# Patient Record
Sex: FEMALE | Race: OTHER | HISPANIC OR LATINO | URBAN - METROPOLITAN AREA
[De-identification: names, ages, dates, MRNs, and addresses within clinical notes are randomized per-mention and may not be internally consistent; named-entity substitution may affect disease eponyms.]

---

## 2017-04-28 ENCOUNTER — EMERGENCY (EMERGENCY)
Facility: HOSPITAL | Age: 52
LOS: 1 days | Discharge: HOME | End: 2017-04-28
Attending: EMERGENCY MEDICINE | Admitting: EMERGENCY MEDICINE
Payer: MEDICARE

## 2017-04-28 VITALS
TEMPERATURE: 98 F | SYSTOLIC BLOOD PRESSURE: 134 MMHG | RESPIRATION RATE: 22 BRPM | HEART RATE: 72 BPM | DIASTOLIC BLOOD PRESSURE: 85 MMHG

## 2017-04-28 LAB
ALBUMIN SERPL ELPH-MCNC: 5.3 G/DL — HIGH (ref 3.3–5)
ALP SERPL-CCNC: 73 U/L — SIGNIFICANT CHANGE UP (ref 40–120)
ALT FLD-CCNC: 17 U/L — SIGNIFICANT CHANGE UP (ref 4–33)
APTT BLD: 29.1 SEC — SIGNIFICANT CHANGE UP (ref 27.5–37.4)
AST SERPL-CCNC: 20 U/L — SIGNIFICANT CHANGE UP (ref 4–32)
BASE EXCESS BLDV CALC-SCNC: 0.1 MMOL/L — SIGNIFICANT CHANGE UP
BASOPHILS # BLD AUTO: 0.04 K/UL — SIGNIFICANT CHANGE UP (ref 0–0.2)
BASOPHILS NFR BLD AUTO: 0.2 % — SIGNIFICANT CHANGE UP (ref 0–2)
BILIRUB SERPL-MCNC: 1.1 MG/DL — SIGNIFICANT CHANGE UP (ref 0.2–1.2)
BLOOD GAS VENOUS - CREATININE: 0.88 MG/DL — SIGNIFICANT CHANGE UP (ref 0.5–1.3)
BUN SERPL-MCNC: 27 MG/DL — HIGH (ref 7–23)
CALCIUM SERPL-MCNC: 10.6 MG/DL — HIGH (ref 8.4–10.5)
CHLORIDE BLDV-SCNC: 104 MMOL/L — SIGNIFICANT CHANGE UP (ref 96–108)
CHLORIDE SERPL-SCNC: 98 MMOL/L — SIGNIFICANT CHANGE UP (ref 98–107)
CO2 SERPL-SCNC: 21 MMOL/L — LOW (ref 22–31)
CREAT SERPL-MCNC: 0.99 MG/DL — SIGNIFICANT CHANGE UP (ref 0.5–1.3)
EOSINOPHIL # BLD AUTO: 0.1 K/UL — SIGNIFICANT CHANGE UP (ref 0–0.5)
EOSINOPHIL NFR BLD AUTO: 0.5 % — SIGNIFICANT CHANGE UP (ref 0–6)
GAS PNL BLDV: 137 MMOL/L — SIGNIFICANT CHANGE UP (ref 136–146)
GLUCOSE BLDV-MCNC: 138 — HIGH (ref 70–99)
GLUCOSE SERPL-MCNC: 135 MG/DL — HIGH (ref 70–99)
HCO3 BLDV-SCNC: 25 MMOL/L — SIGNIFICANT CHANGE UP (ref 20–27)
HCT VFR BLD CALC: 47.7 % — HIGH (ref 34.5–45)
HCT VFR BLDV CALC: 52.2 % — HIGH (ref 34.5–45)
HGB BLD-MCNC: 17 G/DL — HIGH (ref 11.5–15.5)
HGB BLDV-MCNC: 17.1 G/DL — HIGH (ref 11.5–15.5)
IMM GRANULOCYTES NFR BLD AUTO: 0.3 % — SIGNIFICANT CHANGE UP (ref 0–1.5)
INR BLD: 1.15 — SIGNIFICANT CHANGE UP (ref 0.88–1.17)
LACTATE BLDV-MCNC: 2.7 MMOL/L — HIGH (ref 0.5–2)
LYMPHOCYTES # BLD AUTO: 39.1 % — SIGNIFICANT CHANGE UP (ref 13–44)
LYMPHOCYTES # BLD AUTO: 7.71 K/UL — HIGH (ref 1–3.3)
MCHC RBC-ENTMCNC: 32.2 PG — SIGNIFICANT CHANGE UP (ref 27–34)
MCHC RBC-ENTMCNC: 35.6 % — SIGNIFICANT CHANGE UP (ref 32–36)
MCV RBC AUTO: 90.3 FL — SIGNIFICANT CHANGE UP (ref 80–100)
MONOCYTES # BLD AUTO: 1.15 K/UL — HIGH (ref 0–0.9)
MONOCYTES NFR BLD AUTO: 5.8 % — SIGNIFICANT CHANGE UP (ref 2–14)
NEUTROPHILS # BLD AUTO: 10.67 K/UL — HIGH (ref 1.8–7.4)
NEUTROPHILS NFR BLD AUTO: 54.1 % — SIGNIFICANT CHANGE UP (ref 43–77)
PCO2 BLDV: 30 MMHG — LOW (ref 41–51)
PH BLDV: 7.5 PH — HIGH (ref 7.32–7.43)
PLATELET # BLD AUTO: 286 K/UL — SIGNIFICANT CHANGE UP (ref 150–400)
PMV BLD: 11.6 FL — SIGNIFICANT CHANGE UP (ref 7–13)
PO2 BLDV: 51 MMHG — HIGH (ref 35–40)
POTASSIUM BLDV-SCNC: 3.3 MMOL/L — LOW (ref 3.4–4.5)
POTASSIUM SERPL-MCNC: 3.5 MMOL/L — SIGNIFICANT CHANGE UP (ref 3.5–5.3)
POTASSIUM SERPL-SCNC: 3.5 MMOL/L — SIGNIFICANT CHANGE UP (ref 3.5–5.3)
PROT SERPL-MCNC: 9.1 G/DL — HIGH (ref 6–8.3)
PROTHROM AB SERPL-ACNC: 12.9 SEC — SIGNIFICANT CHANGE UP (ref 9.8–13.1)
RBC # BLD: 5.28 M/UL — HIGH (ref 3.8–5.2)
RBC # FLD: 12.4 % — SIGNIFICANT CHANGE UP (ref 10.3–14.5)
SAO2 % BLDV: 88.8 % — HIGH (ref 60–85)
SODIUM SERPL-SCNC: 140 MMOL/L — SIGNIFICANT CHANGE UP (ref 135–145)
WBC # BLD: 19.73 K/UL — HIGH (ref 3.8–10.5)
WBC # FLD AUTO: 19.73 K/UL — HIGH (ref 3.8–10.5)

## 2017-04-28 PROCEDURE — 93010 ELECTROCARDIOGRAM REPORT: CPT

## 2017-04-28 PROCEDURE — 71020: CPT | Mod: 26

## 2017-04-28 PROCEDURE — 99285 EMERGENCY DEPT VISIT HI MDM: CPT | Mod: 25,GC

## 2017-04-28 RX ORDER — IPRATROPIUM/ALBUTEROL SULFATE 18-103MCG
3 AEROSOL WITH ADAPTER (GRAM) INHALATION ONCE
Qty: 0 | Refills: 0 | Status: COMPLETED | OUTPATIENT
Start: 2017-04-28 | End: 2017-04-28

## 2017-04-28 RX ADMIN — Medication 3 MILLILITER(S): at 22:46

## 2017-04-28 RX ADMIN — Medication 50 MILLIGRAM(S): at 22:46

## 2017-04-28 NOTE — ED PROVIDER NOTE - OBJECTIVE STATEMENT
50yo f pmh COPD, HTN, pw cough and SOB. Pt with congestion + productive cough x 4 days. Tactile fevers. Yellow to green sputum. Pt takes no meds or inhalers for COPD. Pt has a pulmonologist but does not recall the name. No chest pain, abdominal pain. Pt with chronic back pain. No recent travel/surgery/immobilization/estrogen use/hx of clots.

## 2017-04-28 NOTE — ED ADULT NURSE NOTE - OBJECTIVE STATEMENT
Jaja RN received pt to spot 18 A&Ox4 reports multiple medical complaints including productive cough with yellow sputum x 3 days, difficulty breathing, N/V, swollen lymph nodes, and "palpitations". Pt answering questions in full sentences, NAD noted, reports hx of heart murmur. IV placed, labs sent, VS as noted, MD at bedside, will reassess.

## 2017-04-28 NOTE — ED PROVIDER NOTE - PROGRESS NOTE DETAILS
pt complained to tech of chest pain while getting EKG, on reeval pt says chest pain for 4 days, pt also endorses stopping opioid pain meds for past few days and experiencing withdrawal symptoms, + diarrhea/ + vomiting. + CP x 4 days. Tani RAMÍREZ: I received sign out from this patient.  EKG done and reviewed with no acute ischemic changes.  Pt initially endorsing to providers only SOB, but now c/o chest pressure to ED tech.  Upon re-evaluation pt now reports that she has been having chest pressure along with the SOB, beginning with exertion, but unrelieved at rest.  The pt also reports abd discomfort, diarrhea, poor appetite, chills, congestion, post-nasal drip, nausea, cough x 4 days.  She reports that she has been on oxycodone for "years" for her chronic back pain, but 4-5 days ago had stopped taking them as she is trying to wean herself off.  No other reported drug or etoh use.  Sxs likely reflective of withdrawal.  VSS, no hypoxia or increased work of breathing.  Mildly poor air entry throughout but no wheezing and pt reports she is feeling better after nebulization.  Will po chall, give zofran and IVFs for symptomatic rx.  Will add cardiac enzymes, given constant chest pain but low likelihood for acs.

## 2017-04-28 NOTE — ED PROVIDER NOTE - ATTENDING CONTRIBUTION TO CARE
[place]  pt with h/o RAD  4 days of cough productive of green/brown phlegm  nasal congestion  SOB  no fever or rigors  SOB 1-2 blocks (baseline walks3-4)  exam  decreased air movement b/l no clear wheeze heard  card S1S2  no gallop abd benign  no LE edema  Imp  story suggestive of bronchitis  will treat with nebulizer  steroid  check CXR  reassess

## 2017-04-28 NOTE — ED PROVIDER NOTE - MEDICAL DECISION MAKING DETAILS
pt with likely copd exac vs bronchitis, will give nebs, steroids, abx, cxr, labs, well appearing, likely d/c pending cxr/peak flows.

## 2017-04-28 NOTE — ED ADULT TRIAGE NOTE - CHIEF COMPLAINT QUOTE
DIFF BREATHING    pt diff breathing 3 days, expectorating green/black phlegm, palish/grey pallor, diff breathing, heart racing, diff swallowing, diff finishing sentences....hx of copd, sleep apnea,

## 2017-04-29 VITALS
OXYGEN SATURATION: 98 % | RESPIRATION RATE: 17 BRPM | TEMPERATURE: 99 F | SYSTOLIC BLOOD PRESSURE: 127 MMHG | HEART RATE: 66 BPM | DIASTOLIC BLOOD PRESSURE: 60 MMHG

## 2017-04-29 LAB
BASE EXCESS BLDV CALC-SCNC: -0.8 MMOL/L — SIGNIFICANT CHANGE UP
BASOPHILS NFR SPEC: 0 % — SIGNIFICANT CHANGE UP (ref 0–2)
BLOOD GAS VENOUS - CREATININE: 0.79 MG/DL — SIGNIFICANT CHANGE UP (ref 0.5–1.3)
CHLORIDE BLDV-SCNC: 107 MMOL/L — SIGNIFICANT CHANGE UP (ref 96–108)
CK MB BLD-MCNC: 1 NG/ML — SIGNIFICANT CHANGE UP (ref 1–4.7)
CK MB BLD-MCNC: SIGNIFICANT CHANGE UP (ref 0–2.5)
CK SERPL-CCNC: 57 U/L — SIGNIFICANT CHANGE UP (ref 25–170)
EOSINOPHIL NFR FLD: 0 % — SIGNIFICANT CHANGE UP (ref 0–6)
GAS PNL BLDV: 138 MMOL/L — SIGNIFICANT CHANGE UP (ref 136–146)
GLUCOSE BLDV-MCNC: 137 — HIGH (ref 70–99)
HCO3 BLDV-SCNC: 24 MMOL/L — SIGNIFICANT CHANGE UP (ref 20–27)
HCT VFR BLDV CALC: 43.9 % — SIGNIFICANT CHANGE UP (ref 34.5–45)
HGB BLDV-MCNC: 14.3 G/DL — SIGNIFICANT CHANGE UP (ref 11.5–15.5)
HYPOCHROMIA BLD QL: SLIGHT — SIGNIFICANT CHANGE UP
LACTATE BLDV-MCNC: 1.3 MMOL/L — SIGNIFICANT CHANGE UP (ref 0.5–2)
LYMPHOCYTES NFR SPEC AUTO: 30.7 % — SIGNIFICANT CHANGE UP (ref 13–44)
MACROCYTES BLD QL: SLIGHT — SIGNIFICANT CHANGE UP
MICROCYTES BLD QL: SLIGHT — SIGNIFICANT CHANGE UP
MONOCYTES NFR BLD: 4.4 % — SIGNIFICANT CHANGE UP (ref 2–9)
NEUTROPHIL AB SER-ACNC: 59.6 % — SIGNIFICANT CHANGE UP (ref 43–77)
NEUTS BAND # BLD: 1.8 % — SIGNIFICANT CHANGE UP (ref 0–6)
PCO2 BLDV: 36 MMHG — LOW (ref 41–51)
PH BLDV: 7.42 PH — SIGNIFICANT CHANGE UP (ref 7.32–7.43)
PLATELET COUNT - ESTIMATE: NORMAL — SIGNIFICANT CHANGE UP
PO2 BLDV: 44 MMHG — HIGH (ref 35–40)
POTASSIUM BLDV-SCNC: 3.3 MMOL/L — LOW (ref 3.4–4.5)
SAO2 % BLDV: 78.8 % — SIGNIFICANT CHANGE UP (ref 60–85)
SPHEROCYTES BLD QL SMEAR: SLIGHT — SIGNIFICANT CHANGE UP
TROPONIN T SERPL-MCNC: < 0.06 NG/ML — SIGNIFICANT CHANGE UP (ref 0–0.06)
VARIANT LYMPHS # BLD: 3.5 % — SIGNIFICANT CHANGE UP

## 2017-04-29 RX ORDER — SODIUM CHLORIDE 9 MG/ML
500 INJECTION INTRAMUSCULAR; INTRAVENOUS; SUBCUTANEOUS ONCE
Qty: 0 | Refills: 0 | Status: COMPLETED | OUTPATIENT
Start: 2017-04-29 | End: 2017-04-29

## 2017-04-29 RX ORDER — SODIUM CHLORIDE 9 MG/ML
1000 INJECTION INTRAMUSCULAR; INTRAVENOUS; SUBCUTANEOUS ONCE
Qty: 0 | Refills: 0 | Status: COMPLETED | OUTPATIENT
Start: 2017-04-29 | End: 2017-04-29

## 2017-04-29 RX ORDER — ALBUTEROL 90 UG/1
1 AEROSOL, METERED ORAL ONCE
Qty: 0 | Refills: 0 | Status: COMPLETED | OUTPATIENT
Start: 2017-04-29 | End: 2017-04-29

## 2017-04-29 RX ORDER — IBUPROFEN 200 MG
600 TABLET ORAL ONCE
Qty: 0 | Refills: 0 | Status: COMPLETED | OUTPATIENT
Start: 2017-04-29 | End: 2017-04-29

## 2017-04-29 RX ORDER — ONDANSETRON 8 MG/1
1 TABLET, FILM COATED ORAL
Qty: 12 | Refills: 0 | OUTPATIENT
Start: 2017-04-29 | End: 2017-05-03

## 2017-04-29 RX ORDER — CIPROFLOXACIN LACTATE 400MG/40ML
1 VIAL (ML) INTRAVENOUS
Qty: 7 | Refills: 0 | OUTPATIENT
Start: 2017-04-29 | End: 2017-05-06

## 2017-04-29 RX ORDER — ONDANSETRON 8 MG/1
4 TABLET, FILM COATED ORAL ONCE
Qty: 0 | Refills: 0 | Status: COMPLETED | OUTPATIENT
Start: 2017-04-29 | End: 2017-04-29

## 2017-04-29 RX ADMIN — SODIUM CHLORIDE 500 MILLILITER(S): 9 INJECTION INTRAMUSCULAR; INTRAVENOUS; SUBCUTANEOUS at 00:57

## 2017-04-29 RX ADMIN — Medication 600 MILLIGRAM(S): at 02:37

## 2017-04-29 RX ADMIN — ALBUTEROL 1 PUFF(S): 90 AEROSOL, METERED ORAL at 04:02

## 2017-04-29 RX ADMIN — ONDANSETRON 4 MILLIGRAM(S): 8 TABLET, FILM COATED ORAL at 00:57

## 2017-04-29 RX ADMIN — SODIUM CHLORIDE 1000 MILLILITER(S): 9 INJECTION INTRAMUSCULAR; INTRAVENOUS; SUBCUTANEOUS at 02:23

## 2017-04-29 RX ADMIN — Medication 600 MILLIGRAM(S): at 03:07

## 2018-08-05 ENCOUNTER — INPATIENT (INPATIENT)
Facility: HOSPITAL | Age: 53
LOS: 7 days | Discharge: HOME | DRG: 167 | End: 2018-08-13
Attending: GENERAL PRACTICE | Admitting: GENERAL PRACTICE
Payer: COMMERCIAL

## 2018-08-05 VITALS
TEMPERATURE: 98 F | RESPIRATION RATE: 18 BRPM | OXYGEN SATURATION: 98 % | SYSTOLIC BLOOD PRESSURE: 147 MMHG | DIASTOLIC BLOOD PRESSURE: 98 MMHG | HEART RATE: 104 BPM

## 2018-08-05 DIAGNOSIS — J18.9 PNEUMONIA, UNSPECIFIED ORGANISM: ICD-10-CM

## 2018-08-05 LAB
ALBUMIN SERPL ELPH-MCNC: 3.3 G/DL — LOW (ref 3.5–5)
ALP SERPL-CCNC: 94 U/L — SIGNIFICANT CHANGE UP (ref 40–120)
ALT FLD-CCNC: 13 U/L DA — SIGNIFICANT CHANGE UP (ref 10–60)
ANION GAP SERPL CALC-SCNC: 9 MMOL/L — SIGNIFICANT CHANGE UP (ref 5–17)
APTT BLD: 31.9 SEC — SIGNIFICANT CHANGE UP (ref 27.5–37.4)
AST SERPL-CCNC: 12 U/L — SIGNIFICANT CHANGE UP (ref 10–40)
BASOPHILS # BLD AUTO: 0.1 K/UL — SIGNIFICANT CHANGE UP (ref 0–0.2)
BASOPHILS NFR BLD AUTO: 0.5 % — SIGNIFICANT CHANGE UP (ref 0–2)
BILIRUB SERPL-MCNC: 0.4 MG/DL — SIGNIFICANT CHANGE UP (ref 0.2–1.2)
BUN SERPL-MCNC: 17 MG/DL — SIGNIFICANT CHANGE UP (ref 7–18)
CALCIUM SERPL-MCNC: 9.6 MG/DL — SIGNIFICANT CHANGE UP (ref 8.4–10.5)
CHLORIDE SERPL-SCNC: 107 MMOL/L — SIGNIFICANT CHANGE UP (ref 96–108)
CK MB BLD-MCNC: <1.6 % — SIGNIFICANT CHANGE UP (ref 0–3.5)
CK MB CFR SERPL CALC: <1 NG/ML — SIGNIFICANT CHANGE UP (ref 0–3.6)
CK SERPL-CCNC: 64 U/L — SIGNIFICANT CHANGE UP (ref 21–215)
CO2 SERPL-SCNC: 25 MMOL/L — SIGNIFICANT CHANGE UP (ref 22–31)
CREAT SERPL-MCNC: 0.97 MG/DL — SIGNIFICANT CHANGE UP (ref 0.5–1.3)
D DIMER BLD IA.RAPID-MCNC: 574 NG/ML DDU — HIGH
EOSINOPHIL # BLD AUTO: 0.1 K/UL — SIGNIFICANT CHANGE UP (ref 0–0.5)
EOSINOPHIL NFR BLD AUTO: 0.4 % — SIGNIFICANT CHANGE UP (ref 0–6)
GLUCOSE SERPL-MCNC: 116 MG/DL — HIGH (ref 70–99)
HCT VFR BLD CALC: 42.3 % — SIGNIFICANT CHANGE UP (ref 34.5–45)
HGB BLD-MCNC: 14 G/DL — SIGNIFICANT CHANGE UP (ref 11.5–15.5)
INR BLD: 1.39 RATIO — HIGH (ref 0.88–1.16)
LACTATE SERPL-SCNC: 1.1 MMOL/L — SIGNIFICANT CHANGE UP (ref 0.7–2)
LYMPHOCYTES # BLD AUTO: 19.7 % — SIGNIFICANT CHANGE UP (ref 13–44)
LYMPHOCYTES # BLD AUTO: 3.3 K/UL — SIGNIFICANT CHANGE UP (ref 1–3.3)
MCHC RBC-ENTMCNC: 30.4 PG — SIGNIFICANT CHANGE UP (ref 27–34)
MCHC RBC-ENTMCNC: 33 GM/DL — SIGNIFICANT CHANGE UP (ref 32–36)
MCV RBC AUTO: 92.2 FL — SIGNIFICANT CHANGE UP (ref 80–100)
MONOCYTES # BLD AUTO: 0.7 K/UL — SIGNIFICANT CHANGE UP (ref 0–0.9)
MONOCYTES NFR BLD AUTO: 4 % — SIGNIFICANT CHANGE UP (ref 2–14)
NEUTROPHILS # BLD AUTO: 12.8 K/UL — HIGH (ref 1.8–7.4)
NEUTROPHILS NFR BLD AUTO: 75.4 % — SIGNIFICANT CHANGE UP (ref 43–77)
NT-PROBNP SERPL-SCNC: 191 PG/ML — HIGH (ref 0–125)
PLATELET # BLD AUTO: 350 K/UL — SIGNIFICANT CHANGE UP (ref 150–400)
POTASSIUM SERPL-MCNC: 3.9 MMOL/L — SIGNIFICANT CHANGE UP (ref 3.5–5.3)
POTASSIUM SERPL-SCNC: 3.9 MMOL/L — SIGNIFICANT CHANGE UP (ref 3.5–5.3)
PROT SERPL-MCNC: 9.3 G/DL — HIGH (ref 6–8.3)
PROTHROM AB SERPL-ACNC: 15.3 SEC — HIGH (ref 9.8–12.7)
RAPID RVP RESULT: SIGNIFICANT CHANGE UP
RBC # BLD: 4.59 M/UL — SIGNIFICANT CHANGE UP (ref 3.8–5.2)
RBC # FLD: 11.4 % — SIGNIFICANT CHANGE UP (ref 10.3–14.5)
SODIUM SERPL-SCNC: 141 MMOL/L — SIGNIFICANT CHANGE UP (ref 135–145)
TROPONIN I SERPL-MCNC: <0.015 NG/ML — SIGNIFICANT CHANGE UP (ref 0–0.04)
WBC # BLD: 17 K/UL — HIGH (ref 3.8–10.5)
WBC # FLD AUTO: 17 K/UL — HIGH (ref 3.8–10.5)

## 2018-08-05 PROCEDURE — 99285 EMERGENCY DEPT VISIT HI MDM: CPT

## 2018-08-05 PROCEDURE — 71045 X-RAY EXAM CHEST 1 VIEW: CPT | Mod: 26

## 2018-08-05 RX ORDER — KETOROLAC TROMETHAMINE 30 MG/ML
30 SYRINGE (ML) INJECTION ONCE
Qty: 0 | Refills: 0 | Status: DISCONTINUED | OUTPATIENT
Start: 2018-08-05 | End: 2018-08-05

## 2018-08-05 RX ORDER — IPRATROPIUM/ALBUTEROL SULFATE 18-103MCG
3 AEROSOL WITH ADAPTER (GRAM) INHALATION
Qty: 0 | Refills: 0 | Status: COMPLETED | OUTPATIENT
Start: 2018-08-05 | End: 2018-08-05

## 2018-08-05 RX ORDER — PIPERACILLIN AND TAZOBACTAM 4; .5 G/20ML; G/20ML
3.38 INJECTION, POWDER, LYOPHILIZED, FOR SOLUTION INTRAVENOUS ONCE
Qty: 0 | Refills: 0 | Status: COMPLETED | OUTPATIENT
Start: 2018-08-05 | End: 2018-08-05

## 2018-08-05 RX ORDER — ACETAMINOPHEN 500 MG
975 TABLET ORAL ONCE
Qty: 0 | Refills: 0 | Status: COMPLETED | OUTPATIENT
Start: 2018-08-05 | End: 2018-08-05

## 2018-08-05 RX ADMIN — Medication 30 MILLIGRAM(S): at 18:33

## 2018-08-05 RX ADMIN — Medication 30 MILLIGRAM(S): at 19:35

## 2018-08-05 RX ADMIN — Medication 975 MILLIGRAM(S): at 20:19

## 2018-08-05 RX ADMIN — Medication 3 MILLILITER(S): at 18:34

## 2018-08-05 RX ADMIN — PIPERACILLIN AND TAZOBACTAM 200 GRAM(S): 4; .5 INJECTION, POWDER, LYOPHILIZED, FOR SOLUTION INTRAVENOUS at 18:33

## 2018-08-05 RX ADMIN — Medication 125 MILLIGRAM(S): at 18:33
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
## 2018-08-05 NOTE — ED ADULT NURSE NOTE - NSIMPLEMENTINTERV_GEN_ALL_ED
Implemented All Universal Safety Interventions:  [place] to call system. Call bell, personal items and telephone within reach. Instruct patient to call for assistance. Room bathroom lighting operational. Non-slip footwear when patient is off stretcher. Physically safe environment: no spills, clutter or unnecessary equipment. Stretcher in lowest position, wheels locked, appropriate side rails in place.

## 2018-08-05 NOTE — ED PROVIDER NOTE - MUSCULOSKELETAL, MLM
Spine appears normal, range of motion is not limited, Rt subaxilla-tenderness to palp., no deformity

## 2018-08-05 NOTE — ED PROVIDER NOTE - CARE PLAN
Principal Discharge DX:	PNA (pneumonia)  Secondary Diagnosis:	Asthma exacerbation  Secondary Diagnosis:	Cavitating mass of lower lobe of left lung

## 2018-08-06 DIAGNOSIS — J18.9 PNEUMONIA, UNSPECIFIED ORGANISM: ICD-10-CM

## 2018-08-06 DIAGNOSIS — D64.9 ANEMIA, UNSPECIFIED: ICD-10-CM

## 2018-08-06 DIAGNOSIS — Z29.9 ENCOUNTER FOR PROPHYLACTIC MEASURES, UNSPECIFIED: ICD-10-CM

## 2018-08-06 DIAGNOSIS — J98.4 OTHER DISORDERS OF LUNG: ICD-10-CM

## 2018-08-06 DIAGNOSIS — I10 ESSENTIAL (PRIMARY) HYPERTENSION: ICD-10-CM

## 2018-08-06 DIAGNOSIS — J44.9 CHRONIC OBSTRUCTIVE PULMONARY DISEASE, UNSPECIFIED: ICD-10-CM

## 2018-08-06 LAB
24R-OH-CALCIDIOL SERPL-MCNC: 27.6 NG/ML — LOW (ref 30–80)
ANION GAP SERPL CALC-SCNC: 11 MMOL/L — SIGNIFICANT CHANGE UP (ref 5–17)
BASOPHILS # BLD AUTO: 0 K/UL — SIGNIFICANT CHANGE UP (ref 0–0.2)
BASOPHILS NFR BLD AUTO: 0.3 % — SIGNIFICANT CHANGE UP (ref 0–2)
BUN SERPL-MCNC: 15 MG/DL — SIGNIFICANT CHANGE UP (ref 7–18)
CALCIUM SERPL-MCNC: 9.1 MG/DL — SIGNIFICANT CHANGE UP (ref 8.4–10.5)
CHLORIDE SERPL-SCNC: 110 MMOL/L — HIGH (ref 96–108)
CHOLEST SERPL-MCNC: 110 MG/DL — SIGNIFICANT CHANGE UP (ref 10–199)
CO2 SERPL-SCNC: 20 MMOL/L — LOW (ref 22–31)
CREAT SERPL-MCNC: 0.9 MG/DL — SIGNIFICANT CHANGE UP (ref 0.5–1.3)
EOSINOPHIL # BLD AUTO: 0 K/UL — SIGNIFICANT CHANGE UP (ref 0–0.5)
EOSINOPHIL NFR BLD AUTO: 0 % — SIGNIFICANT CHANGE UP (ref 0–6)
ERYTHROCYTE [SEDIMENTATION RATE] IN BLOOD: 88 MM/HR — HIGH (ref 0–20)
GLUCOSE SERPL-MCNC: 188 MG/DL — HIGH (ref 70–99)
HBA1C BLD-MCNC: 6.1 % — HIGH (ref 4–5.6)
HCT VFR BLD CALC: 37.7 % — SIGNIFICANT CHANGE UP (ref 34.5–45)
HDLC SERPL-MCNC: 29 MG/DL — LOW (ref 40–125)
HGB BLD-MCNC: 12.6 G/DL — SIGNIFICANT CHANGE UP (ref 11.5–15.5)
HIV 1+2 AB+HIV1 P24 AG SERPL QL IA: SIGNIFICANT CHANGE UP
LIDOCAIN IGE QN: 91 U/L — SIGNIFICANT CHANGE UP (ref 73–393)
LIPID PNL WITH DIRECT LDL SERPL: 58 MG/DL — SIGNIFICANT CHANGE UP
LYMPHOCYTES # BLD AUTO: 1.9 K/UL — SIGNIFICANT CHANGE UP (ref 1–3.3)
LYMPHOCYTES # BLD AUTO: 12.6 % — LOW (ref 13–44)
MCHC RBC-ENTMCNC: 30.6 PG — SIGNIFICANT CHANGE UP (ref 27–34)
MCHC RBC-ENTMCNC: 33.3 GM/DL — SIGNIFICANT CHANGE UP (ref 32–36)
MCV RBC AUTO: 91.9 FL — SIGNIFICANT CHANGE UP (ref 80–100)
MONOCYTES # BLD AUTO: 0.2 K/UL — SIGNIFICANT CHANGE UP (ref 0–0.9)
MONOCYTES NFR BLD AUTO: 1.6 % — LOW (ref 2–14)
NEUTROPHILS # BLD AUTO: 12.6 K/UL — HIGH (ref 1.8–7.4)
NEUTROPHILS NFR BLD AUTO: 85.5 % — HIGH (ref 43–77)
PHOSPHATE SERPL-MCNC: 4.1 MG/DL — SIGNIFICANT CHANGE UP (ref 2.5–4.5)
PLATELET # BLD AUTO: 308 K/UL — SIGNIFICANT CHANGE UP (ref 150–400)
POTASSIUM SERPL-MCNC: 3.4 MMOL/L — LOW (ref 3.5–5.3)
POTASSIUM SERPL-SCNC: 3.4 MMOL/L — LOW (ref 3.5–5.3)
RBC # BLD: 4.11 M/UL — SIGNIFICANT CHANGE UP (ref 3.8–5.2)
RBC # FLD: 11.3 % — SIGNIFICANT CHANGE UP (ref 10.3–14.5)
SODIUM SERPL-SCNC: 141 MMOL/L — SIGNIFICANT CHANGE UP (ref 135–145)
TOTAL CHOLESTEROL/HDL RATIO MEASUREMENT: 3.8 RATIO — SIGNIFICANT CHANGE UP (ref 3.3–7.1)
TRIGL SERPL-MCNC: 116 MG/DL — SIGNIFICANT CHANGE UP (ref 10–149)
TSH SERPL-MCNC: 0.13 UU/ML — LOW (ref 0.34–4.82)
VIT B12 SERPL-MCNC: 1168 PG/ML — SIGNIFICANT CHANGE UP (ref 232–1245)
WBC # BLD: 14.7 K/UL — HIGH (ref 3.8–10.5)
WBC # FLD AUTO: 14.7 K/UL — HIGH (ref 3.8–10.5)

## 2018-08-06 PROCEDURE — 71275 CT ANGIOGRAPHY CHEST: CPT | Mod: 26

## 2018-08-06 RX ORDER — CEFTRIAXONE 500 MG/1
1 INJECTION, POWDER, FOR SOLUTION INTRAMUSCULAR; INTRAVENOUS EVERY 24 HOURS
Qty: 0 | Refills: 0 | Status: DISCONTINUED | OUTPATIENT
Start: 2018-08-07 | End: 2018-08-08

## 2018-08-06 RX ORDER — POTASSIUM CHLORIDE 20 MEQ
20 PACKET (EA) ORAL
Qty: 0 | Refills: 0 | Status: COMPLETED | OUTPATIENT
Start: 2018-08-06 | End: 2018-08-06

## 2018-08-06 RX ORDER — CEFTRIAXONE 500 MG/1
INJECTION, POWDER, FOR SOLUTION INTRAMUSCULAR; INTRAVENOUS
Qty: 0 | Refills: 0 | Status: DISCONTINUED | OUTPATIENT
Start: 2018-08-06 | End: 2018-08-08

## 2018-08-06 RX ORDER — AZITHROMYCIN 500 MG/1
TABLET, FILM COATED ORAL
Qty: 0 | Refills: 0 | Status: DISCONTINUED | OUTPATIENT
Start: 2018-08-06 | End: 2018-08-13

## 2018-08-06 RX ORDER — CEFTRIAXONE 500 MG/1
1 INJECTION, POWDER, FOR SOLUTION INTRAMUSCULAR; INTRAVENOUS ONCE
Qty: 0 | Refills: 0 | Status: COMPLETED | OUTPATIENT
Start: 2018-08-06 | End: 2018-08-06

## 2018-08-06 RX ORDER — OXYCODONE AND ACETAMINOPHEN 5; 325 MG/1; MG/1
1 TABLET ORAL ONCE
Qty: 0 | Refills: 0 | Status: DISCONTINUED | OUTPATIENT
Start: 2018-08-06 | End: 2018-08-06

## 2018-08-06 RX ORDER — NICOTINE POLACRILEX 2 MG
1 GUM BUCCAL DAILY
Qty: 0 | Refills: 0 | Status: DISCONTINUED | OUTPATIENT
Start: 2018-08-06 | End: 2018-08-13

## 2018-08-06 RX ORDER — ALBUTEROL 90 UG/1
1 AEROSOL, METERED ORAL EVERY 4 HOURS
Qty: 0 | Refills: 0 | Status: DISCONTINUED | OUTPATIENT
Start: 2018-08-06 | End: 2018-08-13

## 2018-08-06 RX ORDER — ACETAMINOPHEN 500 MG
650 TABLET ORAL EVERY 6 HOURS
Qty: 0 | Refills: 0 | Status: DISCONTINUED | OUTPATIENT
Start: 2018-08-06 | End: 2018-08-13

## 2018-08-06 RX ORDER — TIOTROPIUM BROMIDE 18 UG/1
1 CAPSULE ORAL; RESPIRATORY (INHALATION) DAILY
Qty: 0 | Refills: 0 | Status: DISCONTINUED | OUTPATIENT
Start: 2018-08-06 | End: 2018-08-13

## 2018-08-06 RX ORDER — IPRATROPIUM/ALBUTEROL SULFATE 18-103MCG
3 AEROSOL WITH ADAPTER (GRAM) INHALATION EVERY 6 HOURS
Qty: 0 | Refills: 0 | Status: DISCONTINUED | OUTPATIENT
Start: 2018-08-06 | End: 2018-08-13

## 2018-08-06 RX ORDER — OXYCODONE AND ACETAMINOPHEN 5; 325 MG/1; MG/1
1 TABLET ORAL EVERY 6 HOURS
Qty: 0 | Refills: 0 | Status: DISCONTINUED | OUTPATIENT
Start: 2018-08-06 | End: 2018-08-07

## 2018-08-06 RX ORDER — AZITHROMYCIN 500 MG/1
500 TABLET, FILM COATED ORAL EVERY 24 HOURS
Qty: 0 | Refills: 0 | Status: DISCONTINUED | OUTPATIENT
Start: 2018-08-07 | End: 2018-08-13

## 2018-08-06 RX ORDER — AZITHROMYCIN 500 MG/1
500 TABLET, FILM COATED ORAL ONCE
Qty: 0 | Refills: 0 | Status: COMPLETED | OUTPATIENT
Start: 2018-08-06 | End: 2018-08-06

## 2018-08-06 RX ADMIN — Medication 100 MILLIGRAM(S): at 08:22

## 2018-08-06 RX ADMIN — Medication 3 MILLILITER(S): at 16:57

## 2018-08-06 RX ADMIN — Medication 20 MILLIEQUIVALENT(S): at 13:29

## 2018-08-06 RX ADMIN — Medication 650 MILLIGRAM(S): at 21:14

## 2018-08-06 RX ADMIN — OXYCODONE AND ACETAMINOPHEN 1 TABLET(S): 5; 325 TABLET ORAL at 15:04

## 2018-08-06 RX ADMIN — OXYCODONE AND ACETAMINOPHEN 1 TABLET(S): 5; 325 TABLET ORAL at 16:41

## 2018-08-06 RX ADMIN — OXYCODONE AND ACETAMINOPHEN 1 TABLET(S): 5; 325 TABLET ORAL at 23:56

## 2018-08-06 RX ADMIN — Medication 3 MILLILITER(S): at 08:22

## 2018-08-06 RX ADMIN — OXYCODONE AND ACETAMINOPHEN 1 TABLET(S): 5; 325 TABLET ORAL at 18:41

## 2018-08-06 RX ADMIN — Medication 20 MILLIEQUIVALENT(S): at 15:01

## 2018-08-06 RX ADMIN — Medication 100 MILLIGRAM(S): at 21:14

## 2018-08-06 RX ADMIN — Medication 20 MILLIEQUIVALENT(S): at 13:30

## 2018-08-06 RX ADMIN — CEFTRIAXONE 100 GRAM(S): 500 INJECTION, POWDER, FOR SOLUTION INTRAMUSCULAR; INTRAVENOUS at 08:22

## 2018-08-06 RX ADMIN — Medication 650 MILLIGRAM(S): at 01:13

## 2018-08-06 RX ADMIN — OXYCODONE AND ACETAMINOPHEN 1 TABLET(S): 5; 325 TABLET ORAL at 17:50

## 2018-08-06 RX ADMIN — AZITHROMYCIN 250 MILLIGRAM(S): 500 TABLET, FILM COATED ORAL at 06:09

## 2018-08-06 RX ADMIN — Medication 100 MILLIGRAM(S): at 01:13

## 2018-08-06 RX ADMIN — Medication 3 MILLILITER(S): at 06:08

## 2018-08-06 RX ADMIN — Medication 100 MILLIGRAM(S): at 13:28

## 2018-08-06 RX ADMIN — Medication 3 MILLILITER(S): at 21:15

## 2018-08-06 RX ADMIN — Medication 1 PATCH: at 13:28

## 2018-08-06 NOTE — H&P ADULT - PROBLEM SELECTOR PLAN 1
Patient p/w cough, fever and weakness for 1 week  CXR: left lung infiltrate w/ cavitation; RVP negative  Follow up CT chest for further evaluation  Follow up BCx Patient p/w cough, fever and weakness for 1 week  CXR: left lung infiltrate w/ cavitation; RVP negative  Follow up CT chest for further evaluation  Follow up BCx, Sputum Culture  c/w Rocephin + Zithromax  c/w Tessalon pearls + DUONEB's q6hrs Patient p/w cough, fever and weakness for 1 week  CXR: left lung infiltrate w/ cavitation; RVP negative  Follow up CT chest for further evaluation  Follow up BCx, Sputum Culture  c/w Rocephin + Zithromax  c/w Tessalon pearls + DUONEB's q6hrs  ID consult Patient p/w cough, fever and weakness for 1 week  On admission: Leukocytosis, afebrile, lactate wnL, not septic  CXR: left lung infiltrate w/ cavitation; RVP negative  Follow up CT chest for further evaluation  Follow up BCx, Sputum Culture, Aspergillus serology  c/w Rocephin + Zithromax for now  c/w Tessalon pearls + DUONEB's q6hrs  ID consult: Dr Lloyd

## 2018-08-06 NOTE — H&P ADULT - PROBLEM SELECTOR PLAN 4
IMPROVE VTE Individual Risk Assessment          RISK                                                          Points  [  ] Previous VTE                                                3  [  ] Thrombophilia                                             2  [  ] Lower limb paralysis                                   2        (unable to hold up >15 seconds)    [  ] Current Cancer                                             2         (within 6 months)  [ x ] Immobilization > 24 hrs                              1  [  ] ICU/CCU stay > 24 hours                             1  [  ] Age > 60                                                         1    IMPROVE VTE Score: 1  No need for DVT chemoppx

## 2018-08-06 NOTE — CONSULT NOTE ADULT - PROBLEM SELECTOR RECOMMENDATION 9
Most likely secondary to pneumonia howeve malignancy is a possibility in view of hx of smoking  May need Bronchoscopy
1- Continue Rocephin.  2- Continue Azithromycin.  3- O2 as needed.  4- Pulmonary management.  5- F/u CXR.

## 2018-08-06 NOTE — H&P ADULT - ATTENDING COMMENTS
Patient seen, examined, and interviewed by me, case discussed with me, chart reviewed, agree with above H/P as reviewed and edited by me.  See  full note above.  no PE  Pulm and ID appreciated  percocet added for chronic back pain ( previously took)  will follow sputum AFB..

## 2018-08-06 NOTE — CONSULT NOTE ADULT - PROBLEM SELECTOR RECOMMENDATION 4
Bronchodilators  symbicort 160/4.5 mcgs 2 puff po BID  spiriva  Pfts as OP
1- Monitor Blood pressure closely.  2- Blood pressure control.  3- BP. meds as per cardiology and primary care team.

## 2018-08-06 NOTE — H&P ADULT - ASSESSMENT
51yo female, from home, c/o productive whitish cough, weakness and vomiting since past Saturday. 51yo female, from home, c/o productive whitish cough, weakness and vomiting since past Saturday.    Patient being admitted for pNA with cavitary lesion, concerns for TB vs Fungal infx

## 2018-08-06 NOTE — CONSULT NOTE ADULT - SUBJECTIVE AND OBJECTIVE BOX
PULMONARY CONSULT NOTE      JOSE MAYER  MRN-[id number]    Patient is a 52y old  Female who presents with a chief complaint of SOB (06 Aug 2018 00:16)      History of Present Illness:  Reason for Admission: SOB	  History of Present Illness: 	  53yo female, from home, c/o productive whitish cough, weakness and vomiting since past Saturday. Patient refers being unable to tolerate any oral intake. Patient also endorsing wheezing, subjective fever.. Denies any recent travel, most recent travel was 2 yrs ago to the [place]. Denies night sweats, weight loss, hemoptysis, hx TB or fungal infection, sick contacts.       HISTORY OF PRESENT ILLNESS: As above. Awake, alert, comfortable in bed in NAD. Complaining of pain in neck and upper back. Has hx of smoking and has been told to have Copd in the past.    MEDICATIONS  (STANDING):  ALBUTerol    90 MICROgram(s) HFA Inhaler 1 Puff(s) Inhalation every 4 hours  ALBUTerol/ipratropium for Nebulization 3 milliLiter(s) Nebulizer every 6 hours  azithromycin  IVPB      benzonatate 100 milliGRAM(s) Oral three times a day  cefTRIAXone   IVPB      nicotine - 21 mG/24Hr(s) Patch 1 patch Transdermal daily  potassium chloride    Tablet ER 20 milliEquivalent(s) Oral every 2 hours  tiotropium 18 MICROgram(s) Capsule 1 Capsule(s) Inhalation daily      MEDICATIONS  (PRN):  acetaminophen   Tablet 650 milliGRAM(s) Oral every 6 hours PRN For Temp greater than 38 C (100.4 F)      Allergies    No Known Allergies    Intolerances        PAST MEDICAL & SURGICAL HISTORY:  Anemia  HTN (hypertension)  Asthma  Depression  Chronic low back pain  No significant past surgical history      FAMILY HISTORY:  No pertinent family history in first degree relatives      SOCIAL HISTORY  Smoking History:     REVIEW OF SYSTEMS:    CONSTITUTIONAL:  No fevers, chills, sweats    HEENT:  Eyes:  No diplopia or blurred vision. ENT:  No earache, sore throat or runny nose.    CARDIOVASCULAR:  No pressure, squeezing, tightness, or heaviness about the chest; no palpitations.    RESPIRATORY:  Per HPI    GASTROINTESTINAL:  No abdominal pain, nausea, vomiting or diarrhea.    GENITOURINARY:  No dysuria, frequency or urgency.    NEUROLOGIC:  No paresthesias, fasciculations, seizures or weakness.    PSYCHIATRIC:  No disorder of thought or mood.    Vital Signs Last 24 Hrs  T(C): 36.7 (06 Aug 2018 06:05), Max: 36.9 (05 Aug 2018 23:39)  T(F): 98 (06 Aug 2018 06:05), Max: 98.4 (05 Aug 2018 23:39)  HR: 67 (06 Aug 2018 06:05) (67 - 104)  BP: 121/80 (06 Aug 2018 06:05) (121/80 - 147/98)  BP(mean): --  RR: 18 (06 Aug 2018 06:05) (16 - 18)  SpO2: 99% (06 Aug 2018 06:05) (98% - 99%)  I&O's Detail      PHYSICAL EXAMINATION:    GENERAL: The patient is a well-developed, well-nourished _____in no apparent distress.     HEENT: Head is normocephalic and atraumatic. Extraocular muscles are intact. Mucous membranes are moist.     NECK: Supple.     LUNGS: Rales on left upper back    HEART: Regular rate and rhythm without murmur.    ABDOMEN: Soft, nontender, and nondistended.  No hepatosplenomegaly is noted.    EXTREMITIES: Without any cyanosis, clubbing, rash, lesions or edema.    NEUROLOGIC: Grossly intact.      LABS:                        12.6   14.7  )-----------( 308      ( 06 Aug 2018 07:18 )             37.7     08-06    141  |  110<H>  |  15  ----------------------------<  188<H>  3.4<L>   |  20<L>  |  0.90    Ca    9.1      06 Aug 2018 07:18  Phos  4.1     08-06    TPro  9.3<H>  /  Alb  3.3<L>  /  TBili  0.4  /  DBili  x   /  AST  12  /  ALT  13  /  AlkPhos  94  08-05    PT/INR - ( 05 Aug 2018 16:33 )   PT: 15.3 sec;   INR: 1.39 ratio         PTT - ( 05 Aug 2018 16:33 )  PTT:31.9 sec      CARDIAC MARKERS ( 05 Aug 2018 16:33 )  <0.015 ng/mL / x     / 64 U/L / x     / <1.0 ng/mL      D-Dimer Assay, Quantitative: 574 ng/mL DDU (08-05-18 @ 16:33)    Serum Pro-Brain Natriuretic Peptide: 191 pg/mL (08-05-18 @ 16:33)    Lactate, Blood: 1.1 mmol/L (08-05-18 @ 18:32)        MICROBIOLOGY:    RADIOLOGY & ADDITIONAL STUDIES:    CXR:    Ct scan chest:  < from: CT Angio Chest w/ IV Cont (08.06.18 @ 00:24) >    IMPRESSION:  No pulmonary embolism.    Left upper lobe cavitary mass versus cavitary pneumonia. Small   mediastinal and left hilar lymph nodes which could be reactive from   infection or metastatic if malignancy.      < end of copied text >  < from: CT Angio Chest w/ IV Cont (08.06.18 @ 00:24) >    IMPRESSION:  No pulmonary embolism.    Left upper lobe cavitary mass versus cavitary pneumonia. Small   mediastinal and left hilar lymph nodes which could be reactive from   infection or metastatic if malignancy.      < end of copied text >    ekg;    echo:

## 2018-08-06 NOTE — CONSULT NOTE ADULT - SUBJECTIVE AND OBJECTIVE BOX
HPI:  53yo female, from home, c/o productive whitish cough, weakness and vomiting since past Saturday. Patient refers being unable to tolerate any oral intake. Patient also endorsing wheezing, subjective fever.. Denies any recent travel, most recent travel was 2 yrs ago to the [place]. Denies night sweats, weight loss, hemoptysis, hx TB or fungal infection, sick contacts. (06 Aug 2018 00:16)      PAST MEDICAL & SURGICAL HISTORY:  Anemia  HTN (hypertension)  Asthma  Depression  Chronic low back pain  No significant past surgical history      No Known Allergies      acetaminophen   Tablet 650 milliGRAM(s) Oral every 6 hours PRN  ALBUTerol    90 MICROgram(s) HFA Inhaler 1 Puff(s) Inhalation every 4 hours  ALBUTerol/ipratropium for Nebulization 3 milliLiter(s) Nebulizer every 6 hours  azithromycin  IVPB      benzonatate 100 milliGRAM(s) Oral three times a day  cefTRIAXone   IVPB      nicotine - 21 mG/24Hr(s) Patch 1 patch Transdermal daily  tiotropium 18 MICROgram(s) Capsule 1 Capsule(s) Inhalation daily      Social Hx:    FAMILY HISTORY:  No pertinent family history in first degree relatives        ROS  [  ] UNABLE TO ELICIT    General:  [  ] None  [ x ] Fever  [ x ] Chills  [ x ] Malaise    Skin:  [ x ] None [  ] Rash  [  ] Wound  [  ] Ulcer    HEENT:  [ x ] None  [  ] Sore Throat  [  ] Nasal congestion/ runny nose  [  ] Photophobia [  ] Neck pain      Chest:  [  ] None   [  ] SOB  [ x ] Cough  [  ] None    Cardiovascular:   [ x ] None  [  ] CP  [  ] Palpitation    Gastrointestinal:  [  ] None  [  ] Abd pain   [ x ] Nausea    [ x ] Vomiting   [  ] Diarrhea	     Genitourinary:  [ x ] None [  ] Polyuria   [  ] Urgency  [  ] Frequency  [  ] Dysuria    [  ]  Hematuria       Musculoskeletal:  [  ] None [  ] Back Pain	[  ] Body aches  [  ] Joint pain  [ x ] Weakness    Neurological: [  ] None [  ]Dizziness  [  ]Visual Disturbance  [  ]Headaches   [ x ] Weakness      PHYSICAL EXAM:    Vital Signs Last 24 Hrs  T(C): 36.7 (06 Aug 2018 06:05), Max: 36.9 (05 Aug 2018 23:39)  T(F): 98 (06 Aug 2018 06:05), Max: 98.4 (05 Aug 2018 23:39)  HR: 67 (06 Aug 2018 06:05) (67 - 104)  BP: 121/80 (06 Aug 2018 06:05) (121/80 - 147/98)  BP(mean): --  RR: 18 (06 Aug 2018 06:05) (16 - 18)  SpO2: 99% (06 Aug 2018 06:05) (98% - 99%)    Constitutional:    HEENT: [ x ] Wnl  [  ] Pharyngeal congestion    Neck:  [ x ] Supple  [  ]Lymphadenopathy  [ x ] No JVD   [  ] JVD  [  ] Masses   [  ] WNL    CHEST/Respiratory:  [  ]Clear to auscultation  [  ] Rales   [  ] Rhonchi   [ x ] Wheezing     [  ] Chest Tenderness      Cardiovascular:  [ x ] Reg S1 S2   [  ] Irreg S1 S2   [ x ]No Murmur  [  ] +ve Murmurs  [  ]Systolic [  ]Diastolic      Abdomen:  [ x ] Soft  [ x ] No tendrerness  [  ] Tenderness  [  ] Organomegaly  [  ] ABD Distention  [  ] Rigidity                       [ x ] No Regidity                       [ x ] No Rebound Tenderness  [  ] No Guarding Rigidity  [  ] Rebound Tenderness[  ] Guarding Rigidity                          [ x ]  +ve Bowel Sounds  [  ] Decreased Bowel Sounds    [  ] Absent Bowel Sounds                            Extremities: [ x ] No edema [  ] Edema  [  ] Clubbing   [  ] Cyanosis                         [ x ] No Tender Calf muscles  [  ] Tender Calf muscles                        [ x ] Palpable peripheral pulses    Neurological: [ x ] Awake  [ x ] Alert  [ x ] Oriented  x  3                           [  ] Confused  [  ] Drowsy  [  ] respond to painful stimuli  [  ] Unresponsive    Skin:  [ x ] Intact [  ] Redness [  ] Thrombophlebitis  [  ] Rashes  [  ] Dry  [  ] Ulcers    Ortho:  [  ] Joint Swelling  [  ] Joint erythema [  ] Joint tenderness                [  ] Increased temp. to touch  [  ] DJD [ x ] WNL      LABS/DIAGNOSTIC TESTS                          12.6   14.7  )-----------( 308      ( 06 Aug 2018 07:18 )             37.7     WBC Count: 14.7 K/uL (08-06 @ 07:18)  WBC Count: 17.0 K/uL (08-05 @ 16:33)      08-06    141  |  110<H>  |  15  ----------------------------<  188<H>  3.4<L>   |  20<L>  |  0.90    Ca    9.1      06 Aug 2018 07:18  Phos  4.1     08-06    TPro  9.3<H>  /  Alb  3.3<L>  /  TBili  0.4  /  DBili  x   /  AST  12  /  ALT  13  /  AlkPhos  94  08-05          LIVER FUNCTIONS - ( 05 Aug 2018 16:33 )  Alb: 3.3 g/dL / Pro: 9.3 g/dL / ALK PHOS: 94 U/L / ALT: 13 U/L DA / AST: 12 U/L / GGT: x             PT/INR - ( 05 Aug 2018 16:33 )   PT: 15.3 sec;   INR: 1.39 ratio         PTT - ( 05 Aug 2018 16:33 )  PTT:31.9 sec    LACTATE:Lactate, Blood: 1.1 mmol/L (08-05 @ 18:32)        CULTURES:   None yet.    RADIOLOGY      EXAM:  CT ANGIO CHEST (W)AW IC                            PROCEDURE DATE:  08/06/2018          INTERPRETATION:  HISTORY: Shortness of breath and elevated d-dimer.?   Cavitary mass on x-ray.    TECHNIQUE:  CT pulmonary angiography was performed of the chest according   to standard institutional protocol. Coronal, sagittal and transaxial 3-D   MIP reformatted images are provided from the transaxial source data.     68mL of Omnipaque 350 was administered without complication and 32 mL was   discarded.      COMPARISON: No similar prior study available for comparison.    FINDINGS:   There is good opacification of pulmonary arterial tree. No filling defect   is present to suggest acute pulmonary embolus.    The thyroid gland is unremarkable.     Evaluation of the lung parenchyma demonstrates a 8.3 x 5.3 x 5.4 cm   region of consolidation in the left upper lobe with associated   cavitation. There is an air-fluid level in the largest cavitary region.   There are adjacent nodular opacities to the inferior aspect of the   consolidation within the lingula of the left upper lobe. There is no   crossing of the procedure. The right lung is clear. There is no pleural   effusion or pneumothorax. The trachea and main bronchi are clear.    There are small nonspecific mediastinal and left hilar lymph nodes. The   largest mediastinal lymph node is a left precarinal node measuring 1.7   cm. Hilar lymph nodes measure up to 1.4 cm. There is no significant   supraclavicular, axillary or right hilar adenopathy.    The heart size is normal. There is no pericardial effusion. The thoracic   aorta is normal in caliber without dissection.     Limited images through the upper abdomen demonstrate no acute abnormality.    The visualized osseous structures are within normal limits.     IMPRESSION:  No pulmonary embolism.    Left upper lobe cavitary mass versus cavitary pneumonia. Small   mediastinal and left hilar lymph nodes which could be reactive from   infection or metastatic if malignancy.      EXAM:  XR CHEST PORTABLE IMMED 1V                            PROCEDURE DATE:  08/05/2018          INTERPRETATION:  Chest one view    HISTORY: Shortness of breath    COMPARISON STUDY: 4/28/2017    Frontal expiratory view of the chest shows the heartto be normal in   size. The lungs show left lower lobe superior segment infiltrate with   questionable cavitation within it and there is no evidence of   pneumothorax nor pleural effusion.    IMPRESSION:  Left lung infiltrate possibly with cavitation. HPI:  53yo female, from home, c/o productive whitish cough, weakness and vomiting since past Saturday. Patient refers being unable to tolerate any oral intake. Patient also endorsing wheezing, subjective fever.. Denies any recent travel, most recent travel was 2 yrs ago to [place]. Denies night sweats, weight loss, hemoptysis, hx TB or fungal infection, sick contacts. (06 Aug 2018 00:16)      PAST MEDICAL & SURGICAL HISTORY:  Anemia  HTN (hypertension)  Asthma  Depression  Chronic low back pain  No significant past surgical history      No Known Allergies      acetaminophen   Tablet 650 milliGRAM(s) Oral every 6 hours PRN  ALBUTerol    90 MICROgram(s) HFA Inhaler 1 Puff(s) Inhalation every 4 hours  ALBUTerol/ipratropium for Nebulization 3 milliLiter(s) Nebulizer every 6 hours  azithromycin  IVPB      benzonatate 100 milliGRAM(s) Oral three times a day  cefTRIAXone   IVPB      nicotine - 21 mG/24Hr(s) Patch 1 patch Transdermal daily  tiotropium 18 MICROgram(s) Capsule 1 Capsule(s) Inhalation daily      Social Hx:    FAMILY HISTORY:  No pertinent family history in first degree relatives        ROS  [  ] UNABLE TO ELICIT    General:  [  ] None  [ x ] Fever  [ x ] Chills  [ x ] Malaise    Skin:  [ x ] None [  ] Rash  [  ] Wound  [  ] Ulcer    HEENT:  [ x ] None  [  ] Sore Throat  [  ] Nasal congestion/ runny nose  [  ] Photophobia [  ] Neck pain      Chest:  [  ] None   [  ] SOB  [ x ] Cough  [  ] None    Cardiovascular:   [ x ] None  [  ] CP  [  ] Palpitation    Gastrointestinal:  [  ] None  [  ] Abd pain   [ x ] Nausea    [ x ] Vomiting   [  ] Diarrhea	     Genitourinary:  [ x ] None [  ] Polyuria   [  ] Urgency  [  ] Frequency  [  ] Dysuria    [  ]  Hematuria       Musculoskeletal:  [  ] None [  ] Back Pain	[  ] Body aches  [  ] Joint pain  [ x ] Weakness    Neurological: [  ] None [  ]Dizziness  [  ]Visual Disturbance  [  ]Headaches   [ x ] Weakness      PHYSICAL EXAM:    Vital Signs Last 24 Hrs  T(C): 36.7 (06 Aug 2018 06:05), Max: 36.9 (05 Aug 2018 23:39)  T(F): 98 (06 Aug 2018 06:05), Max: 98.4 (05 Aug 2018 23:39)  HR: 67 (06 Aug 2018 06:05) (67 - 104)  BP: 121/80 (06 Aug 2018 06:05) (121/80 - 147/98)  BP(mean): --  RR: 18 (06 Aug 2018 06:05) (16 - 18)  SpO2: 99% (06 Aug 2018 06:05) (98% - 99%)    Constitutional:    HEENT: [ x ] Wnl  [  ] Pharyngeal congestion    Neck:  [ x ] Supple  [  ]Lymphadenopathy  [ x ] No JVD   [  ] JVD  [  ] Masses   [  ] WNL    CHEST/Respiratory:  [  ]Clear to auscultation  [  ] Rales   [  ] Rhonchi   [ x ] Wheezing (minimal)    [  ] Chest Tenderness      Cardiovascular:  [ x ] Reg S1 S2   [  ] Irreg S1 S2   [ x ]No Murmur  [  ] +ve Murmurs  [  ]Systolic [  ]Diastolic      Abdomen:  [ x ] Soft  [ x ] No tendrerness  [  ] Tenderness  [  ] Organomegaly  [  ] ABD Distention  [  ] Rigidity                       [ x ] No Regidity                       [ x ] No Rebound Tenderness  [  ] No Guarding Rigidity  [  ] Rebound Tenderness[  ] Guarding Rigidity                          [ x ]  +ve Bowel Sounds  [  ] Decreased Bowel Sounds    [  ] Absent Bowel Sounds                            Extremities: [ x ] No edema [  ] Edema  [  ] Clubbing   [  ] Cyanosis                         [ x ] No Tender Calf muscles  [  ] Tender Calf muscles                        [ x ] Palpable peripheral pulses    Neurological: [ x ] Awake  [ x ] Alert  [ x ] Oriented  x  3                           [  ] Confused  [  ] Drowsy  [  ] respond to painful stimuli  [  ] Unresponsive    Skin:  [ x ] Intact [  ] Redness [  ] Thrombophlebitis  [  ] Rashes  [  ] Dry  [  ] Ulcers    Ortho:  [  ] Joint Swelling  [  ] Joint erythema [  ] Joint tenderness                [  ] Increased temp. to touch  [  ] DJD [ x ] WNL      LABS/DIAGNOSTIC TESTS                          12.6   14.7  )-----------( 308      ( 06 Aug 2018 07:18 )             37.7     WBC Count: 14.7 K/uL (08-06 @ 07:18)  WBC Count: 17.0 K/uL (08-05 @ 16:33)      08-06    141  |  110<H>  |  15  ----------------------------<  188<H>  3.4<L>   |  20<L>  |  0.90    Ca    9.1      06 Aug 2018 07:18  Phos  4.1     08-06    TPro  9.3<H>  /  Alb  3.3<L>  /  TBili  0.4  /  DBili  x   /  AST  12  /  ALT  13  /  AlkPhos  94  08-05          LIVER FUNCTIONS - ( 05 Aug 2018 16:33 )  Alb: 3.3 g/dL / Pro: 9.3 g/dL / ALK PHOS: 94 U/L / ALT: 13 U/L DA / AST: 12 U/L / GGT: x             PT/INR - ( 05 Aug 2018 16:33 )   PT: 15.3 sec;   INR: 1.39 ratio         PTT - ( 05 Aug 2018 16:33 )  PTT:31.9 sec    LACTATE:Lactate, Blood: 1.1 mmol/L (08-05 @ 18:32)        CULTURES:   None yet.    RADIOLOGY      EXAM:  CT ANGIO CHEST (W)AW IC                            PROCEDURE DATE:  08/06/2018          INTERPRETATION:  HISTORY: Shortness of breath and elevated d-dimer.?   Cavitary mass on x-ray.    TECHNIQUE:  CT pulmonary angiography was performed of the chest according   to standard institutional protocol. Coronal, sagittal and transaxial 3-D   MIP reformatted images are provided from the transaxial source data.     68mL of Omnipaque 350 was administered without complication and 32 mL was   discarded.      COMPARISON: No similar prior study available for comparison.    FINDINGS:   There is good opacification of pulmonary arterial tree. No filling defect   is present to suggest acute pulmonary embolus.    The thyroid gland is unremarkable.     Evaluation of the lung parenchyma demonstrates a 8.3 x 5.3 x 5.4 cm   region of consolidation in the left upper lobe with associated   cavitation. There is an air-fluid level in the largest cavitary region.   There are adjacent nodular opacities to the inferior aspect of the   consolidation within the lingula of the left upper lobe. There is no   crossing of the procedure. The right lung is clear. There is no pleural   effusion or pneumothorax. The trachea and main bronchi are clear.    There are small nonspecific mediastinal and left hilar lymph nodes. The   largest mediastinal lymph node is a left precarinal node measuring 1.7   cm. Hilar lymph nodes measure up to 1.4 cm. There is no significant   supraclavicular, axillary or right hilar adenopathy.    The heart size is normal. There is no pericardial effusion. The thoracic   aorta is normal in caliber without dissection.     Limited images through the upper abdomen demonstrate no acute abnormality.    The visualized osseous structures are within normal limits.     IMPRESSION:  No pulmonary embolism.    Left upper lobe cavitary mass versus cavitary pneumonia. Small   mediastinal and left hilar lymph nodes which could be reactive from   infection or metastatic if malignancy.      EXAM:  XR CHEST PORTABLE IMMED 1V                            PROCEDURE DATE:  08/05/2018          INTERPRETATION:  Chest one view    HISTORY: Shortness of breath    COMPARISON STUDY: 4/28/2017    Frontal expiratory view of the chest shows the heartto be normal in   size. The lungs show left lower lobe superior segment infiltrate with   questionable cavitation within it and there is no evidence of   pneumothorax nor pleural effusion.    IMPRESSION:  Left lung infiltrate possibly with cavitation.

## 2018-08-06 NOTE — H&P ADULT - PROBLEM SELECTOR PLAN 2
Patient p/w left lower lobe cavitary lesion, will r/o TB even though suspicion is low, could be Staph aureus but patient not p/w high fevers  Follow up AFB's x 3, Quantiferon gold  Follow up CT chest  c/w Isolation for now Patient p/w left lower lobe cavitary lesion, will r/o TB, could be Staph aureus but patient not p/w high fevers.  Follow up AFB's x 3, Quantiferon gold  Follow up CT chest  c/w Isolation for now Patient p/w left lower lobe cavitary lesion, will r/o TB, could be Staph aureus but patient not p/w high fevers. ?Fungal cavitary lesion  Follow up AFB's x 3, Quantiferon gold  Follow up CT chest  c/w Isolation for now Patient p/w left lower lobe cavitary lesion, will r/o TB, could be Staph aureus but patient not p/w high fevers. ?Fungal cavitary lesion  Follow up AFB's x 3, Quantiferon gold, Aspergillus serology, HIV   Follow up CT chest  c/w Isolation for now

## 2018-08-06 NOTE — CONSULT NOTE ADULT - ASSESSMENT
53yo female, from home, c/o productive whitish cough, weakness and vomiting since past Saturday.  Patient was admitted for pneumonia and cavitary lung mass, and was started on UV Rocephin, and Azithromycin.

## 2018-08-06 NOTE — H&P ADULT - NSHPREVIEWOFSYSTEMS_GEN_ALL_CORE
GEN: no fever, no chills, + chronic lower back pain  RESP: no significant SOB, + occasional cough, + some grayish sputum  CVS: no chest pain, no palpitations, no edema  GI: no abdominal pain, no nausea, no vomiting, no constipation, no diarrhea  : no dysuria, no frequency, no hematuria  NEURO: no headache, no dizziness  PSYCH: no depression, not anxious  Derm : no itching, no rash

## 2018-08-06 NOTE — H&P ADULT - HISTORY OF PRESENT ILLNESS
51yo female, from home, c/o productive whitish cough, weakness and vomiting since past Saturday. Patient refers being unable to tolerate any oral intake. Patient also endorsing wheezing, subjective fever and night sweats. Denies any recent travel, most recent travel was 2 yrs ago to [place]. Denies weight loss, hemoptysis, hx TB or fungal infection, sick contacts. 53yo female, from home, c/o productive whitish cough, weakness and vomiting since past Saturday. Patient refers being unable to tolerate any oral intake. Patient also endorsing wheezing, subjective fever.. Denies any recent travel, most recent travel was 2 yrs ago to the [place]. Denies night sweats, weight loss, hemoptysis, hx TB or fungal infection, sick contacts. 53yo female, from home, c/o productive whitish cough, weakness and vomiting since past Saturday.   Patient refers being unable to tolerate any oral intake. Patient also endorsing wheezing, subjective fever.. Denies any recent travel, most recent travel was 2 yrs ago to the [place].   Denies night sweats, weight loss, hemoptysis, hx TB or fungal infection, sick contacts.

## 2018-08-06 NOTE — CONSULT NOTE ADULT - PROBLEM SELECTOR RECOMMENDATION 2
1- Airborne isolation.  2- Sputum for AFB X 3.  3- QuantiFeron gold test.  4- Sputum for cytology.  5- Sputum for bacterial and fungal culture. 1- Airborne isolation.  2- Sputum for AFB X 3.  3- QuantiFeron gold test.  4- Sputum for cytology.  5- Sputum for bacterial and fungal culture.  6- ESR, and CRP.

## 2018-08-06 NOTE — CONSULT NOTE ADULT - PROBLEM SELECTOR RECOMMENDATION 3
Panculture  antibiotics  monitor temp and WBC  ID consult  Sputum for AFB x 3  Repiratory isolation  Quantiferon TB Gold test
1- Anemia profile.  2- Iron supplements.  3- Closely monitor H & H.  4- Observe for bleeding.

## 2018-08-07 DIAGNOSIS — S13.4XXA SPRAIN OF LIGAMENTS OF CERVICAL SPINE, INITIAL ENCOUNTER: ICD-10-CM

## 2018-08-07 DIAGNOSIS — M54.42 LUMBAGO WITH SCIATICA, LEFT SIDE: ICD-10-CM

## 2018-08-07 LAB
ANION GAP SERPL CALC-SCNC: 10 MMOL/L — SIGNIFICANT CHANGE UP (ref 5–17)
BUN SERPL-MCNC: 16 MG/DL — SIGNIFICANT CHANGE UP (ref 7–18)
CALCIUM SERPL-MCNC: 9 MG/DL — SIGNIFICANT CHANGE UP (ref 8.4–10.5)
CHLORIDE SERPL-SCNC: 107 MMOL/L — SIGNIFICANT CHANGE UP (ref 96–108)
CO2 SERPL-SCNC: 23 MMOL/L — SIGNIFICANT CHANGE UP (ref 22–31)
CREAT SERPL-MCNC: 0.72 MG/DL — SIGNIFICANT CHANGE UP (ref 0.5–1.3)
CRP SERPL-MCNC: 18.6 MG/DL — HIGH (ref 0–0.4)
GAMMA INTERFERON BACKGROUND BLD IA-ACNC: 0.03 IU/ML — SIGNIFICANT CHANGE UP
GLUCOSE SERPL-MCNC: 97 MG/DL — SIGNIFICANT CHANGE UP (ref 70–99)
HCT VFR BLD CALC: 36.2 % — SIGNIFICANT CHANGE UP (ref 34.5–45)
HGB BLD-MCNC: 12.2 G/DL — SIGNIFICANT CHANGE UP (ref 11.5–15.5)
M TB IFN-G BLD-IMP: ABNORMAL
M TB IFN-G CD4+ BCKGRND COR BLD-ACNC: -0.01 IU/ML — SIGNIFICANT CHANGE UP
M TB IFN-G CD4+CD8+ BCKGRND COR BLD-ACNC: -0.02 IU/ML — SIGNIFICANT CHANGE UP
MAGNESIUM SERPL-MCNC: 2.2 MG/DL — SIGNIFICANT CHANGE UP (ref 1.6–2.6)
MCHC RBC-ENTMCNC: 31 PG — SIGNIFICANT CHANGE UP (ref 27–34)
MCHC RBC-ENTMCNC: 33.8 GM/DL — SIGNIFICANT CHANGE UP (ref 32–36)
MCV RBC AUTO: 91.7 FL — SIGNIFICANT CHANGE UP (ref 80–100)
NIGHT BLUE STAIN TISS: SIGNIFICANT CHANGE UP
NIGHT BLUE STAIN TISS: SIGNIFICANT CHANGE UP
PLATELET # BLD AUTO: 303 K/UL — SIGNIFICANT CHANGE UP (ref 150–400)
POTASSIUM SERPL-MCNC: 3.4 MMOL/L — LOW (ref 3.5–5.3)
POTASSIUM SERPL-SCNC: 3.4 MMOL/L — LOW (ref 3.5–5.3)
QUANT TB PLUS MITOGEN MINUS NIL: 0.43 IU/ML — SIGNIFICANT CHANGE UP
RBC # BLD: 3.95 M/UL — SIGNIFICANT CHANGE UP (ref 3.8–5.2)
RBC # FLD: 11.4 % — SIGNIFICANT CHANGE UP (ref 10.3–14.5)
SODIUM SERPL-SCNC: 140 MMOL/L — SIGNIFICANT CHANGE UP (ref 135–145)
SPECIMEN SOURCE: SIGNIFICANT CHANGE UP
SPECIMEN SOURCE: SIGNIFICANT CHANGE UP
T3FREE SERPL-MCNC: 2.07 PG/ML — SIGNIFICANT CHANGE UP (ref 1.8–4.6)
T4 FREE SERPL-MCNC: 1.1 NG/DL — SIGNIFICANT CHANGE UP (ref 0.9–1.8)
WBC # BLD: 14 K/UL — HIGH (ref 3.8–10.5)
WBC # FLD AUTO: 14 K/UL — HIGH (ref 3.8–10.5)

## 2018-08-07 PROCEDURE — 99233 SBSQ HOSP IP/OBS HIGH 50: CPT

## 2018-08-07 RX ORDER — POTASSIUM CHLORIDE 20 MEQ
40 PACKET (EA) ORAL ONCE
Qty: 0 | Refills: 0 | Status: COMPLETED | OUTPATIENT
Start: 2018-08-07 | End: 2018-08-07

## 2018-08-07 RX ORDER — KETOROLAC TROMETHAMINE 30 MG/ML
15 SYRINGE (ML) INJECTION EVERY 6 HOURS
Qty: 0 | Refills: 0 | Status: DISCONTINUED | OUTPATIENT
Start: 2018-08-07 | End: 2018-08-12

## 2018-08-07 RX ORDER — LANOLIN ALCOHOL/MO/W.PET/CERES
3 CREAM (GRAM) TOPICAL AT BEDTIME
Qty: 0 | Refills: 0 | Status: DISCONTINUED | OUTPATIENT
Start: 2018-08-07 | End: 2018-08-13

## 2018-08-07 RX ORDER — LACTOBACILLUS ACIDOPHILUS 100MM CELL
1 CAPSULE ORAL DAILY
Qty: 0 | Refills: 0 | Status: DISCONTINUED | OUTPATIENT
Start: 2018-08-07 | End: 2018-08-08

## 2018-08-07 RX ORDER — TRAMADOL HYDROCHLORIDE 50 MG/1
25 TABLET ORAL ONCE
Qty: 0 | Refills: 0 | Status: DISCONTINUED | OUTPATIENT
Start: 2018-08-07 | End: 2018-08-07

## 2018-08-07 RX ORDER — LIDOCAINE 4 G/100G
1 CREAM TOPICAL DAILY
Qty: 0 | Refills: 0 | Status: DISCONTINUED | OUTPATIENT
Start: 2018-08-07 | End: 2018-08-08

## 2018-08-07 RX ORDER — GABAPENTIN 400 MG/1
200 CAPSULE ORAL EVERY 8 HOURS
Qty: 0 | Refills: 0 | Status: DISCONTINUED | OUTPATIENT
Start: 2018-08-07 | End: 2018-08-13

## 2018-08-07 RX ORDER — LANOLIN ALCOHOL/MO/W.PET/CERES
3 CREAM (GRAM) TOPICAL ONCE
Qty: 0 | Refills: 0 | Status: COMPLETED | OUTPATIENT
Start: 2018-08-07 | End: 2018-08-07

## 2018-08-07 RX ORDER — GABAPENTIN 400 MG/1
100 CAPSULE ORAL THREE TIMES A DAY
Qty: 0 | Refills: 0 | Status: DISCONTINUED | OUTPATIENT
Start: 2018-08-07 | End: 2018-08-07

## 2018-08-07 RX ORDER — CYCLOBENZAPRINE HYDROCHLORIDE 10 MG/1
5 TABLET, FILM COATED ORAL THREE TIMES A DAY
Qty: 0 | Refills: 0 | Status: DISCONTINUED | OUTPATIENT
Start: 2018-08-07 | End: 2018-08-13

## 2018-08-07 RX ORDER — OXYCODONE HYDROCHLORIDE 5 MG/1
10 TABLET ORAL EVERY 6 HOURS
Qty: 0 | Refills: 0 | Status: DISCONTINUED | OUTPATIENT
Start: 2018-08-07 | End: 2018-08-12

## 2018-08-07 RX ADMIN — Medication 100 MILLIGRAM(S): at 06:01

## 2018-08-07 RX ADMIN — Medication 1 TABLET(S): at 21:27

## 2018-08-07 RX ADMIN — CEFTRIAXONE 100 GRAM(S): 500 INJECTION, POWDER, FOR SOLUTION INTRAMUSCULAR; INTRAVENOUS at 06:00

## 2018-08-07 RX ADMIN — Medication 3 MILLILITER(S): at 09:19

## 2018-08-07 RX ADMIN — LIDOCAINE 1 PATCH: 4 CREAM TOPICAL at 21:28

## 2018-08-07 RX ADMIN — GABAPENTIN 200 MILLIGRAM(S): 400 CAPSULE ORAL at 21:28

## 2018-08-07 RX ADMIN — Medication 100 MILLIGRAM(S): at 13:38

## 2018-08-07 RX ADMIN — OXYCODONE AND ACETAMINOPHEN 1 TABLET(S): 5; 325 TABLET ORAL at 14:30

## 2018-08-07 RX ADMIN — Medication 40 MILLIEQUIVALENT(S): at 21:28

## 2018-08-07 RX ADMIN — OXYCODONE AND ACETAMINOPHEN 1 TABLET(S): 5; 325 TABLET ORAL at 06:30

## 2018-08-07 RX ADMIN — GABAPENTIN 100 MILLIGRAM(S): 400 CAPSULE ORAL at 11:32

## 2018-08-07 RX ADMIN — Medication 3 MILLILITER(S): at 20:42

## 2018-08-07 RX ADMIN — Medication 3 MILLIGRAM(S): at 01:36

## 2018-08-07 RX ADMIN — TRAMADOL HYDROCHLORIDE 25 MILLIGRAM(S): 50 TABLET ORAL at 01:36

## 2018-08-07 RX ADMIN — Medication 3 MILLILITER(S): at 14:42

## 2018-08-07 RX ADMIN — OXYCODONE AND ACETAMINOPHEN 1 TABLET(S): 5; 325 TABLET ORAL at 13:38

## 2018-08-07 RX ADMIN — OXYCODONE AND ACETAMINOPHEN 1 TABLET(S): 5; 325 TABLET ORAL at 06:00

## 2018-08-07 RX ADMIN — GABAPENTIN 100 MILLIGRAM(S): 400 CAPSULE ORAL at 13:38

## 2018-08-07 RX ADMIN — TRAMADOL HYDROCHLORIDE 25 MILLIGRAM(S): 50 TABLET ORAL at 02:00

## 2018-08-07 RX ADMIN — AZITHROMYCIN 250 MILLIGRAM(S): 500 TABLET, FILM COATED ORAL at 06:00

## 2018-08-07 RX ADMIN — Medication 1 PATCH: at 13:45

## 2018-08-07 RX ADMIN — CYCLOBENZAPRINE HYDROCHLORIDE 5 MILLIGRAM(S): 10 TABLET, FILM COATED ORAL at 21:28

## 2018-08-07 RX ADMIN — Medication 1 PATCH: at 11:33

## 2018-08-07 RX ADMIN — OXYCODONE HYDROCHLORIDE 10 MILLIGRAM(S): 5 TABLET ORAL at 21:28

## 2018-08-07 RX ADMIN — OXYCODONE AND ACETAMINOPHEN 1 TABLET(S): 5; 325 TABLET ORAL at 00:01

## 2018-08-07 RX ADMIN — OXYCODONE HYDROCHLORIDE 10 MILLIGRAM(S): 5 TABLET ORAL at 21:58

## 2018-08-07 RX ADMIN — CYCLOBENZAPRINE HYDROCHLORIDE 5 MILLIGRAM(S): 10 TABLET, FILM COATED ORAL at 11:32

## 2018-08-07 NOTE — PROGRESS NOTE ADULT - PROBLEM SELECTOR PLAN 1
Patient p/w cough, fever and weakness for 1 week  On admission: Leukocytosis, afebrile, lactate wnL, not septic  CXR: left lung infiltrate w/ cavitation; RVP negative  Follow up CT chest for further evaluation  Follow up BCx, Sputum Culture, Aspergillus serology  c/w Rocephin + Zithromax for now  c/w Tessalon pearls + DUONEB's q6hrs  ID consult: Dr Lloyd -Patient p/w cough, fever and weakness for 1 week  -On admission: Leukocytosis, afebrile, lactate wnL, not septic  -CXR: left lung infiltrate w/ cavitation; RVP negative  -CT Chest: Cavitation to the Lt upper lobe  -Follow up -Sputum Culture, Aspergillus serology  -c/w Rocephin + Zithromax for now  -Will need bronch prior to dc.  Awaiting 3 neg sputum samples   -ID consult: Dr Lloyd  -Pulm: Dr. Wagner

## 2018-08-07 NOTE — PROGRESS NOTE ADULT - ASSESSMENT
53yo female, from home, c/o productive whitish cough, weakness and vomiting since past Saturday.    Patient being admitted for pNA with cavitary lesion, concerns for TB vs Fungal infx 51yo female, from home, c/o productive whitish cough, weakness and vomiting since past Saturday.    Patient being admitted for pNA with cavitary lesion, concerns for TB vs Fungal infx.

## 2018-08-07 NOTE — PROGRESS NOTE ADULT - SUBJECTIVE AND OBJECTIVE BOX
Patient is a 52y old  Female who presents with a chief complaint of SOB (06 Aug 2018 00:16)    Awake, alert, comfortable in bed in NAD. Complaining of pain in neck and upper back. Has hx of smoking and has been told to have Copd in the past.    INTERVAL HPI/OVERNIGHT EVENTS:      VITAL SIGNS:  T(F): 97.8 (08-07-18 @ 05:40)  HR: 86 (08-07-18 @ 05:40)  BP: 124/72 (08-07-18 @ 05:40)  RR: 17 (08-07-18 @ 05:40)  SpO2: 98% (08-07-18 @ 05:40)  Wt(kg): --  I&O's Detail          REVIEW OF SYSTEMS:    CONSTITUTIONAL:  No fevers, chills, sweats    HEENT:  Eyes:  No diplopia or blurred vision. ENT:  No earache, sore throat or runny nose.    CARDIOVASCULAR:  No pressure, squeezing, tightness, or heaviness about the chest; no palpitations.    RESPIRATORY:  Per HPI    GASTROINTESTINAL:  No abdominal pain, nausea, vomiting or diarrhea.    GENITOURINARY:  No dysuria, frequency or urgency.    NEUROLOGIC:  No paresthesias, fasciculations, seizures or weakness.    PSYCHIATRIC:  No disorder of thought or mood.      PHYSICAL EXAM:    Constitutional: Well developed and nourished  Eyes:Perrla  ENMT: normal  Neck:supple  Respiratory: + Rales on left upper back  Cardiovascular: S1 S2 regular  Gastrointestinal: Soft, Non tender  Extremities: No edema  Vascular:normal  Neurological:Awake, alert,Ox3  Musculoskeletal:Normal      MEDICATIONS  (STANDING):  ALBUTerol    90 MICROgram(s) HFA Inhaler 1 Puff(s) Inhalation every 4 hours  ALBUTerol/ipratropium for Nebulization 3 milliLiter(s) Nebulizer every 6 hours  azithromycin  IVPB      azithromycin  IVPB 500 milliGRAM(s) IV Intermittent every 24 hours  benzonatate 100 milliGRAM(s) Oral three times a day  cefTRIAXone   IVPB 1 Gram(s) IV Intermittent every 24 hours  cefTRIAXone   IVPB      gabapentin 100 milliGRAM(s) Oral three times a day  nicotine - 21 mG/24Hr(s) Patch 1 patch Transdermal daily  tiotropium 18 MICROgram(s) Capsule 1 Capsule(s) Inhalation daily    MEDICATIONS  (PRN):  acetaminophen   Tablet 650 milliGRAM(s) Oral every 6 hours PRN For Temp greater than 38 C (100.4 F)  cyclobenzaprine 5 milliGRAM(s) Oral three times a day PRN Muscle Spasm  oxyCODONE    5 mG/acetaminophen 325 mG 1 Tablet(s) Oral every 6 hours PRN Moderate Pain (4 - 6)      Allergies    No Known Allergies    Intolerances        LABS:                        12.2   14.0  )-----------( 303      ( 07 Aug 2018 06:46 )             36.2     08-07    140  |  107  |  16  ----------------------------<  97  3.4<L>   |  23  |  0.72    Ca    9.0      07 Aug 2018 06:46  Phos  4.1     08-06  Mg     2.2     08-07    TPro  9.3<H>  /  Alb  3.3<L>  /  TBili  0.4  /  DBili  x   /  AST  12  /  ALT  13  /  AlkPhos  94  08-05    PT/INR - ( 05 Aug 2018 16:33 )   PT: 15.3 sec;   INR: 1.39 ratio         PTT - ( 05 Aug 2018 16:33 )  PTT:31.9 sec      CARDIAC MARKERS ( 05 Aug 2018 16:33 )  <0.015 ng/mL / x     / 64 U/L / x     / <1.0 ng/mL      CAPILLARY BLOOD GLUCOSE        pro-bnp 191 08-05 @ 16:33     d-dimer 574  08-05 @ 16:33      RADIOLOGY & ADDITIONAL TESTS:    CXR:  < from: Xray Chest 1 View-PORTABLE IMMEDIATE (08.05.18 @ 17:03) >  IMPRESSION:  Left lung infiltrate possibly with cavitation.     < end of copied text >    Ct scan chest:  < from: CT Angio Chest w/ IV Cont (08.06.18 @ 00:24) >  IMPRESSION:  No pulmonary embolism.    Left upper lobe cavitary mass versus cavitary pneumonia. Small   mediastinal and left hilar lymph nodes which could be reactive from   infection or metastatic if malignancy.    < end of copied text >    ekg;    echo: Patient is a 52y old  Female who presents with a chief complaint of SOB (06 Aug 2018 00:16)  Awake, alert, comfortable in bed in NAD. Complaining of pain in neck and upper back. Has hx of smoking and has been told to have Copd in the past. Feeling better    INTERVAL HPI/OVERNIGHT EVENTS:      VITAL SIGNS:  T(F): 97.8 (08-07-18 @ 05:40)  HR: 86 (08-07-18 @ 05:40)  BP: 124/72 (08-07-18 @ 05:40)  RR: 17 (08-07-18 @ 05:40)  SpO2: 98% (08-07-18 @ 05:40)  Wt(kg): --  I&O's Detail          REVIEW OF SYSTEMS:    CONSTITUTIONAL:  No fevers, chills, sweats    HEENT:  Eyes:  No diplopia or blurred vision. ENT:  No earache, sore throat or runny nose.    CARDIOVASCULAR:  No pressure, squeezing, tightness, or heaviness about the chest; no palpitations.    RESPIRATORY:  Per HPI    GASTROINTESTINAL:  No abdominal pain, nausea, vomiting or diarrhea.    GENITOURINARY:  No dysuria, frequency or urgency.    NEUROLOGIC:  No paresthesias, fasciculations, seizures or weakness.    PSYCHIATRIC:  No disorder of thought or mood.      PHYSICAL EXAM:    Constitutional: Well developed and nourished  Eyes:Perrla  ENMT: normal  Neck:supple  Respiratory: + Rales on left upper back  Cardiovascular: S1 S2 regular  Gastrointestinal: Soft, Non tender  Extremities: No edema  Vascular:normal  Neurological:Awake, alert,Ox3  Musculoskeletal:Normal      MEDICATIONS  (STANDING):  ALBUTerol    90 MICROgram(s) HFA Inhaler 1 Puff(s) Inhalation every 4 hours  ALBUTerol/ipratropium for Nebulization 3 milliLiter(s) Nebulizer every 6 hours  azithromycin  IVPB      azithromycin  IVPB 500 milliGRAM(s) IV Intermittent every 24 hours  benzonatate 100 milliGRAM(s) Oral three times a day  cefTRIAXone   IVPB 1 Gram(s) IV Intermittent every 24 hours  cefTRIAXone   IVPB      gabapentin 100 milliGRAM(s) Oral three times a day  nicotine - 21 mG/24Hr(s) Patch 1 patch Transdermal daily  tiotropium 18 MICROgram(s) Capsule 1 Capsule(s) Inhalation daily    MEDICATIONS  (PRN):  acetaminophen   Tablet 650 milliGRAM(s) Oral every 6 hours PRN For Temp greater than 38 C (100.4 F)  cyclobenzaprine 5 milliGRAM(s) Oral three times a day PRN Muscle Spasm  oxyCODONE    5 mG/acetaminophen 325 mG 1 Tablet(s) Oral every 6 hours PRN Moderate Pain (4 - 6)      Allergies    No Known Allergies    Intolerances        LABS:                        12.2   14.0  )-----------( 303      ( 07 Aug 2018 06:46 )             36.2     08-07    140  |  107  |  16  ----------------------------<  97  3.4<L>   |  23  |  0.72    Ca    9.0      07 Aug 2018 06:46  Phos  4.1     08-06  Mg     2.2     08-07    TPro  9.3<H>  /  Alb  3.3<L>  /  TBili  0.4  /  DBili  x   /  AST  12  /  ALT  13  /  AlkPhos  94  08-05    PT/INR - ( 05 Aug 2018 16:33 )   PT: 15.3 sec;   INR: 1.39 ratio         PTT - ( 05 Aug 2018 16:33 )  PTT:31.9 sec      CARDIAC MARKERS ( 05 Aug 2018 16:33 )  <0.015 ng/mL / x     / 64 U/L / x     / <1.0 ng/mL      CAPILLARY BLOOD GLUCOSE        pro-bnp 191 08-05 @ 16:33     d-dimer 574  08-05 @ 16:33      RADIOLOGY & ADDITIONAL TESTS:    CXR:  < from: Xray Chest 1 View-PORTABLE IMMEDIATE (08.05.18 @ 17:03) >  IMPRESSION:  Left lung infiltrate possibly with cavitation.     < end of copied text >    Ct scan chest:  < from: CT Angio Chest w/ IV Cont (08.06.18 @ 00:24) >  IMPRESSION:  No pulmonary embolism.    Left upper lobe cavitary mass versus cavitary pneumonia. Small   mediastinal and left hilar lymph nodes which could be reactive from   infection or metastatic if malignancy.    < end of copied text >    ekg;    echo:

## 2018-08-07 NOTE — PROGRESS NOTE ADULT - PROBLEM SELECTOR PLAN 1
Most likely secondary to pneumonia howeve malignancy is a possibility in view of hx of smoking  May need Bronchoscopy. Most likely secondary to pneumonia however malignancy is a possibility in view of hx of smoking  May need Bronchoscopy after TB is ruled out

## 2018-08-07 NOTE — PROGRESS NOTE ADULT - SUBJECTIVE AND OBJECTIVE BOX
Meds:  azithromycin  IVPB      azithromycin  IVPB 500 milliGRAM(s) IV Intermittent every 24 hours  cefTRIAXone   IVPB 1 Gram(s) IV Intermittent every 24 hours  cefTRIAXone   IVPB        Allergies:  Allergies    No Known Allergies    Intolerances      ROS  [  ] UNABLE TO ELICIT    General:  [  ] None  [  ] Fever  [  ] Chills  [ x ] Malaise    Skin:  [ x ] None [  ] Rash  [  ] Wound  [  ] Ulcer    HEENT:  [ x ] None  [  ] Sore Throat  [  ] Nasal congestion/ runny nose  [  ] Photophobia [  ] Neck pain      Chest:  [  ] None   [  ] SOB  [ x ] Cough  [  ] None    Cardiovascular:   [ x ] None  [  ] CP  [  ] Palpitation    Gastrointestinal:  [  ] None  [  ] Abd pain   [  ] Nausea    [  ] Vomiting   [  ] Diarrhea [ x ] Anorexia	     Genitourinary:  [ x ] None [  ] Polyuria   [  ] Urgency  [  ] Frequency  [  ] Dysuria    [  ]  Hematuria       Musculoskeletal:  [  ] None [  ] Back Pain	[  ] Body aches  [  ] Joint pain  [ x ] Weakness    Neurological: [  ] None [  ]Dizziness  [  ]Visual Disturbance  [  ]Headaches   [ x ] Weakness          PHYSICAL EXAM:    Vital Signs Last 24 Hrs  T(C): 36.6 (07 Aug 2018 05:40), Max: 36.8 (07 Aug 2018 00:30)  T(F): 97.8 (07 Aug 2018 05:40), Max: 98.2 (07 Aug 2018 00:30)  HR: 86 (07 Aug 2018 05:40) (67 - 86)  BP: 124/72 (07 Aug 2018 05:40) (114/50 - 137/82)  BP(mean): --  RR: 17 (07 Aug 2018 05:40) (17 - 18)  SpO2: 98% (07 Aug 2018 05:40) (98% - 99%)    Constitutional:    HEENT: [ x ] Wnl  [  ] Pharyngeal congestion    Neck:  [ x ] Supple  [  ]Lymphadenopathy  [ x ] No JVD   [  ] JVD  [  ] Masses   [  ] WNL    CHEST/Respiratory:  [  ]Clear to auscultation  [  ] Rales   [  ] Rhonchi   [ x ] Wheezing (minimal)    [  ] Chest Tenderness      Cardiovascular:  [ x ] Reg S1 S2   [  ] Irreg S1 S2   [ x ]No Murmur  [  ] +ve Murmurs  [  ]Systolic [  ]Diastolic      Abdomen:  [ x ] Soft  [ x ] No tendrerness  [  ] Tenderness  [  ] Organomegaly  [  ] ABD Distention  [  ] Rigidity                       [ x ] No Regidity                       [ x ] No Rebound Tenderness  [  ] No Guarding Rigidity  [  ] Rebound Tenderness[  ] Guarding Rigidity                          [ x ]  +ve Bowel Sounds  [  ] Decreased Bowel Sounds    [  ] Absent Bowel Sounds                            Extremities: [ x ] No edema [  ] Edema  [  ] Clubbing   [  ] Cyanosis                         [ x ] No Tender Calf muscles  [  ] Tender Calf muscles                        [ x ] Palpable peripheral pulses    Neurological: [ x ] Awake  [ x ] Alert  [ x ] Oriented  x  3                           [  ] Confused  [  ] Drowsy  [  ] respond to painful stimuli  [  ] Unresponsive    Skin:  [ x ] Intact [  ] Redness [  ] Thrombophlebitis  [  ] Rashes  [  ] Dry  [  ] Ulcers    Ortho:  [  ] Joint Swelling  [  ] Joint erythema [  ] Joint tenderness                [  ] Increased temp. to touch  [  ] DJD [ x ] WNL          LABS/DIAGNOSTIC TESTS                          12.2   14.0  )-----------( 303      ( 07 Aug 2018 06:46 )             36.2         08-07    140  |  107  |  16  ----------------------------<  97  3.4<L>   |  23  |  0.72    Ca    9.0      07 Aug 2018 06:46  Phos  4.1     08-06  Mg     2.2     08-07    TPro  9.3<H>  /  Alb  3.3<L>  /  TBili  0.4  /  DBili  x   /  AST  12  /  ALT  13  /  AlkPhos  94  08-05      LIVER FUNCTIONS - ( 05 Aug 2018 16:33 )  Alb: 3.3 g/dL / Pro: 9.3 g/dL / ALK PHOS: 94 U/L / ALT: 13 U/L DA / AST: 12 U/L / GGT: x               Rapid RVP Result: NotDetec  HIV-1/2 Antigen/Antibody Screen by [facility] (08.06.18 @ 11:18)    HIV-1/2 Combo Result: Nonreact      CULTURES:   Culture - Blood (08.05.18 @ 22:00)    Specimen Source: .Blood Blood    Culture Results:   No growth to date.    Culture - Blood (08.05.18 @ 22:00)    Specimen Source: .Blood Blood    Culture Results:   No growth to date.      RADIOLOGY      EXAM:  CT ANGIO CHEST (W)AW IC                            PROCEDURE DATE:  08/06/2018          INTERPRETATION:  HISTORY: Shortness of breath and elevated d-dimer.?   Cavitary mass on x-ray.    TECHNIQUE:  CT pulmonary angiography was performed of the chest according   to standard institutional protocol. Coronal, sagittal and transaxial 3-D   MIP reformatted images are provided from the transaxial source data.     68mL of Omnipaque 350 was administered without complication and 32 mL was   discarded.      COMPARISON: No similar prior study available for comparison.    FINDINGS:   There is good opacification of pulmonary arterial tree. No filling defect   is present to suggest acute pulmonary embolus.    The thyroid gland is unremarkable.     Evaluation of the lung parenchyma demonstrates a 8.3 x 5.3 x 5.4 cm   region of consolidation in the left upper lobe with associated   cavitation. There is an air-fluid level in the largest cavitary region.   There are adjacent nodular opacities to the inferior aspect of the   consolidation within the lingula of the left upper lobe. There is no   crossing of the procedure. The right lung is clear. There is no pleural   effusion or pneumothorax. The trachea and main bronchi are clear.    There are small nonspecific mediastinal and left hilar lymph nodes. The   largest mediastinal lymph node is a left precarinal node measuring 1.7   cm. Hilar lymph nodes measure up to 1.4 cm. There is no significant   supraclavicular, axillary or right hilar adenopathy.    The heart size is normal. There is no pericardial effusion. The thoracic   aorta is normal in caliber without dissection.     Limited images through the upper abdomen demonstrate no acute abnormality.    The visualized osseous structures are within normal limits.     IMPRESSION:  No pulmonary embolism.    Left upper lobe cavitary mass versus cavitary pneumonia. Small   mediastinal and left hilar lymph nodes which could be reactive from   infection or metastatic if malignancy.      EXAM:  XR CHEST PORTABLE IMMED 1V                            PROCEDURE DATE:  08/05/2018          INTERPRETATION:  Chest one view    HISTORY: Shortness of breath    COMPARISON STUDY: 4/28/2017    Frontal expiratory view of the chest shows the heartto be normal in   size. The lungs show left lower lobe superior segment infiltrate with   questionable cavitation within it and there is no evidence of   pneumothorax nor pleural effusion.    IMPRESSION:  Left lung infiltrate possibly with cavitation.         Assessment and Recommendation:   53yo female, from home, c/o productive whitish cough, weakness and vomiting since past Saturday.  Patient was admitted for pneumonia and cavitary lung mass, and was started on UV Rocephin, and Azithromycin.  8/7/18 Patient is afebrile and WBC is improving.    Problem/Recommendation - 1:  Problem: PNA (pneumonia).   Recommendation:   1- Continue Rocephin.  2- Continue Azithromycin.  3- O2 as needed.  4- Pulmonary management.  5- F/u CXR.  6- Follow blood cultures to final reports.    Problem/Recommendation - 2:  ·  Problem: Cavitating mass of lower lobe of left lung.    Recommendation:   1- Airborne isolation.  2- Sputum for AFB X 3.  3- QuantiFeron gold test.  4- Sputum for cytology.  5- Sputum for bacterial and fungal culture.  6- ESR, and CRP.    Problem/Recommendation - 3:  ·  Problem: Anemia.    Recommendation:   1- Anemia profile.  2- Iron supplements.  3- Closely monitor H & H.  4- Observe for bleeding.     Problem/Recommendation - 4:  ·  Problem: HTN (hypertension).    Recommendation:   1- Monitor Blood pressure closely.  2- Blood pressure control.  3- BP. meds as per cardiology and primary care team.     Discussed with medical resident.

## 2018-08-07 NOTE — PROGRESS NOTE ADULT - PROBLEM SELECTOR PLAN 2
- f/u c-spine xray   - c/w toradol 15 mg q 6 hrs for moderate pain   - felexeril   - oob likely muscular pain   - f/u c-spine xray   - c/w toradol 15 mg q 6 hrs for moderate pain   - felexeril   - oob

## 2018-08-07 NOTE — PROGRESS NOTE ADULT - NECK DETAILS
paraspinal ttp over trapezius muscle po paraspinal ttp over trapezius muscle po, no spinal point ttp

## 2018-08-07 NOTE — PROGRESS NOTE ADULT - PROBLEM SELECTOR PLAN 1
- oxycodone 10 mg q 6 hrs for severe pain prn x 3 days only than switch to tramadol   - toradol 15 mg q 6 hrs prn for moderate pain   - tylenol 650 mg po q 6hrs prn for mild pain  - c/w flexeril 10 mg TID prn for muscle spasms  - increase gabapentin to 200mg q 8 hrs   - lidocaine patch daily   - OOB, ambulate as tolerated

## 2018-08-07 NOTE — PROGRESS NOTE ADULT - PROBLEM SELECTOR PLAN 3
Panculture  antibiotics  monitor temp and WBC  ID consult  Sputum for AFB x 3  Repiratory isolation  Quantiferon TB Gold test.

## 2018-08-07 NOTE — PROGRESS NOTE ADULT - SUBJECTIVE AND OBJECTIVE BOX
PGY 1 Note discussed with supervising resident and primary attending    Patient is a 52y old  Female who presents with a chief complaint of SOB (06 Aug 2018 00:16)      INTERVAL HPI/OVERNIGHT EVENTS: no new complaints    MEDICATIONS  (STANDING):  ALBUTerol    90 MICROgram(s) HFA Inhaler 1 Puff(s) Inhalation every 4 hours  ALBUTerol/ipratropium for Nebulization 3 milliLiter(s) Nebulizer every 6 hours  azithromycin  IVPB      azithromycin  IVPB 500 milliGRAM(s) IV Intermittent every 24 hours  benzonatate 100 milliGRAM(s) Oral three times a day  cefTRIAXone   IVPB 1 Gram(s) IV Intermittent every 24 hours  cefTRIAXone   IVPB      gabapentin 100 milliGRAM(s) Oral three times a day  nicotine - 21 mG/24Hr(s) Patch 1 patch Transdermal daily  tiotropium 18 MICROgram(s) Capsule 1 Capsule(s) Inhalation daily    MEDICATIONS  (PRN):  acetaminophen   Tablet 650 milliGRAM(s) Oral every 6 hours PRN For Temp greater than 38 C (100.4 F)  cyclobenzaprine 5 milliGRAM(s) Oral three times a day PRN Muscle Spasm  oxyCODONE    5 mG/acetaminophen 325 mG 1 Tablet(s) Oral every 6 hours PRN Moderate Pain (4 - 6)      __________________________________________________  REVIEW OF SYSTEMS:    CONSTITUTIONAL: No fever,   EYES: no acute visual disturbances  NECK: No pain or stiffness  RESPIRATORY: No cough; No shortness of breath  CARDIOVASCULAR: No chest pain, no palpitations  GASTROINTESTINAL: No pain. No nausea or vomiting; No diarrhea   NEUROLOGICAL: No headache or numbness, no tremors  MUSCULOSKELETAL: No joint pain, no muscle pain  GENITOURINARY: no dysuria, no frequency, no hesitancy  PSYCHIATRY: no depression , no anxiety  ALL OTHER  ROS negative        Vital Signs Last 24 Hrs  T(C): 36.6 (07 Aug 2018 05:40), Max: 36.8 (07 Aug 2018 00:30)  T(F): 97.8 (07 Aug 2018 05:40), Max: 98.2 (07 Aug 2018 00:30)  HR: 86 (07 Aug 2018 05:40) (67 - 86)  BP: 124/72 (07 Aug 2018 05:40) (114/50 - 137/82)  BP(mean): --  RR: 17 (07 Aug 2018 05:40) (17 - 18)  SpO2: 98% (07 Aug 2018 05:40) (98% - 99%)    ________________________________________________  PHYSICAL EXAM:  GENERAL: NAD  HEENT:Normocephalic;  conjunctivae and sclerae clear; moist mucous membranes;   NECK : supple  CHEST/LUNG: Clear to auscuitation bilaterally with good air entry   HEART: S1 S2  regular; no murmurs, gallops or rubs  ABDOMEN: Soft, Nontender, Nondistended; Bowel sounds present  EXTREMITIES: no cyanosis; no edema; no calf tenderness  NERVOUS SYSTEM:  Awake and alert; Oriented  to place, person and time ; no new deficits    _________________________________________________  LABS:                        12.2   14.0  )-----------( 303      ( 07 Aug 2018 06:46 )             36.2     08-07    140  |  107  |  16  ----------------------------<  97  3.4<L>   |  23  |  0.72    Ca    9.0      07 Aug 2018 06:46  Phos  4.1     08-06  Mg     2.2     08-07    TPro  9.3<H>  /  Alb  3.3<L>  /  TBili  0.4  /  DBili  x   /  AST  12  /  ALT  13  /  AlkPhos  94  08-05    PT/INR - ( 05 Aug 2018 16:33 )   PT: 15.3 sec;   INR: 1.39 ratio         PTT - ( 05 Aug 2018 16:33 )  PTT:31.9 sec    CAPILLARY BLOOD GLUCOSE            RADIOLOGY & ADDITIONAL TESTS:    Imaging Personally Reviewed:  YES/NO    Consultant(s) Notes Reviewed:   YES/ No    Care Discussed with Consultants :     Plan of care was discussed with patient and /or primary care giver; all questions and concerns were addressed and care was aligned with patient's wishes. PGY 1 Note discussed with supervising resident and primary attending    Patient is a 52y old  Female who presents with a chief complaint of SOB (06 Aug 2018 00:16)      INTERVAL HPI/OVERNIGHT EVENTS: No acute events reported overnight.  Today pt presents in no acute distress.  Pt found resting comfortably in bed.  Pt complains of mild neck discomfort.        MEDICATIONS  (STANDING):  ALBUTerol    90 MICROgram(s) HFA Inhaler 1 Puff(s) Inhalation every 4 hours  ALBUTerol/ipratropium for Nebulization 3 milliLiter(s) Nebulizer every 6 hours  azithromycin  IVPB      azithromycin  IVPB 500 milliGRAM(s) IV Intermittent every 24 hours  benzonatate 100 milliGRAM(s) Oral three times a day  cefTRIAXone   IVPB 1 Gram(s) IV Intermittent every 24 hours  cefTRIAXone   IVPB      gabapentin 100 milliGRAM(s) Oral three times a day  nicotine - 21 mG/24Hr(s) Patch 1 patch Transdermal daily  tiotropium 18 MICROgram(s) Capsule 1 Capsule(s) Inhalation daily    MEDICATIONS  (PRN):  acetaminophen   Tablet 650 milliGRAM(s) Oral every 6 hours PRN For Temp greater than 38 C (100.4 F)  cyclobenzaprine 5 milliGRAM(s) Oral three times a day PRN Muscle Spasm  oxyCODONE    5 mG/acetaminophen 325 mG 1 Tablet(s) Oral every 6 hours PRN Moderate Pain (4 - 6)        __________________________________________________  REVIEW OF SYSTEMS:    CONSTITUTIONAL: No fever,   EYES: no acute visual disturbances  NECK: No pain or stiffness  RESPIRATORY: No cough; No shortness of breath  CARDIOVASCULAR: No chest pain, no palpitations  GASTROINTESTINAL: No pain. No nausea or vomiting; No diarrhea   NEUROLOGICAL: No headache or numbness, no tremors  MUSCULOSKELETAL: No joint pain, no muscle pain       Vital Signs Last 24 Hrs  T(C): 36.6 (07 Aug 2018 05:40), Max: 36.8 (07 Aug 2018 00:30)  T(F): 97.8 (07 Aug 2018 05:40), Max: 98.2 (07 Aug 2018 00:30)  HR: 86 (07 Aug 2018 05:40) (67 - 86)  BP: 124/72 (07 Aug 2018 05:40) (114/50 - 137/82)  RR: 17 (07 Aug 2018 05:40) (17 - 18)  SpO2: 98% (07 Aug 2018 05:40) (98% - 99%)    ________________________________________________  PHYSICAL EXAM:  GENERAL: NAD  HEENT: Normocephalic;  conjunctivae and sclerae clear; moist mucous membranes;   NECK : supple; Pain with neck flexion   CHEST/LUNG: Ronchorus breath sound to [place]  HEART: S1 S2  regular; no murmurs, gallops or rubs  ABDOMEN: Soft, Nontender, Nondistended; Bowel sounds present  EXTREMITIES: no cyanosis; no edema; no calf tenderness  NERVOUS SYSTEM:  Awake and alert; Oriented  to place, person and time ; no new deficits    _________________________________________________  LABS:                                   12.2   14.0  )-----------( 303      ( 07 Aug 2018 06:46 )             36.2       08-07    140  |  107  |  16  ----------------------------<  97  3.4<L>   |  23  |  0.72    Ca    9.0      07 Aug 2018 06:46  Phos  4.1     08-06  Mg     2.2     08-07    TPro  9.3<H>  /  Alb  3.3<L>  /  TBili  0.4  /  DBili  x   /  AST  12  /  ALT  13  /  AlkPhos  94  08-05  '    RADIOLOGY & ADDITIONAL TESTS:    Imaging Personally Reviewed:  YES    Consultant(s) Notes Reviewed:   YES    Care Discussed with Consultants :     Plan of care was discussed with patient and /or primary care giver; all questions and concerns were addressed and care was aligned with patient's wishes.

## 2018-08-07 NOTE — PROGRESS NOTE ADULT - PROBLEM SELECTOR PLAN 2
Patient p/w left lower lobe cavitary lesion, will r/o TB, could be Staph aureus but patient not p/w high fevers. ?Fungal cavitary lesion  Follow up AFB's x 3, Quantiferon gold, Aspergillus serology, HIV   Follow up CT chest  c/w Isolation for now -Patient p/w left lower lobe cavitary lesion, will r/o TB, could be Staph aureus but -patient not p/w high fevers. ?Fungal cavitary lesion  -Follow up AFB's x 3, Quantiferon gold, Aspergillus serology, HIV   -Follow up CT chest  -Pt will need bronch prior to dc  -Pulm: Dr. Wagner
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
## 2018-08-07 NOTE — PROGRESS NOTE ADULT - MUSCULOSKELETAL
details… detailed exam normal strength/ROM intact/decreased ROM due to pain/no joint erythema/no joint swelling/no calf tenderness/no joint warmth

## 2018-08-07 NOTE — PROGRESS NOTE ADULT - PROBLEM SELECTOR PLAN 3
Patient on Bystolic 2.5 mg QD at home  Monitor BP and adjust medication if clinically indicated -Patient on Bystolic 2.5 mg QD at home  -Monitor BP and adjust medication if clinically indicated

## 2018-08-07 NOTE — PROGRESS NOTE ADULT - SUBJECTIVE AND OBJECTIVE BOX
_________________________________________________________________________________________  ========>>  M E D I C A L   A T T E N D I N G    F O L L O W  U P  N O T E  <<=========  -----------------------------------------------------------------------------------------------------    - Patient seen and examined by me earlier today.   - In summary,  JOSE MAYER is a 52y year old woman who originally presented with cough, SOB  - Patient today overall doing ok, comfortable, eating OK.      ==================>> REVIEW OF SYSTEM <<=================    GEN: no fever, no chills, + multilevel pain including chronic back and some neck pain, not well controlled this morning   RESP: no SOB at rest, ++ cough and some sputum, improved   CVS: no chest pain, no palpitations, no edema  GI: no abdominal pain, no nausea, ++ diarrhea this morning X1  : no dysuria, no frequency, no hematuria  Neuro: no headache, no dizziness  Derm : no itching, no rash    ==================>> PHYSICAL EXAM <<=================    GEN: A&O X 3 , NAD , comfortable  HEENT: NCAT, PERRL, MMM, hearing intact  Neck: supple , no JVD  CVS: S1S2 , regular , No M/R/G appreciated  PULM: no significant wheezing or rhonchi noted   ABD.: soft. non tender, non distended,  bowel sounds present  Extrem: intact pulses , no edema   PSYCH : normal mood,  not anxious      ==================>> MEDICATIONS <<====================    MEDICATIONS  (STANDING):  ALBUTerol    90 MICROgram(s) HFA Inhaler 1 Puff(s) Inhalation every 4 hours  ALBUTerol/ipratropium for Nebulization 3 milliLiter(s) Nebulizer every 6 hours  azithromycin  IVPB      azithromycin  IVPB 500 milliGRAM(s) IV Intermittent every 24 hours  benzonatate 100 milliGRAM(s) Oral three times a day  cefTRIAXone   IVPB 1 Gram(s) IV Intermittent every 24 hours  cefTRIAXone   IVPB      gabapentin 200 milliGRAM(s) Oral every 8 hours  lactobacillus acidophilus 1 Tablet(s) Oral daily  lidocaine   Patch 1 Patch Transdermal daily  nicotine - 21 mG/24Hr(s) Patch 1 patch Transdermal daily  tiotropium 18 MICROgram(s) Capsule 1 Capsule(s) Inhalation daily    MEDICATIONS  (PRN):  acetaminophen   Tablet 650 milliGRAM(s) Oral every 6 hours PRN For Temp greater than 38 C (100.4 F)  cyclobenzaprine 5 milliGRAM(s) Oral three times a day PRN Muscle Spasm  ketorolac   Injectable 15 milliGRAM(s) IV Push every 6 hours PRN Moderate Pain (4 - 6)  oxyCODONE    IR 10 milliGRAM(s) Oral every 6 hours PRN Severe Pain (7 - 10)      ==================>> VITAL SIGNS <<==================    T(C): 37.8 (08-07-18 @ 13:36), Max: 37.8 (08-07-18 @ 13:36)  HR: 82 (08-07-18 @ 13:36) (67 - 86)  BP: 136/67 (08-07-18 @ 13:36) (114/50 - 136/67)  RR: 17 (08-07-18 @ 13:36) (17 - 18)  SpO2: 97% (08-07-18 @ 13:36) (97% - 99%)     ==================>> LAB AND IMAGING <<==================                        12.2   14.0  )-----------( 303      ( 07 Aug 2018 06:46 )             36.2        08-07    140  |  107  |  16  ----------------------------<  97  3.4<L>   |  23  |  0.72    Ca    9.0      07 Aug 2018 06:46  Phos  4.1     08-06  Mg     2.2     08-07       TSH:      0.13   (08-06-18)           Lipid profile:  (08-06-18)     Total: 110     LDL  : 58     HDL  :29     TG   :116     HgA1C: 6.1  (08-06-18)            _______________________  C U L T U R E S :    Culture - Acid Fast - Sputum w/Smear (collected 06 Aug 2018 18:25)  Source: .Sputum Sputum    Culture - Acid Fast - Sputum w/Smear (collected 06 Aug 2018 18:10)  Source: .Sputum Sputum    Culture - Blood (collected 05 Aug 2018 22:00)  Source: .Blood Blood  Preliminary Report (06 Aug 2018 22:02):    No growth to date.    Culture - Blood (collected 05 Aug 2018 22:00)  Source: .Blood Blood  Preliminary Report (06 Aug 2018 22:02):    No growth to date.    ___________________________________________________________________________________  ===============>>  A S S E S S M E N T   A N D   P L A N <<===============  ------------------------------------------------------------------------------------------    · Assessment      53yo female, from home, c/o productive whitish cough, weakness and vomiting since past Saturday.    Patient being admitted for pNA with cavitary lesion, concerns for TB vs Fungal infx.    Problem/Plan - 1:  ·  Problem: PNA with left lung infiltrate w/ cavitation    -CT Chest: Cavitation to the Lt upper lobe  -Follow up -Sputum Culture, and AFB  -c/w Rocephin + Zithromax for now  -likely for bronch at later time   -ID and  Pulm appreciated.- BAcid given diarrhea     Problem/Plan - 2:  ·  Problem: chronic back pain   appreciated pain mgmt  continue meds as increased.  Problem/Plan - 3:  ·  Problem: HTN   - Continue Current medications as above  -Monitor BP and adjust medication if clinically indicated.  supplement hypokalemia and monitor     -GI/DVT Prophylaxis.    --------------------------------------------  Case discussed with pt, HS  Education given on findings and plan of care  ___________________________  H. MINGO Burns.  Pager: [phone] _________________________________________________________________________________________  ========>>  M E D I C A L   A T T E N D I N G    F O L L O W  U P  N O T E  <<=========  -----------------------------------------------------------------------------------------------------    - Patient seen and examined by me earlier today.   - In summary,  JOSE MAYER is a 52y year old woman who originally presented with cough, SOB  - Patient today overall doing ok, comfortable, eating OK.      ==================>> REVIEW OF SYSTEM <<=================    GEN: no fever, no chills, + multilevel pain including chronic back and some neck pain, not well controlled this morning   RESP: no SOB at rest, ++ cough and some sputum, improved   CVS: no chest pain, no palpitations, no edema  GI: no abdominal pain, no nausea, ++ diarrhea this morning X1  : no dysuria, no frequency, no hematuria  Neuro: no headache, no dizziness  Derm : no itching, no rash    ==================>> PHYSICAL EXAM <<=================    GEN: A&O X 3 , NAD , comfortable  HEENT: NCAT, PERRL, MMM, hearing intact  Neck: supple , no JVD  CVS: S1S2 , regular , No M/R/G appreciated  PULM: no significant wheezing or rhonchi noted   ABD.: soft. non tender, non distended,  bowel sounds present  Extrem: intact pulses , no edema   PSYCH : normal mood,  not anxious      ==================>> MEDICATIONS <<====================    MEDICATIONS  (STANDING):  ALBUTerol    90 MICROgram(s) HFA Inhaler 1 Puff(s) Inhalation every 4 hours  ALBUTerol/ipratropium for Nebulization 3 milliLiter(s) Nebulizer every 6 hours  azithromycin  IVPB      azithromycin  IVPB 500 milliGRAM(s) IV Intermittent every 24 hours  benzonatate 100 milliGRAM(s) Oral three times a day  cefTRIAXone   IVPB 1 Gram(s) IV Intermittent every 24 hours  cefTRIAXone   IVPB      gabapentin 200 milliGRAM(s) Oral every 8 hours  lactobacillus acidophilus 1 Tablet(s) Oral daily  lidocaine   Patch 1 Patch Transdermal daily  nicotine - 21 mG/24Hr(s) Patch 1 patch Transdermal daily  tiotropium 18 MICROgram(s) Capsule 1 Capsule(s) Inhalation daily    MEDICATIONS  (PRN):  acetaminophen   Tablet 650 milliGRAM(s) Oral every 6 hours PRN For Temp greater than 38 C (100.4 F)  cyclobenzaprine 5 milliGRAM(s) Oral three times a day PRN Muscle Spasm  ketorolac   Injectable 15 milliGRAM(s) IV Push every 6 hours PRN Moderate Pain (4 - 6)  oxyCODONE    IR 10 milliGRAM(s) Oral every 6 hours PRN Severe Pain (7 - 10)      ==================>> VITAL SIGNS <<==================    T(C): 37.8 (08-07-18 @ 13:36), Max: 37.8 (08-07-18 @ 13:36)  HR: 82 (08-07-18 @ 13:36) (67 - 86)  BP: 136/67 (08-07-18 @ 13:36) (114/50 - 136/67)  RR: 17 (08-07-18 @ 13:36) (17 - 18)  SpO2: 97% (08-07-18 @ 13:36) (97% - 99%)     ==================>> LAB AND IMAGING <<==================                        12.2   14.0  )-----------( 303      ( 07 Aug 2018 06:46 )             36.2        08-07    140  |  107  |  16  ----------------------------<  97  3.4<L>   |  23  |  0.72    Ca    9.0      07 Aug 2018 06:46  Phos  4.1     08-06  Mg     2.2     08-07       TSH:      0.13   (08-06-18)           Lipid profile:  (08-06-18)     Total: 110     LDL  : 58     HDL  :29     TG   :116     HgA1C: 6.1  (08-06-18)            _______________________  C U L T U R E S :    Culture - Acid Fast - Sputum w/Smear (collected 06 Aug 2018 18:25)  Source: .Sputum Sputum    Culture - Acid Fast - Sputum w/Smear (collected 06 Aug 2018 18:10)  Source: .Sputum Sputum    Culture - Blood (collected 05 Aug 2018 22:00)  Source: .Blood Blood  Preliminary Report (06 Aug 2018 22:02):    No growth to date.    Culture - Blood (collected 05 Aug 2018 22:00)  Source: .Blood Blood  Preliminary Report (06 Aug 2018 22:02):    No growth to date.    ___________________________________________________________________________________  ===============>>  A S S E S S M E N T   A N D   P L A N <<===============  ------------------------------------------------------------------------------------------    · Assessment		  51yo female, from home, c/o productive whitish cough, weakness and vomiting since past Saturday.    Patient being admitted for pNA with cavitary lesion, concerns for TB vs Fungal infx.    Problem/Plan - 1:  ·  Problem: PNA with left lung infiltrate w/ cavitation    -CT Chest: Cavitation to the Lt upper lobe  -Follow up -Sputum Culture, and AFB  -c/w Rocephin + Zithromax for now  -likely for bronch at later time   -ID and  Pulm appreciated.- BAcid given diarrhea     Problem/Plan - 2:  ·  Problem: chronic back pain   appreciated pain mgmt  continue meds as increased.    Problem/Plan - 3:  ·  Problem: HTN   - Continue Current medications as above  -Monitor BP and adjust medication if clinically indicated.  supplement hypokalemia and monitor     Problem/Plan - 4:  ·  Problem: Pre-diabetes  had a long discussion about diagnosis, educated about life style modification, diet, weight loss  no med needed at this time for discharge  monitor FS with RISS as above    -GI/DVT Prophylaxis.    --------------------------------------------  Case discussed with pt, HS  Education given on findings and plan of care  ___________________________  H. MINGO Burns.  Pager: [phone]

## 2018-08-08 LAB
ANION GAP SERPL CALC-SCNC: 11 MMOL/L — SIGNIFICANT CHANGE UP (ref 5–17)
BUN SERPL-MCNC: 11 MG/DL — SIGNIFICANT CHANGE UP (ref 7–18)
CALCIUM SERPL-MCNC: 9.3 MG/DL — SIGNIFICANT CHANGE UP (ref 8.4–10.5)
CHLORIDE SERPL-SCNC: 106 MMOL/L — SIGNIFICANT CHANGE UP (ref 96–108)
CO2 SERPL-SCNC: 20 MMOL/L — LOW (ref 22–31)
CREAT SERPL-MCNC: 0.81 MG/DL — SIGNIFICANT CHANGE UP (ref 0.5–1.3)
GLUCOSE SERPL-MCNC: 104 MG/DL — HIGH (ref 70–99)
HCT VFR BLD CALC: 40.6 % — SIGNIFICANT CHANGE UP (ref 34.5–45)
HGB BLD-MCNC: 13.7 G/DL — SIGNIFICANT CHANGE UP (ref 11.5–15.5)
MAGNESIUM SERPL-MCNC: 2.3 MG/DL — SIGNIFICANT CHANGE UP (ref 1.6–2.6)
MCHC RBC-ENTMCNC: 30.6 PG — SIGNIFICANT CHANGE UP (ref 27–34)
MCHC RBC-ENTMCNC: 33.7 GM/DL — SIGNIFICANT CHANGE UP (ref 32–36)
MCV RBC AUTO: 90.9 FL — SIGNIFICANT CHANGE UP (ref 80–100)
NIGHT BLUE STAIN TISS: SIGNIFICANT CHANGE UP
PHOSPHATE SERPL-MCNC: 3.2 MG/DL — SIGNIFICANT CHANGE UP (ref 2.5–4.5)
PLATELET # BLD AUTO: 373 K/UL — SIGNIFICANT CHANGE UP (ref 150–400)
POTASSIUM SERPL-MCNC: 4 MMOL/L — SIGNIFICANT CHANGE UP (ref 3.5–5.3)
POTASSIUM SERPL-SCNC: 4 MMOL/L — SIGNIFICANT CHANGE UP (ref 3.5–5.3)
RBC # BLD: 4.47 M/UL — SIGNIFICANT CHANGE UP (ref 3.8–5.2)
RBC # FLD: 11.2 % — SIGNIFICANT CHANGE UP (ref 10.3–14.5)
S PNEUM AG SER QL: SIGNIFICANT CHANGE UP
SODIUM SERPL-SCNC: 137 MMOL/L — SIGNIFICANT CHANGE UP (ref 135–145)
SPECIMEN SOURCE: SIGNIFICANT CHANGE UP
T4 AB SER-ACNC: 9.9 UG/DL — SIGNIFICANT CHANGE UP (ref 4.6–12)
TSH SERPL-MCNC: 0.51 UU/ML — SIGNIFICANT CHANGE UP (ref 0.34–4.82)
WBC # BLD: 16.5 K/UL — HIGH (ref 3.8–10.5)
WBC # FLD AUTO: 16.5 K/UL — HIGH (ref 3.8–10.5)

## 2018-08-08 PROCEDURE — 71045 X-RAY EXAM CHEST 1 VIEW: CPT | Mod: 26

## 2018-08-08 RX ORDER — SODIUM CHLORIDE 9 MG/ML
10 INJECTION INTRAMUSCULAR; INTRAVENOUS; SUBCUTANEOUS ONCE
Qty: 0 | Refills: 0 | Status: COMPLETED | OUTPATIENT
Start: 2018-08-08 | End: 2018-08-08

## 2018-08-08 RX ORDER — PIPERACILLIN AND TAZOBACTAM 4; .5 G/20ML; G/20ML
3.38 INJECTION, POWDER, LYOPHILIZED, FOR SOLUTION INTRAVENOUS EVERY 8 HOURS
Qty: 0 | Refills: 0 | Status: DISCONTINUED | OUTPATIENT
Start: 2018-08-08 | End: 2018-08-13

## 2018-08-08 RX ORDER — LIDOCAINE 4 G/100G
1 CREAM TOPICAL DAILY
Qty: 0 | Refills: 0 | Status: DISCONTINUED | OUTPATIENT
Start: 2018-08-08 | End: 2018-08-13

## 2018-08-08 RX ORDER — LACTOBACILLUS ACIDOPHILUS 100MM CELL
1 CAPSULE ORAL
Qty: 0 | Refills: 0 | Status: DISCONTINUED | OUTPATIENT
Start: 2018-08-08 | End: 2018-08-13

## 2018-08-08 RX ORDER — ZOLPIDEM TARTRATE 10 MG/1
5 TABLET ORAL AT BEDTIME
Qty: 0 | Refills: 0 | Status: DISCONTINUED | OUTPATIENT
Start: 2018-08-08 | End: 2018-08-13

## 2018-08-08 RX ORDER — TUBERCULIN PURIFIED PROTEIN DERIVATIVE 5 [IU]/.1ML
5 INJECTION, SOLUTION INTRADERMAL ONCE
Qty: 0 | Refills: 0 | Status: COMPLETED | OUTPATIENT
Start: 2018-08-08 | End: 2018-08-08

## 2018-08-08 RX ADMIN — LIDOCAINE 1 PATCH: 4 CREAM TOPICAL at 11:31

## 2018-08-08 RX ADMIN — Medication 1 PATCH: at 11:33

## 2018-08-08 RX ADMIN — Medication 1 TABLET(S): at 12:11

## 2018-08-08 RX ADMIN — LIDOCAINE 1 PATCH: 4 CREAM TOPICAL at 21:44

## 2018-08-08 RX ADMIN — Medication 3 MILLILITER(S): at 14:05

## 2018-08-08 RX ADMIN — OXYCODONE HYDROCHLORIDE 10 MILLIGRAM(S): 5 TABLET ORAL at 14:55

## 2018-08-08 RX ADMIN — CYCLOBENZAPRINE HYDROCHLORIDE 5 MILLIGRAM(S): 10 TABLET, FILM COATED ORAL at 05:07

## 2018-08-08 RX ADMIN — OXYCODONE HYDROCHLORIDE 10 MILLIGRAM(S): 5 TABLET ORAL at 21:44

## 2018-08-08 RX ADMIN — Medication 3 MILLILITER(S): at 08:57

## 2018-08-08 RX ADMIN — SODIUM CHLORIDE 10 MILLILITER(S): 9 INJECTION INTRAMUSCULAR; INTRAVENOUS; SUBCUTANEOUS at 13:30

## 2018-08-08 RX ADMIN — AZITHROMYCIN 250 MILLIGRAM(S): 500 TABLET, FILM COATED ORAL at 05:06

## 2018-08-08 RX ADMIN — OXYCODONE HYDROCHLORIDE 10 MILLIGRAM(S): 5 TABLET ORAL at 05:43

## 2018-08-08 RX ADMIN — OXYCODONE HYDROCHLORIDE 10 MILLIGRAM(S): 5 TABLET ORAL at 13:55

## 2018-08-08 RX ADMIN — Medication 3 MILLILITER(S): at 20:44

## 2018-08-08 RX ADMIN — OXYCODONE HYDROCHLORIDE 10 MILLIGRAM(S): 5 TABLET ORAL at 22:10

## 2018-08-08 RX ADMIN — Medication 100 MILLIGRAM(S): at 13:14

## 2018-08-08 RX ADMIN — PIPERACILLIN AND TAZOBACTAM 25 GRAM(S): 4; .5 INJECTION, POWDER, LYOPHILIZED, FOR SOLUTION INTRAVENOUS at 13:15

## 2018-08-08 RX ADMIN — OXYCODONE HYDROCHLORIDE 10 MILLIGRAM(S): 5 TABLET ORAL at 05:06

## 2018-08-08 RX ADMIN — PIPERACILLIN AND TAZOBACTAM 25 GRAM(S): 4; .5 INJECTION, POWDER, LYOPHILIZED, FOR SOLUTION INTRAVENOUS at 21:17

## 2018-08-08 RX ADMIN — GABAPENTIN 200 MILLIGRAM(S): 400 CAPSULE ORAL at 13:14

## 2018-08-08 RX ADMIN — GABAPENTIN 200 MILLIGRAM(S): 400 CAPSULE ORAL at 21:17

## 2018-08-08 RX ADMIN — ZOLPIDEM TARTRATE 5 MILLIGRAM(S): 10 TABLET ORAL at 21:44

## 2018-08-08 RX ADMIN — Medication 650 MILLIGRAM(S): at 21:17

## 2018-08-08 RX ADMIN — TUBERCULIN PURIFIED PROTEIN DERIVATIVE 5 UNIT(S): 5 INJECTION, SOLUTION INTRADERMAL at 17:48

## 2018-08-08 RX ADMIN — GABAPENTIN 200 MILLIGRAM(S): 400 CAPSULE ORAL at 05:07

## 2018-08-08 RX ADMIN — Medication 100 MILLIGRAM(S): at 21:17

## 2018-08-08 RX ADMIN — Medication 650 MILLIGRAM(S): at 07:38

## 2018-08-08 RX ADMIN — Medication 3 MILLIGRAM(S): at 21:17

## 2018-08-08 RX ADMIN — CEFTRIAXONE 100 GRAM(S): 500 INJECTION, POWDER, FOR SOLUTION INTRAMUSCULAR; INTRAVENOUS at 05:06

## 2018-08-08 NOTE — PROGRESS NOTE ADULT - PROBLEM SELECTOR PLAN 2
-Patient p/w left lower lobe cavitary lesion, will r/o TB, could be Staph aureus but -patient not p/w high fevers. ?Fungal cavitary lesion  -Follow up AFB's x 3, Quantiferon gold, Aspergillus serology, HIV   -SPutum neg 2/3  -Follow up CT chest  -Pt will need bronch prior to dc  -Pulm: Dr. Wagner

## 2018-08-08 NOTE — PROGRESS NOTE ADULT - SUBJECTIVE AND OBJECTIVE BOX
_________________________________________________________________________________________  ========>>  M E D I C A L   A T T E N D I N G    F O L L O W  U P  N O T E  <<=========  -----------------------------------------------------------------------------------------------------    - Patient seen and examined by me earlier today.   - In summary,  JOSE MAYER is a 52y year old woman who originally presented with cough, SOB  - Patient today overall doing ok, comfortable, eating OK.    Pt with more diarrhea today    ==================>> REVIEW OF SYSTEM <<=================    GEN: no fever, no chills, + multilevel pain including chronic back and some neck pain, not well controlled   RESP: no SOB at rest, ++ cough and some sputum, improved   CVS: no chest pain, no palpitations, no edema  GI: no abdominal pain, no nausea, ++ diarrhea again today  : no dysuria, no frequency, no hematuria  Neuro: no headache, no dizziness  Derm : no itching, no rash    ==================>> PHYSICAL EXAM <<=================    GEN: A&O X 3 , NAD , comfortable  HEENT: NCAT, PERRL, MMM, hearing intact  Neck: supple , no JVD  CVS: S1S2 , regular , No M/R/G appreciated  PULM: no significant wheezing or rhonchi noted   ABD.: soft. non tender, non distended,  bowel sounds present  Extrem: intact pulses , no edema   PSYCH : normal mood,  not anxious       ==================>> MEDICATIONS <<====================    ALBUTerol    90 MICROgram(s) HFA Inhaler 1 Puff(s) Inhalation every 4 hours  ALBUTerol/ipratropium for Nebulization 3 milliLiter(s) Nebulizer every 6 hours  azithromycin  IVPB      azithromycin  IVPB 500 milliGRAM(s) IV Intermittent every 24 hours  benzonatate 100 milliGRAM(s) Oral three times a day  gabapentin 200 milliGRAM(s) Oral every 8 hours  lactobacillus acidophilus 1 Tablet(s) Oral three times a day with meals  lidocaine   Patch 1 Patch Transdermal daily  melatonin 3 milliGRAM(s) Oral at bedtime  nicotine - 21 mG/24Hr(s) Patch 1 patch Transdermal daily  piperacillin/tazobactam IVPB. 3.375 Gram(s) IV Intermittent every 8 hours  tiotropium 18 MICROgram(s) Capsule 1 Capsule(s) Inhalation daily    MEDICATIONS  (PRN):  acetaminophen   Tablet 650 milliGRAM(s) Oral every 6 hours PRN For Temp greater than 38 C (100.4 F)  cyclobenzaprine 5 milliGRAM(s) Oral three times a day PRN Muscle Spasm  ketorolac   Injectable 15 milliGRAM(s) IV Push every 6 hours PRN Moderate Pain (4 - 6)  oxyCODONE    IR 10 milliGRAM(s) Oral every 6 hours PRN Severe Pain (7 - 10)  zolpidem 5 milliGRAM(s) Oral at bedtime PRN Insomnia    ==================>> VITAL SIGNS <<==================    Vital Signs Last 24 Hrs  T(C): 37.5 (08-08-18 @ 19:56)  T(F): 99.5 (08-08-18 @ 19:56), Max: 100.2 (08-08-18 @ 05:01)  HR: 105 (08-08-18 @ 19:56) (99 - 105)  BP: 136/67 (08-08-18 @ 19:56)  BP(mean): --  RR: 18 (08-08-18 @ 19:56) (18 - 18)  SpO2: 96% (08-08-18 @ 19:56) (96% - 98%)       ==================>> LAB AND IMAGING <<==================                        13.7   16.5  )-----------( 373      ( 08 Aug 2018 08:46 )             40.6     WBC count:   16.5 <<== ,  14.0 <<== ,  14.7 <<== ,  17.0 <<==     137  |  106  |  11  ----------------------------<  104<H>  4.0   |  20<L>  |  0.81    Ca    9.3      08 Aug 2018 08:46  Phos  3.2     08-08  Mg     2.3     08-08    _______________________  C U L T U R E S :    Culture - Acid Fast - Sputum w/Smear (collected 07 Aug 2018 17:23)  Source: .Sputum Sputum    Culture - Acid Fast - Sputum w/Smear (collected 06 Aug 2018 18:25)  Source: .Sputum Sputum    Culture - Acid Fast - Sputum w/Smear (collected 06 Aug 2018 18:10)  Source: .Sputum Sputum    Culture - Blood (collected 05 Aug 2018 22:00)  Source: .Blood Blood  Preliminary Report (06 Aug 2018 22:02):    No growth to date.    Culture - Blood (collected 05 Aug 2018 22:00)  Source: .Blood Blood  Preliminary Report (06 Aug 2018 22:02):    No growth to date.      ___________________________________________________________________________________  ===============>>  A S S E S S M E N T   A N D   P L A N <<===============  ------------------------------------------------------------------------------------------    · Assessment		  51yo female, from home, c/o productive whitish cough, weakness and vomiting since past Saturday.    Patient being admitted for pNA with cavitary lesion, concerns for TB vs Fungal infx.    Problem/Plan - 1:  ·  Problem: PNA with left lung infiltrate w/ cavitation  -Follow up -Sputum Culture, and AFB negative     Quanteferon indeterminant: to be repeated   -c/w Rocephin + Zithromax for now  -likely for bronch at later time   -ID and  Pulm appreciated.- BAcid given diarrhea       check Cdiff given leukocytosis     Problem/Plan - 2:  ·  Problem: chronic back pain   appreciated pain mgmt  continue meds as increased.    Problem/Plan - 3:  ·  Problem: HTN   - Continue Current medications as above  -Monitor BP and adjust medication if clinically indicated.  supplement hypokalemia and monitor     Problem/Plan - 4:  ·  Problem: Pre-diabetes  had a long discussion about diagnosis, educated about life style modification, diet, weight loss  no med needed at this time for discharge  monitor FS with RISS as above    -GI/DVT Prophylaxis.    --------------------------------------------  Case discussed with pt, HS  Education given on findings and plan of care  ___________________________  HJaz Burns D.O.  Pager: [phone]

## 2018-08-08 NOTE — PROGRESS NOTE ADULT - SUBJECTIVE AND OBJECTIVE BOX
Patient is a 52y old  Female who presents with a chief complaint of SOB (06 Aug 2018 00:16)    Awake, alert, comfortable in bed in NAD. Complaining of pain in neck and upper back. Has hx of smoking and has been told to have Copd in the past. Feeling better. Pt has no new complaints.    INTERVAL HPI/OVERNIGHT EVENTS:      VITAL SIGNS:  T(F): 97.4 (08-08-18 @ 08:30)  HR: 99 (08-08-18 @ 05:01)  BP: 139/72 (08-08-18 @ 05:01)  RR: 18 (08-08-18 @ 05:01)  SpO2: 98% (08-08-18 @ 05:01)  Wt(kg): --  I&O's Detail          REVIEW OF SYSTEMS:    CONSTITUTIONAL:  No fevers, chills, sweats    HEENT:  Eyes:  No diplopia or blurred vision. ENT:  No earache, sore throat or runny nose.    CARDIOVASCULAR:  No pressure, squeezing, tightness, or heaviness about the chest; no palpitations.    RESPIRATORY:  Per HPI    GASTROINTESTINAL:  No abdominal pain, nausea, vomiting or diarrhea.    GENITOURINARY:  No dysuria, frequency or urgency.    NEUROLOGIC:  No paresthesias, fasciculations, seizures or weakness.    PSYCHIATRIC:  No disorder of thought or mood.      PHYSICAL EXAM:    Constitutional: Well developed and nourished  Eyes:Perrla  ENMT: normal  Neck:supple  Respiratory: + Rales on left upper back  Cardiovascular: S1 S2 regular  Gastrointestinal: Soft, Non tender  Extremities: No edema  Vascular:normal  Neurological:Awake, alert,Ox3  Musculoskeletal:Normal      MEDICATIONS  (STANDING):  ALBUTerol    90 MICROgram(s) HFA Inhaler 1 Puff(s) Inhalation every 4 hours  ALBUTerol/ipratropium for Nebulization 3 milliLiter(s) Nebulizer every 6 hours  azithromycin  IVPB      azithromycin  IVPB 500 milliGRAM(s) IV Intermittent every 24 hours  benzonatate 100 milliGRAM(s) Oral three times a day  gabapentin 200 milliGRAM(s) Oral every 8 hours  lactobacillus acidophilus 1 Tablet(s) Oral daily  lidocaine   Patch 1 Patch Transdermal daily  melatonin 3 milliGRAM(s) Oral at bedtime  nicotine - 21 mG/24Hr(s) Patch 1 patch Transdermal daily  piperacillin/tazobactam IVPB. 3.375 Gram(s) IV Intermittent every 8 hours  sodium chloride 3%  Inhalation 10 milliLiter(s) Inhalation once  tiotropium 18 MICROgram(s) Capsule 1 Capsule(s) Inhalation daily    MEDICATIONS  (PRN):  acetaminophen   Tablet 650 milliGRAM(s) Oral every 6 hours PRN For Temp greater than 38 C (100.4 F)  cyclobenzaprine 5 milliGRAM(s) Oral three times a day PRN Muscle Spasm  ketorolac   Injectable 15 milliGRAM(s) IV Push every 6 hours PRN Moderate Pain (4 - 6)  oxyCODONE    IR 10 milliGRAM(s) Oral every 6 hours PRN Severe Pain (7 - 10)      Allergies    No Known Allergies    Intolerances        LABS:                        13.7   16.5  )-----------( 373      ( 08 Aug 2018 08:46 )             40.6     08-08    137  |  106  |  11  ----------------------------<  104<H>  4.0   |  20<L>  |  0.81    Ca    9.3      08 Aug 2018 08:46  Phos  3.2     08-08  Mg     2.3     08-08                CAPILLARY BLOOD GLUCOSE        pro-bnp 191 08-05 @ 16:33     d-dimer 574  08-05 @ 16:33      RADIOLOGY & ADDITIONAL TESTS:    CXR:  < from: Xray Chest 1 View- PORTABLE-Urgent (08.08.18 @ 10:36) >    IMPRESSION:  Similar left cavitation.    < end of copied text >    Ct scan chest:  < from: CT Angio Chest w/ IV Cont (08.06.18 @ 00:24) >  IMPRESSION:  No pulmonary embolism.    Left upper lobe cavitary mass versus cavitary pneumonia. Small   mediastinal and left hilar lymph nodes which could be reactive from   infection or metastatic if malignancy.    < end of copied text >    ekg;    echo: Patient is a 52y old  Female who presents with a chief complaint of SOB (06 Aug 2018 00:16)    Awake, alert, comfortable in bed in NAD.  Pain in neck and upper back improved. Has hx of smoking and has been told to have Copd in the past. Feeling better. Pt has no new complaints.  Sputum for AFB x 2 neg. Scheduled for Bronch on friday after TB is ruled out by sputum  INTERVAL HPI/OVERNIGHT EVENTS:      VITAL SIGNS:  T(F): 97.4 (08-08-18 @ 08:30)  HR: 99 (08-08-18 @ 05:01)  BP: 139/72 (08-08-18 @ 05:01)  RR: 18 (08-08-18 @ 05:01)  SpO2: 98% (08-08-18 @ 05:01)  Wt(kg): --  I&O's Detail          REVIEW OF SYSTEMS:    CONSTITUTIONAL:  No fevers, chills, sweats    HEENT:  Eyes:  No diplopia or blurred vision. ENT:  No earache, sore throat or runny nose.    CARDIOVASCULAR:  No pressure, squeezing, tightness, or heaviness about the chest; no palpitations.    RESPIRATORY:  Per HPI    GASTROINTESTINAL:  No abdominal pain, nausea, vomiting or diarrhea.    GENITOURINARY:  No dysuria, frequency or urgency.    NEUROLOGIC:  No paresthesias, fasciculations, seizures or weakness.    PSYCHIATRIC:  No disorder of thought or mood.      PHYSICAL EXAM:    Constitutional: Well developed and nourished  Eyes:Perrla  ENMT: normal  Neck:supple  Respiratory: + Rales on left upper back  Cardiovascular: S1 S2 regular  Gastrointestinal: Soft, Non tender  Extremities: No edema  Vascular:normal  Neurological:Awake, alert,Ox3  Musculoskeletal:Normal      MEDICATIONS  (STANDING):  ALBUTerol    90 MICROgram(s) HFA Inhaler 1 Puff(s) Inhalation every 4 hours  ALBUTerol/ipratropium for Nebulization 3 milliLiter(s) Nebulizer every 6 hours  azithromycin  IVPB      azithromycin  IVPB 500 milliGRAM(s) IV Intermittent every 24 hours  benzonatate 100 milliGRAM(s) Oral three times a day  gabapentin 200 milliGRAM(s) Oral every 8 hours  lactobacillus acidophilus 1 Tablet(s) Oral daily  lidocaine   Patch 1 Patch Transdermal daily  melatonin 3 milliGRAM(s) Oral at bedtime  nicotine - 21 mG/24Hr(s) Patch 1 patch Transdermal daily  piperacillin/tazobactam IVPB. 3.375 Gram(s) IV Intermittent every 8 hours  sodium chloride 3%  Inhalation 10 milliLiter(s) Inhalation once  tiotropium 18 MICROgram(s) Capsule 1 Capsule(s) Inhalation daily    MEDICATIONS  (PRN):  acetaminophen   Tablet 650 milliGRAM(s) Oral every 6 hours PRN For Temp greater than 38 C (100.4 F)  cyclobenzaprine 5 milliGRAM(s) Oral three times a day PRN Muscle Spasm  ketorolac   Injectable 15 milliGRAM(s) IV Push every 6 hours PRN Moderate Pain (4 - 6)  oxyCODONE    IR 10 milliGRAM(s) Oral every 6 hours PRN Severe Pain (7 - 10)      Allergies    No Known Allergies    Intolerances        LABS:                        13.7   16.5  )-----------( 373      ( 08 Aug 2018 08:46 )             40.6     08-08    137  |  106  |  11  ----------------------------<  104<H>  4.0   |  20<L>  |  0.81    Ca    9.3      08 Aug 2018 08:46  Phos  3.2     08-08  Mg     2.3     08-08                CAPILLARY BLOOD GLUCOSE        pro-bnp 191 08-05 @ 16:33     d-dimer 574  08-05 @ 16:33      RADIOLOGY & ADDITIONAL TESTS:    CXR:  < from: Xray Chest 1 View- PORTABLE-Urgent (08.08.18 @ 10:36) >    IMPRESSION:  Similar left cavitation.    < end of copied text >    Ct scan chest:  < from: CT Angio Chest w/ IV Cont (08.06.18 @ 00:24) >  IMPRESSION:  No pulmonary embolism.    Left upper lobe cavitary mass versus cavitary pneumonia. Small   mediastinal and left hilar lymph nodes which could be reactive from   infection or metastatic if malignancy.    < end of copied text >    ekg;    echo:

## 2018-08-08 NOTE — PROGRESS NOTE ADULT - SUBJECTIVE AND OBJECTIVE BOX
Meds:  ABX day # 4    azithromycin  IVPB 500 milliGRAM(s) IV Intermittent every 24 hours  Zosyn 3.375 gm IVpb q 8 hours    Allergies:  Allergies    No Known Allergies    Intolerances      ROS  [  ] UNABLE TO ELICIT    General:  [  ] None  [  ] Fever  [  ] Chills  [ x ] Malaise    Skin:  [ x ] None [  ] Rash  [  ] Wound  [  ] Ulcer    HEENT:  [ x ] None  [  ] Sore Throat  [  ] Nasal congestion/ runny nose  [  ] Photophobia [  ] Neck pain      Chest:  [  ] None   [  ] SOB  [ x ] Cough  [  ] None    Cardiovascular:   [ x ] None  [  ] CP  [  ] Palpitation    Gastrointestinal:  [  ] None  [  ] Abd pain   [ x ] Nausea    [  ] Vomiting   [  ] Diarrhea [ x ] Anorexia	     Genitourinary:  [ x ] None [  ] Polyuria   [  ] Urgency  [  ] Frequency  [  ] Dysuria    [  ]  Hematuria       Musculoskeletal:  [  ] None [  ] Back Pain	[  ] Body aches  [  ] Joint pain  [ x ] Weakness    Neurological: [  ] None [  ]Dizziness  [  ]Visual Disturbance  [  ]Headaches   [ x ] Weakness          PHYSICAL EXAM:  Vital Signs Last 24 Hrs  T(C): 36.3 (08 Aug 2018 08:30), Max: 37.9 (08 Aug 2018 05:01)  T(F): 97.4 (08 Aug 2018 08:30), Max: 100.2 (08 Aug 2018 05:01)  HR: 99 (08 Aug 2018 05:01) (82 - 99)  BP: 139/72 (08 Aug 2018 05:01) (114/69 - 139/72)  BP(mean): --  RR: 18 (08 Aug 2018 05:01) (17 - 18)  SpO2: 98% (08 Aug 2018 05:01) (97% - 100%)    Constitutional:    HEENT: [ x ] Wnl  [  ] Pharyngeal congestion    Neck:  [ x ] Supple  [  ]Lymphadenopathy  [ x ] No JVD   [  ] JVD  [  ] Masses   [  ] WNL    CHEST/Respiratory:  [ x ]Clear to auscultation  [  ] Rales   [  ] Rhonchi   [  ] Wheezing    [  ] Chest Tenderness      Cardiovascular:  [ x ] Reg S1 S2   [  ] Irreg S1 S2   [ x ]No Murmur  [  ] +ve Murmurs  [  ]Systolic [  ]Diastolic      Abdomen:  [ x ] Soft  [ x ] No tendrerness  [  ] Tenderness  [  ] Organomegaly  [  ] ABD Distention  [  ] Rigidity                       [ x ] No Regidity                       [ x ] No Rebound Tenderness  [  ] No Guarding Rigidity  [  ] Rebound Tenderness[  ] Guarding Rigidity                          [ x ]  +ve Bowel Sounds  [  ] Decreased Bowel Sounds    [  ] Absent Bowel Sounds                            Extremities: [ x ] No edema [  ] Edema  [  ] Clubbing   [  ] Cyanosis                         [ x ] No Tender Calf muscles  [  ] Tender Calf muscles                        [ x ] Palpable peripheral pulses    Neurological: [ x ] Awake  [ x ] Alert  [ x ] Oriented  x  3                           [  ] Confused  [  ] Drowsy  [  ] respond to painful stimuli  [  ] Unresponsive    Skin:  [ x ] Intact [  ] Redness [  ] Thrombophlebitis  [  ] Rashes  [  ] Dry  [  ] Ulcers    Ortho:  [  ] Joint Swelling  [  ] Joint erythema [  ] Joint tenderness                [  ] Increased temp. to touch  [  ] DJD [ x ] WNL          LABS/DIAGNOSTIC TESTS                        13.7   16.5  )-----------( 373      ( 08 Aug 2018 08:46 )             40.6   08-08    137  |  106  |  11  ----------------------------<  104<H>  4.0   |  20<L>  |  0.81    Ca    9.3      08 Aug 2018 08:46  Phos  3.2     08-08  Mg     2.3     08-08          Quantiferon TB Plus: INDETERMINATE Results are indeterminate  Sedimentation Rate, Erythrocyte: 88 mm/Hr (08.06.18 @ 19:03)  C-Reactive Protein, Serum: 18.60 mg/dL (08.06.18 @ 23:31)  Rapid RVP Result: [facility]  HIV-1/2 Antigen/Antibody Screen by [facility] (08.06.18 @ 11:18)    HIV-1/2 Combo Result: Nonreact      CULTURES:   Culture - Acid Fast - Sputum w/Smear . (08.07.18 @ 17:23)    Specimen Source: .Sputum Sputum    Acid Fast Bacilli Smear:   No acid fast bacilli seen by fluorochrome stain    Culture - Acid Fast - Sputum w/Smear . (08.06.18 @ 18:25)    Specimen Source: .Sputum Sputum    Acid Fast Bacilli Smear:   No acid fast bacilli seen by fluorochrome stain    Culture - Acid Fast - Sputum w/Smear . (08.06.18 @ 18:10)    Specimen Source: .Sputum Sputum    Acid Fast Bacilli Smear:   No acid fast bacilli seen by fluorochrome stain      Culture - Blood (08.05.18 @ 22:00)    Specimen Source: .Blood Blood    Culture Results:   No growth to date.    Culture - Blood (08.05.18 @ 22:00)    Specimen Source: .Blood Blood    Culture Results:   No growth to date.      RADIOLOGY      EXAM:  CT ANGIO CHEST (W)AW IC                            PROCEDURE DATE:  08/06/2018          INTERPRETATION:  HISTORY: Shortness of breath and elevated d-dimer.?   Cavitary mass on x-ray.    TECHNIQUE:  CT pulmonary angiography was performed of the chest according   to standard institutional protocol. Coronal, sagittal and transaxial 3-D   MIP reformatted images are provided from the transaxial source data.     68mL of Omnipaque 350 was administered without complication and 32 mL was   discarded.      COMPARISON: No similar prior study available for comparison.    FINDINGS:   There is good opacification of pulmonary arterial tree. No filling defect   is present to suggest acute pulmonary embolus.    The thyroid gland is unremarkable.     Evaluation of the lung parenchyma demonstrates a 8.3 x 5.3 x 5.4 cm   region of consolidation in the left upper lobe with associated   cavitation. There is an air-fluid level in the largest cavitary region.   There are adjacent nodular opacities to the inferior aspect of the   consolidation within the lingula of the left upper lobe. There is no   crossing of the procedure. The right lung is clear. There is no pleural   effusion or pneumothorax. The trachea and main bronchi are clear.    There are small nonspecific mediastinal and left hilar lymph nodes. The   largest mediastinal lymph node is a left precarinal node measuring 1.7   cm. Hilar lymph nodes measure up to 1.4 cm. There is no significant   supraclavicular, axillary or right hilar adenopathy.    The heart size is normal. There is no pericardial effusion. The thoracic   aorta is normal in caliber without dissection.     Limited images through the upper abdomen demonstrate no acute abnormality.    The visualized osseous structures are within normal limits.     IMPRESSION:  No pulmonary embolism.    Left upper lobe cavitary mass versus cavitary pneumonia. Small   mediastinal and left hilar lymph nodes which could be reactive from   infection or metastatic if malignancy.      EXAM:  XR CHEST PORTABLE IMMED 1V                            PROCEDURE DATE:  08/05/2018          INTERPRETATION:  Chest one view    HISTORY: Shortness of breath    COMPARISON STUDY: 4/28/2017    Frontal expiratory view of the chest shows the heartto be normal in   size. The lungs show left lower lobe superior segment infiltrate with   questionable cavitation within it and there is no evidence of   pneumothorax nor pleural effusion.    IMPRESSION:  Left lung infiltrate possibly with cavitation.         Assessment and Recommendation:   53yo female, from home, c/o productive whitish cough, weakness and vomiting since past Saturday.  Patient was admitted for pneumonia and cavitary lung mass, and was started on UV Rocephin, and Azithromycin.  8/7/18 Patient is afebrile and WBC is improving.  8/8/18 Patient continue to have fever and WBC increased, Rocephin was changed to Zosyn.    Problem/Recommendation - 1:  Problem: PNA (pneumonia).   Recommendation:   1- Continue IV Zosyn.  2- Continue Azithromycin.  3- O2 as needed.  4- Pulmonary management.  5- F/u CXR in one week.  6- Follow blood cultures to final reports.    Problem/Recommendation - 2:  ·  Problem: Cavitating mass of lower lobe of left lung.    Recommendation:   1- May discontinue Airborne isolation.  2- Sputum for AFB X 3 were -VE.  3- Repeat QuantiFeron gold test.  4- Sputum for cytology.  5- Sputum for bacterial and fungal culture.  6- ESR, and CRP in one week for follow up.    Problem/Recommendation - 3:  ·  Problem: Anemia.    Recommendation:   1- Closely monitor H & H.  2- Observe for bleeding.     Problem/Recommendation - 4:  ·  Problem: HTN (hypertension).    Recommendation:   1- Monitor Blood pressure closely.  2- Blood pressure control.  3- BP. meds as per cardiology and primary care team.     Discussed with medical resident.

## 2018-08-08 NOTE — PROGRESS NOTE ADULT - PROBLEM SELECTOR PLAN 1
-Patient p/w cough, fever and weakness for 1 week  -On admission: Leukocytosis, afebrile, lactate wnL, not septic  -CXR: left lung infiltrate w/ cavitation; RVP negative  -CT Chest: Cavitation to the Lt upper lobe  -Follow up -Sputum Culture, Aspergillus serology  -c/w Rocephin + Zithromax for now  -Will need bronch prior to dc.  Awaiting 3 neg sputum samples -2/3 neg  -f/u Quantiferon  -ID consult: Dr Lloyd  -Pulm: Dr. Wagner

## 2018-08-08 NOTE — PROGRESS NOTE ADULT - PROBLEM SELECTOR PLAN 1
Most likely secondary to pneumonia however malignancy is a possibility in view of hx of smoking  May need Bronchoscopy after TB is ruled out. Most likely secondary to pneumonia however malignancy is a possibility in view of hx of smoking  Will need Bronchoscopy after TB is ruled out.

## 2018-08-08 NOTE — PROGRESS NOTE ADULT - PROBLEM SELECTOR PLAN 3
Panculture  antibiotics  monitor temp and WBC  ID consult  Sputum for AFB x 3  Repiratory isolation  Quantiferon TB Gold test. Check pending cultures  antibiotics  monitor temp and WBC  ID consult  Sputum for AFB x 3  Repiratory isolation  Quantiferon TB Gold test.

## 2018-08-08 NOTE — PROGRESS NOTE ADULT - ASSESSMENT
51yo female, from home, c/o productive whitish cough, weakness and vomiting since past Saturday.    Patient being admitted for pNA with cavitary lesion, concerns for TB vs Fungal infx.

## 2018-08-08 NOTE — PROGRESS NOTE ADULT - SUBJECTIVE AND OBJECTIVE BOX
PGY 1 Note discussed with supervising resident and primary attending    Patient is a 52y old  Female who presents with a chief complaint of SOB (06 Aug 2018 00:16)    INTERVAL HPI/OVERNIGHT EVENTS: No acute events reported overnight.  Today pt presents in no acute distress.  Pt found resting comfortably in bed.  Pt reports neck discomfort improved with flexiril.  No new complaints reported today      MEDICATIONS  (STANDING):  ALBUTerol    90 MICROgram(s) HFA Inhaler 1 Puff(s) Inhalation every 4 hours  ALBUTerol/ipratropium for Nebulization 3 milliLiter(s) Nebulizer every 6 hours  azithromycin  IVPB      azithromycin  IVPB 500 milliGRAM(s) IV Intermittent every 24 hours  benzonatate 100 milliGRAM(s) Oral three times a day  cefTRIAXone   IVPB 1 Gram(s) IV Intermittent every 24 hours  cefTRIAXone   IVPB      gabapentin 200 milliGRAM(s) Oral every 8 hours  lactobacillus acidophilus 1 Tablet(s) Oral daily  lidocaine   Patch 1 Patch Transdermal daily  melatonin 3 milliGRAM(s) Oral at bedtime  nicotine - 21 mG/24Hr(s) Patch 1 patch Transdermal daily  tiotropium 18 MICROgram(s) Capsule 1 Capsule(s) Inhalation daily    MEDICATIONS  (PRN):  acetaminophen   Tablet 650 milliGRAM(s) Oral every 6 hours PRN For Temp greater than 38 C (100.4 F)  cyclobenzaprine 5 milliGRAM(s) Oral three times a day PRN Muscle Spasm  ketorolac   Injectable 15 milliGRAM(s) IV Push every 6 hours PRN Moderate Pain (4 - 6)  oxyCODONE    IR 10 milliGRAM(s) Oral every 6 hours PRN Severe Pain (7 - 10)          __________________________________________________  REVIEW OF SYSTEMS:    CONSTITUTIONAL: No fever,   EYES: no acute visual disturbances  NECK: No pain or stiffness  RESPIRATORY: No cough; No shortness of breath  CARDIOVASCULAR: No chest pain, no palpitations  GASTROINTESTINAL: No pain. No nausea or vomiting; No diarrhea   NEUROLOGICAL: No headache or numbness, no tremors  MUSCULOSKELETAL: No joint pain, no muscle pain     Vital Signs Last 24 Hrs  T(C): 37.9 (08 Aug 2018 05:01), Max: 37.9 (08 Aug 2018 05:01)  T(F): 100.2 (08 Aug 2018 05:01), Max: 100.2 (08 Aug 2018 05:01)  HR: 99 (08 Aug 2018 05:01) (82 - 99)  BP: 139/72 (08 Aug 2018 05:01) (114/69 - 139/72)  RR: 18 (08 Aug 2018 05:01) (17 - 18)  SpO2: 98% (08 Aug 2018 05:01) (97% - 100%)    ________________________________________________  PHYSICAL EXAM:  GENERAL: NAD  HEENT: Normocephalic;  conjunctivae and sclerae clear; moist mucous membranes;   NECK : supple; Pain with neck flexion   CHEST/LUNG: Ronchorus breath sound to HALIMA  HEART: S1 S2  regular; no murmurs, gallops or rubs  ABDOMEN: Soft, Nontender, Nondistended; Bowel sounds present  EXTREMITIES: no cyanosis; no edema; no calf tenderness  NERVOUS SYSTEM:  Awake and alert; Oriented  to place, person and time ; no new deficits    _________________________________________________  LABS:                            RADIOLOGY & ADDITIONAL TESTS:    Imaging Personally Reviewed:  YES    Consultant(s) Notes Reviewed:   YES    Care Discussed with Consultants :     Plan of care was discussed with patient and /or primary care giver; all questions and concerns were addressed and care was aligned with patient's wishes.

## 2018-08-09 DIAGNOSIS — R19.7 DIARRHEA, UNSPECIFIED: ICD-10-CM

## 2018-08-09 DIAGNOSIS — F32.9 MAJOR DEPRESSIVE DISORDER, SINGLE EPISODE, UNSPECIFIED: ICD-10-CM

## 2018-08-09 LAB
ANION GAP SERPL CALC-SCNC: 11 MMOL/L — SIGNIFICANT CHANGE UP (ref 5–17)
BUN SERPL-MCNC: 13 MG/DL — SIGNIFICANT CHANGE UP (ref 7–18)
C DIFF BY PCR RESULT: SIGNIFICANT CHANGE UP
C DIFF TOX GENS STL QL NAA+PROBE: SIGNIFICANT CHANGE UP
CALCIUM SERPL-MCNC: 9.2 MG/DL — SIGNIFICANT CHANGE UP (ref 8.4–10.5)
CHLORIDE SERPL-SCNC: 105 MMOL/L — SIGNIFICANT CHANGE UP (ref 96–108)
CO2 SERPL-SCNC: 21 MMOL/L — LOW (ref 22–31)
CREAT SERPL-MCNC: 0.82 MG/DL — SIGNIFICANT CHANGE UP (ref 0.5–1.3)
GLUCOSE SERPL-MCNC: 134 MG/DL — HIGH (ref 70–99)
GRAM STN FLD: SIGNIFICANT CHANGE UP
HCT VFR BLD CALC: 40.1 % — SIGNIFICANT CHANGE UP (ref 34.5–45)
HGB BLD-MCNC: 13.4 G/DL — SIGNIFICANT CHANGE UP (ref 11.5–15.5)
MAGNESIUM SERPL-MCNC: 2.4 MG/DL — SIGNIFICANT CHANGE UP (ref 1.6–2.6)
MCHC RBC-ENTMCNC: 30.8 PG — SIGNIFICANT CHANGE UP (ref 27–34)
MCHC RBC-ENTMCNC: 33.5 GM/DL — SIGNIFICANT CHANGE UP (ref 32–36)
MCV RBC AUTO: 91.8 FL — SIGNIFICANT CHANGE UP (ref 80–100)
PHOSPHATE SERPL-MCNC: 3.2 MG/DL — SIGNIFICANT CHANGE UP (ref 2.5–4.5)
PLATELET # BLD AUTO: 349 K/UL — SIGNIFICANT CHANGE UP (ref 150–400)
POTASSIUM SERPL-MCNC: 3.6 MMOL/L — SIGNIFICANT CHANGE UP (ref 3.5–5.3)
POTASSIUM SERPL-SCNC: 3.6 MMOL/L — SIGNIFICANT CHANGE UP (ref 3.5–5.3)
RBC # BLD: 4.37 M/UL — SIGNIFICANT CHANGE UP (ref 3.8–5.2)
RBC # FLD: 11.6 % — SIGNIFICANT CHANGE UP (ref 10.3–14.5)
SODIUM SERPL-SCNC: 137 MMOL/L — SIGNIFICANT CHANGE UP (ref 135–145)
SPECIMEN SOURCE: SIGNIFICANT CHANGE UP
T3 SERPL-MCNC: 105 NG/DL — SIGNIFICANT CHANGE UP (ref 80–200)
VIT D25+D1,25 OH+D1,25 PNL SERPL-MCNC: 51.3 PG/ML — SIGNIFICANT CHANGE UP (ref 19.9–79.3)
WBC # BLD: 14.2 K/UL — HIGH (ref 3.8–10.5)
WBC # FLD AUTO: 14.2 K/UL — HIGH (ref 3.8–10.5)

## 2018-08-09 PROCEDURE — 99233 SBSQ HOSP IP/OBS HIGH 50: CPT

## 2018-08-09 RX ORDER — ACETAMINOPHEN 500 MG
650 TABLET ORAL ONCE
Qty: 0 | Refills: 0 | Status: DISCONTINUED | OUTPATIENT
Start: 2018-08-09 | End: 2018-08-13

## 2018-08-09 RX ORDER — ACETAMINOPHEN 500 MG
1000 TABLET ORAL ONCE
Qty: 0 | Refills: 0 | Status: COMPLETED | OUTPATIENT
Start: 2018-08-09 | End: 2018-08-09

## 2018-08-09 RX ADMIN — CYCLOBENZAPRINE HYDROCHLORIDE 5 MILLIGRAM(S): 10 TABLET, FILM COATED ORAL at 13:04

## 2018-08-09 RX ADMIN — AZITHROMYCIN 250 MILLIGRAM(S): 500 TABLET, FILM COATED ORAL at 05:58

## 2018-08-09 RX ADMIN — LIDOCAINE 1 PATCH: 4 CREAM TOPICAL at 12:00

## 2018-08-09 RX ADMIN — Medication 3 MILLIGRAM(S): at 22:00

## 2018-08-09 RX ADMIN — Medication 1000 MILLIGRAM(S): at 11:00

## 2018-08-09 RX ADMIN — PIPERACILLIN AND TAZOBACTAM 25 GRAM(S): 4; .5 INJECTION, POWDER, LYOPHILIZED, FOR SOLUTION INTRAVENOUS at 05:58

## 2018-08-09 RX ADMIN — Medication 100 MILLIGRAM(S): at 05:58

## 2018-08-09 RX ADMIN — Medication 100 MILLIGRAM(S): at 22:00

## 2018-08-09 RX ADMIN — Medication 1 TABLET(S): at 10:45

## 2018-08-09 RX ADMIN — Medication 3 MILLILITER(S): at 16:39

## 2018-08-09 RX ADMIN — Medication 100 MILLIGRAM(S): at 13:11

## 2018-08-09 RX ADMIN — Medication 1 PATCH: at 10:53

## 2018-08-09 RX ADMIN — OXYCODONE HYDROCHLORIDE 10 MILLIGRAM(S): 5 TABLET ORAL at 13:19

## 2018-08-09 RX ADMIN — OXYCODONE HYDROCHLORIDE 10 MILLIGRAM(S): 5 TABLET ORAL at 19:41

## 2018-08-09 RX ADMIN — Medication 400 MILLIGRAM(S): at 10:45

## 2018-08-09 RX ADMIN — GABAPENTIN 200 MILLIGRAM(S): 400 CAPSULE ORAL at 13:04

## 2018-08-09 RX ADMIN — Medication 1 TABLET(S): at 17:50

## 2018-08-09 RX ADMIN — GABAPENTIN 200 MILLIGRAM(S): 400 CAPSULE ORAL at 22:00

## 2018-08-09 RX ADMIN — OXYCODONE HYDROCHLORIDE 10 MILLIGRAM(S): 5 TABLET ORAL at 14:20

## 2018-08-09 RX ADMIN — OXYCODONE HYDROCHLORIDE 10 MILLIGRAM(S): 5 TABLET ORAL at 22:00

## 2018-08-09 RX ADMIN — Medication 1 TABLET(S): at 13:04

## 2018-08-09 RX ADMIN — OXYCODONE HYDROCHLORIDE 10 MILLIGRAM(S): 5 TABLET ORAL at 06:21

## 2018-08-09 RX ADMIN — Medication 3 MILLILITER(S): at 10:05

## 2018-08-09 RX ADMIN — OXYCODONE HYDROCHLORIDE 10 MILLIGRAM(S): 5 TABLET ORAL at 06:05

## 2018-08-09 RX ADMIN — LIDOCAINE 1 PATCH: 4 CREAM TOPICAL at 13:03

## 2018-08-09 RX ADMIN — GABAPENTIN 200 MILLIGRAM(S): 400 CAPSULE ORAL at 05:58

## 2018-08-09 RX ADMIN — PIPERACILLIN AND TAZOBACTAM 25 GRAM(S): 4; .5 INJECTION, POWDER, LYOPHILIZED, FOR SOLUTION INTRAVENOUS at 21:59

## 2018-08-09 RX ADMIN — PIPERACILLIN AND TAZOBACTAM 25 GRAM(S): 4; .5 INJECTION, POWDER, LYOPHILIZED, FOR SOLUTION INTRAVENOUS at 13:07

## 2018-08-09 NOTE — PROGRESS NOTE ADULT - RS GEN PE MLT RESP DETAILS PC
clear to auscultation bilaterally/respirations non-labored/good air movement/breath sounds equal
respirations non-labored/good air movement/wheezes

## 2018-08-09 NOTE — PROGRESS NOTE ADULT - PROBLEM SELECTOR PLAN 1
- toradol 15 mg q 6 hrs prn for moderate pain   - tylenol 650 mg po q 6hrs prn for mild pain  - c/w flexeril 10 mg TID prn for muscle spasms  - gabapentin to 200mg q 8 hrs   - lidocaine patch daily   - OOB, ambulate as tolerated.

## 2018-08-09 NOTE — PROGRESS NOTE ADULT - PROBLEM SELECTOR PLAN 1
-Patient p/w cough, fever and weakness for 1 week  -On admission: Leukocytosis, afebrile, lactate wnL, not septic  -CXR: left lung infiltrate w/ cavitation; RVP negative  -CT Chest: Cavitation to the Lt upper lobe  -Follow up -Aspergillus serology  -c/w Rocephin + Zithromax for now  -3 neg AFB sputum samples -3/3 neg  -f/u repeat Quantiferon  -ID consult: Dr Lloyd  -Pulm: Dr. Wagner

## 2018-08-09 NOTE — PROGRESS NOTE ADULT - SUBJECTIVE AND OBJECTIVE BOX
Meds:  ABX day # 4    azithromycin  IVPB 500 milliGRAM(s) IV Intermittent every 24 hours  Zosyn 3.375 gm IVpb q 8 hours    Allergies:  Allergies    No Known Allergies    Intolerances      ROS  [  ] UNABLE TO ELICIT    General:  [  ] None  [  ] Fever  [  ] Chills  [ x ] Malaise    Skin:  [ x ] None [  ] Rash  [  ] Wound  [  ] Ulcer    HEENT:  [ x ] None  [  ] Sore Throat  [  ] Nasal congestion/ runny nose  [  ] Photophobia [  ] Neck pain      Chest:  [  ] None   [  ] SOB  [ x ] Cough  [  ] None    Cardiovascular:   [ x ] None  [  ] CP  [  ] Palpitation    Gastrointestinal:  [  ] None  [  ] Abd pain   [ x ] Nausea    [  ] Vomiting   [  ] Diarrhea [ x ] Anorexia	     Genitourinary:  [ x ] None [  ] Polyuria   [  ] Urgency  [  ] Frequency  [  ] Dysuria    [  ]  Hematuria       Musculoskeletal:  [  ] None [  ] Back Pain	[  ] Body aches  [  ] Joint pain  [ x ] Weakness    Neurological: [  ] None [  ]Dizziness  [  ]Visual Disturbance  [  ]Headaches   [ x ] Weakness          PHYSICAL EXAM:  Vital Signs Last 24 Hrs  T(C): 37.2 (09 Aug 2018 05:30), Max: 37.5 (08 Aug 2018 19:56)  T(F): 98.9 (09 Aug 2018 05:30), Max: 99.5 (08 Aug 2018 19:56)  HR: 85 (09 Aug 2018 05:30) (85 - 105)  BP: 119/64 (09 Aug 2018 05:30) (119/64 - 136/67)  BP(mean): --  RR: 18 (09 Aug 2018 05:30) (18 - 18)  SpO2: 96% (09 Aug 2018 05:30) (96% - 98%)    Constitutional:    HEENT: [ x ] Wnl  [  ] Pharyngeal congestion    Neck:  [ x ] Supple  [  ]Lymphadenopathy  [ x ] No JVD   [  ] JVD  [  ] Masses   [  ] WNL    CHEST/Respiratory:  [ x ]Clear to auscultation  [  ] Rales   [  ] Rhonchi   [  ] Wheezing    [  ] Chest Tenderness      Cardiovascular:  [ x ] Reg S1 S2   [  ] Irreg S1 S2   [ x ]No Murmur  [  ] +ve Murmurs  [  ]Systolic [  ]Diastolic      Abdomen:  [ x ] Soft  [ x ] No tendrerness  [  ] Tenderness  [  ] Organomegaly  [  ] ABD Distention  [  ] Rigidity                       [ x ] No Regidity                       [ x ] No Rebound Tenderness  [  ] No Guarding Rigidity  [  ] Rebound Tenderness[  ] Guarding Rigidity                          [ x ]  +ve Bowel Sounds  [  ] Decreased Bowel Sounds    [  ] Absent Bowel Sounds                            Extremities: [ x ] No edema [  ] Edema  [  ] Clubbing   [  ] Cyanosis                         [ x ] No Tender Calf muscles  [  ] Tender Calf muscles                        [ x ] Palpable peripheral pulses    Neurological: [ x ] Awake  [ x ] Alert  [ x ] Oriented  x  3                           [  ] Confused  [  ] Drowsy  [  ] respond to painful stimuli  [  ] Unresponsive    Skin:  [ x ] Intact [  ] Redness [  ] Thrombophlebitis  [  ] Rashes  [  ] Dry  [  ] Ulcers    Ortho:  [  ] Joint Swelling  [  ] Joint erythema [  ] Joint tenderness                [  ] Increased temp. to touch  [  ] DJD [ x ] WNL          LABS/DIAGNOSTIC TESTS                        13.4   14.2  )-----------( 349      ( 09 Aug 2018 07:33 )             40.1   08-09    137  |  105  |  13  ----------------------------<  134<H>  3.6   |  21<L>  |  0.82    Ca    9.2      09 Aug 2018 07:33  Phos  3.2     08-09  Mg     2.4     08-09            Quantiferon TB Plus: INDETERMINATE Results are indeterminate  Sedimentation Rate, Erythrocyte: 88 mm/Hr (08.06.18 @ 19:03)  C-Reactive Protein, Serum: 18.60 mg/dL (08.06.18 @ 23:31)  Rapid RVP Result: [facility]  HIV-1/2 Antigen/Antibody Screen by [facility] (08.06.18 @ 11:18)    HIV-1/2 Combo Result: Nonreact      CULTURES:   Culture - Sputum . (08.09.18 @ 00:33)    Gram Stain:   Few Squamous epithelial cells per low power field  Few polymorphonuclear leukocytes per low power field  Numerous Gram variable coccobacilli per oil power field    Specimen Source: .Sputum Sputum      Culture - Acid Fast - Sputum w/Smear . (08.07.18 @ 17:23)    Specimen Source: .Sputum Sputum    Acid Fast Bacilli Smear:   No acid fast bacilli seen by fluorochrome stain    Culture - Acid Fast - Sputum w/Smear . (08.06.18 @ 18:25)    Specimen Source: .Sputum Sputum    Acid Fast Bacilli Smear:   No acid fast bacilli seen by fluorochrome stain    Culture - Acid Fast - Sputum w/Smear . (08.06.18 @ 18:10)    Specimen Source: .Sputum Sputum    Acid Fast Bacilli Smear:   No acid fast bacilli seen by fluorochrome stain      Culture - Blood (08.05.18 @ 22:00)    Specimen Source: .Blood Blood    Culture Results:   No growth to date.    Culture - Blood (08.05.18 @ 22:00)    Specimen Source: .Blood Blood    Culture Results:   No growth to date.      RADIOLOGY      EXAM:  CT ANGIO CHEST (W)AW IC                            PROCEDURE DATE:  08/06/2018          INTERPRETATION:  HISTORY: Shortness of breath and elevated d-dimer.?   Cavitary mass on x-ray.    TECHNIQUE:  CT pulmonary angiography was performed of the chest according   to standard institutional protocol. Coronal, sagittal and transaxial 3-D   MIP reformatted images are provided from the transaxial source data.     68mL of Omnipaque 350 was administered without complication and 32 mL was   discarded.      COMPARISON: No similar prior study available for comparison.    FINDINGS:   There is good opacification of pulmonary arterial tree. No filling defect   is present to suggest acute pulmonary embolus.    The thyroid gland is unremarkable.     Evaluation of the lung parenchyma demonstrates a 8.3 x 5.3 x 5.4 cm   region of consolidation in the left upper lobe with associated   cavitation. There is an air-fluid level in the largest cavitary region.   There are adjacent nodular opacities to the inferior aspect of the   consolidation within the lingula of the left upper lobe. There is no   crossing of the procedure. The right lung is clear. There is no pleural   effusion or pneumothorax. The trachea and main bronchi are clear.    There are small nonspecific mediastinal and left hilar lymph nodes. The   largest mediastinal lymph node is a left precarinal node measuring 1.7   cm. Hilar lymph nodes measure up to 1.4 cm. There is no significant   supraclavicular, axillary or right hilar adenopathy.    The heart size is normal. There is no pericardial effusion. The thoracic   aorta is normal in caliber without dissection.     Limited images through the upper abdomen demonstrate no acute abnormality.    The visualized osseous structures are within normal limits.     IMPRESSION:  No pulmonary embolism.    Left upper lobe cavitary mass versus cavitary pneumonia. Small   mediastinal and left hilar lymph nodes which could be reactive from   infection or metastatic if malignancy.      EXAM:  XR CHEST PORTABLE IMMED 1V                            PROCEDURE DATE:  08/05/2018          INTERPRETATION:  Chest one view    HISTORY: Shortness of breath    COMPARISON STUDY: 4/28/2017    Frontal expiratory view of the chest shows the heartto be normal in   size. The lungs show left lower lobe superior segment infiltrate with   questionable cavitation within it and there is no evidence of   pneumothorax nor pleural effusion.    IMPRESSION:  Left lung infiltrate possibly with cavitation.         Assessment and Recommendation:   51yo female, from home, c/o productive whitish cough, weakness and vomiting since past Saturday.  Patient was admitted for pneumonia and cavitary lung mass, and was started on UV Rocephin, and Azithromycin.  8/7/18 Patient is afebrile and WBC is improving.  8/8/18 Patient continue to have fever and WBC increased, Rocephin was changed to Zosyn.  8/9/18 WBC is improving, and patient is afebrile.    Problem/Recommendation - 1:  Problem: PNA (pneumonia).   Recommendation:   1- Continue IV Zosyn.  2- Continue Azithromycin.  3- O2 as needed.  4- Pulmonary management, and bronchoscopy as planned.  5- F/u CXR in one week from last CXR.  6- Follow blood cultures to final reports.    Problem/Recommendation - 2:  ·  Problem: Cavitating mass of lower lobe of left lung.    Recommendation:   1- May discontinue Airborne isolation.  2- Sputum for AFB X 3 were -VE.  3- Repeat QuantiFeron gold test.  4- Sputum for cytology.  5- Sputum for bacterial and fungal culture is noted.  6- ESR, and CRP in one week for follow up.    Problem/Recommendation - 3:  ·  Problem: Anemia.    Recommendation:   1- Closely monitor H & H.  2- Observe for bleeding.     Problem/Recommendation - 4:  ·  Problem: HTN (hypertension).    Recommendation:   1- Monitor Blood pressure closely.  2- Blood pressure control.  3- BP. meds as per cardiology and primary care team.     Discussed with medical resident.

## 2018-08-09 NOTE — PROGRESS NOTE ADULT - NEUROLOGICAL DETAILS
superficial reflexes intact/alert and oriented x 3/sensation intact
alert and oriented x 3/sensation intact/normal strength

## 2018-08-09 NOTE — PROGRESS NOTE ADULT - PROBLEM SELECTOR PLAN 3
Check pending cultures  antibiotics  monitor temp and WBC  ID consult  Sputum for AFB x 3  Repiratory isolation  Quantiferon TB Gold test. Follow up CXR  antibiotics  monitor temp and WBC  ID consult  Sputum for AFB x 3  Repiratory isolation  Quantiferon TB Gold test.

## 2018-08-09 NOTE — PROGRESS NOTE ADULT - NEGATIVE NEUROLOGICAL SYMPTOMS
no loss of sensation/no transient paralysis/no paresthesias/no difficulty walking
no loss of sensation/no tremors

## 2018-08-09 NOTE — PROGRESS NOTE ADULT - PROBLEM SELECTOR PLAN 2
-Patient p/w left lower lobe cavitary lesion, will r/o TB, could be Staph aureus but -patient not p/w high fevers. ?Fungal cavitary lesion  -3 neg AFB sputum, Quantiferon indeterminate   -Aspergillus serology, HIV   -SPutum neg 2/3  -Bronch in AM  -Pulm: Dr. Wagner

## 2018-08-09 NOTE — PROGRESS NOTE ADULT - MUSCULOSKELETAL
details… detailed exam no calf tenderness/no joint swelling/normal strength/no joint erythema/normal/ROM intact

## 2018-08-09 NOTE — PROGRESS NOTE ADULT - SUBJECTIVE AND OBJECTIVE BOX
PGY 1 Note discussed with supervising resident and primary attending    Patient is a 52y old  Female who presents with a chief complaint of SOB (06 Aug 2018 00:16)    INTERVAL HPI/OVERNIGHT EVENTS: No acute events reported overnight.  Today pt presents in no acute distress.  Pt found resting comfortably in bed.    No new complaints reported today      MEDICATIONS  (STANDING):  acetaminophen  IVPB. 1000 milliGRAM(s) IV Intermittent once  ALBUTerol    90 MICROgram(s) HFA Inhaler 1 Puff(s) Inhalation every 4 hours  ALBUTerol/ipratropium for Nebulization 3 milliLiter(s) Nebulizer every 6 hours  azithromycin  IVPB      azithromycin  IVPB 500 milliGRAM(s) IV Intermittent every 24 hours  benzonatate 100 milliGRAM(s) Oral three times a day  gabapentin 200 milliGRAM(s) Oral every 8 hours  lactobacillus acidophilus 1 Tablet(s) Oral three times a day with meals  lidocaine   Patch 1 Patch Transdermal daily  melatonin 3 milliGRAM(s) Oral at bedtime  nicotine - 21 mG/24Hr(s) Patch 1 patch Transdermal daily  piperacillin/tazobactam IVPB. 3.375 Gram(s) IV Intermittent every 8 hours  tiotropium 18 MICROgram(s) Capsule 1 Capsule(s) Inhalation daily    MEDICATIONS  (PRN):  acetaminophen   Tablet 650 milliGRAM(s) Oral every 6 hours PRN For Temp greater than 38 C (100.4 F)  acetaminophen   Tablet. 650 milliGRAM(s) Oral once PRN pain  cyclobenzaprine 5 milliGRAM(s) Oral three times a day PRN Muscle Spasm  ketorolac   Injectable 15 milliGRAM(s) IV Push every 6 hours PRN Moderate Pain (4 - 6)  oxyCODONE    IR 10 milliGRAM(s) Oral every 6 hours PRN Severe Pain (7 - 10)  zolpidem 5 milliGRAM(s) Oral at bedtime PRN Insomnia    __________________________________________________  REVIEW OF SYSTEMS:    CONSTITUTIONAL: No fever,   EYES: no acute visual disturbances  NECK: No pain or stiffness  RESPIRATORY: No cough; No shortness of breath  CARDIOVASCULAR: No chest pain, no palpitations  GASTROINTESTINAL: No pain. No nausea or vomiting; No diarrhea   NEUROLOGICAL: No headache or numbness, no tremors  MUSCULOSKELETAL: No joint pain, no muscle pain     Vital Signs Last 24 Hrs  T(C): 37.2 (09 Aug 2018 05:30), Max: 37.5 (08 Aug 2018 19:56)  T(F): 98.9 (09 Aug 2018 05:30), Max: 99.5 (08 Aug 2018 19:56)  HR: 85 (09 Aug 2018 05:30) (85 - 105)  BP: 119/64 (09 Aug 2018 05:30) (119/64 - 136/67)  RR: 18 (09 Aug 2018 05:30) (18 - 18)  SpO2: 96% (09 Aug 2018 05:30) (96% - 98%)  ________________________________________________  PHYSICAL EXAM:  GENERAL: NAD  HEENT: Normocephalic;  conjunctivae and sclerae clear; moist mucous membranes;   NECK : supple; Pain with neck flexion   CHEST/LUNG: Ronchorus breath sound to HALIMA  HEART: S1 S2  regular; no murmurs, gallops or rubs  ABDOMEN: Soft, Nontender, Nondistended; Bowel sounds present  EXTREMITIES: no cyanosis; no edema; no calf tenderness  NERVOUS SYSTEM:  Awake and alert; Oriented  to place, person and time ; no new deficits    _________________________________________________  LABS:                                      13.4   14.2  )-----------( 349      ( 09 Aug 2018 07:33 )             40.1     08-09    137  |  105  |  13  ----------------------------<  134<H>  3.6   |  21<L>  |  0.82    Ca    9.2      09 Aug 2018 07:33  Phos  3.2     08-09  Mg     2.4     08-09                RADIOLOGY & ADDITIONAL TESTS:    Imaging Personally Reviewed:  YES    Consultant(s) Notes Reviewed:   YES    Care Discussed with Consultants :     Plan of care was discussed with patient and /or primary care giver; all questions and concerns were addressed and care was aligned with patient's wishes.

## 2018-08-09 NOTE — BEHAVIORAL HEALTH ASSESSMENT NOTE - SUMMARY
This is a 51 y/o disabled, female with h/o depression/anxiety was admitted with PNA.  Patient was interviewed,chart was revieqwed,case was discussed with MD.  Patient is c/o feeling depressed,rondon,poor night sleep.  She stated:"I have Autistic son who is giving me a lot of stress,does not take his meds".  Patient was under psych Tx in 2014 and she was Rx Wellbutrin and Xanax.  Patient stated taking 4 Tylenol PM to go to sleep at home.  Denied h/o inpatient psych Tx.  Denied h/o suicidal attempt.Denied h/o shauna or psychosis.  Denied using alcohol./  Admitted using marijuana x 2  a month  Patient is a./o x3 ,sad looking,tearfull,verbal,behaviorally controlled.  Speech is goal directed.Denied suicidal/homocidal thought,ideaTION,PLAN.  dENIED A/V HALLUCINATIONS.MEMORY AND COGNITIon-fair.I&J-fair.Poor appetite.poor night sleep.

## 2018-08-09 NOTE — PROGRESS NOTE ADULT - PROBLEM SELECTOR PLAN 1
Most likely secondary to pneumonia however malignancy is a possibility in view of hx of smoking  Will need Bronchoscopy after TB is ruled out.

## 2018-08-09 NOTE — PROGRESS NOTE ADULT - SUBJECTIVE AND OBJECTIVE BOX
Patient is a 52y old  Female who presents with a chief complaint of SOB (06 Aug 2018 00:16)    Awake, alert, comfortable in bed in NAD.  Pain in neck and upper back improved. Has hx of smoking and has been told to have Copd in the past. Feeling better. Sputum for AFB x 2 neg. Scheduled for Bronch tomorrow after TB is ruled out by sputum. Pt has no new complaints.    INTERVAL HPI/OVERNIGHT EVENTS:      VITAL SIGNS:  T(F): 98.9 (08-09-18 @ 05:30)  HR: 85 (08-09-18 @ 05:30)  BP: 119/64 (08-09-18 @ 05:30)  RR: 18 (08-09-18 @ 05:30)  SpO2: 96% (08-09-18 @ 05:30)  Wt(kg): --  I&O's Detail          REVIEW OF SYSTEMS:    CONSTITUTIONAL:  No fevers, chills, sweats    HEENT:  Eyes:  No diplopia or blurred vision. ENT:  No earache, sore throat or runny nose.    CARDIOVASCULAR:  No pressure, squeezing, tightness, or heaviness about the chest; no palpitations.    RESPIRATORY:  Per HPI    GASTROINTESTINAL:  No abdominal pain, nausea, vomiting or diarrhea.    GENITOURINARY:  No dysuria, frequency or urgency.    NEUROLOGIC:  No paresthesias, fasciculations, seizures or weakness.    PSYCHIATRIC:  No disorder of thought or mood.      PHYSICAL EXAM:    Constitutional: Well developed and nourished  Eyes:Perrla  ENMT: normal  Neck:supple  Respiratory:+ Rales on left upper back  Cardiovascular: S1 S2 regular  Gastrointestinal: Soft, Non tender  Extremities: No edema  Vascular:normal  Neurological:Awake, alert,Ox3  Musculoskeletal:Normal      MEDICATIONS  (STANDING):  ALBUTerol    90 MICROgram(s) HFA Inhaler 1 Puff(s) Inhalation every 4 hours  ALBUTerol/ipratropium for Nebulization 3 milliLiter(s) Nebulizer every 6 hours  azithromycin  IVPB      azithromycin  IVPB 500 milliGRAM(s) IV Intermittent every 24 hours  benzonatate 100 milliGRAM(s) Oral three times a day  gabapentin 200 milliGRAM(s) Oral every 8 hours  lactobacillus acidophilus 1 Tablet(s) Oral three times a day with meals  lidocaine   Patch 1 Patch Transdermal daily  melatonin 3 milliGRAM(s) Oral at bedtime  nicotine - 21 mG/24Hr(s) Patch 1 patch Transdermal daily  piperacillin/tazobactam IVPB. 3.375 Gram(s) IV Intermittent every 8 hours  tiotropium 18 MICROgram(s) Capsule 1 Capsule(s) Inhalation daily    MEDICATIONS  (PRN):  acetaminophen   Tablet 650 milliGRAM(s) Oral every 6 hours PRN For Temp greater than 38 C (100.4 F)  acetaminophen   Tablet. 650 milliGRAM(s) Oral once PRN pain  cyclobenzaprine 5 milliGRAM(s) Oral three times a day PRN Muscle Spasm  ketorolac   Injectable 15 milliGRAM(s) IV Push every 6 hours PRN Moderate Pain (4 - 6)  oxyCODONE    IR 10 milliGRAM(s) Oral every 6 hours PRN Severe Pain (7 - 10)  zolpidem 5 milliGRAM(s) Oral at bedtime PRN Insomnia      Allergies    No Known Allergies    Intolerances        LABS:                        13.4   14.2  )-----------( 349      ( 09 Aug 2018 07:33 )             40.1     08-09    137  |  105  |  13  ----------------------------<  134<H>  3.6   |  21<L>  |  0.82    Ca    9.2      09 Aug 2018 07:33  Phos  3.2     08-09  Mg     2.4     08-09                CAPILLARY BLOOD GLUCOSE        pro-bnp 191 08-05 @ 16:33     d-dimer 574  08-05 @ 16:33      RADIOLOGY & ADDITIONAL TESTS:    CXR:  < from: Xray Chest 1 View- PORTABLE-Urgent (08.08.18 @ 10:36) >  IMPRESSION:  Similar left cavitation.    < end of copied text >    Ct scan chest:  < from: CT Angio Chest w/ IV Cont (08.06.18 @ 00:24) >  IMPRESSION:  No pulmonary embolism.    Left upper lobe cavitary mass versus cavitary pneumonia. Small   mediastinal and left hilar lymph nodes which could be reactive from   infection or metastatic if malignancy.    < end of copied text >    ekg;  < from: 12 Lead ECG (08.06.18 @ 20:44) >    Ventricular Rate 74 BPM    Atrial Rate 74 BPM    P-R Interval 136 ms    QRS Duration 74 ms     ms    QTc 452 ms    P Axis 57 degrees    R Axis 24 degrees    T Axis 34 degrees    Diagnosis Line Normal sinus rhythm  Normal ECG  Confirmed by BASIM RAMÍREZ, HARLEY ([id number]) on 08-Aug-2018 17:20:07    < end of copied text >    echo: Patient is a 52y old  Female who presents with a chief complaint of SOB (06 Aug 2018 00:16)    Awake, alert, comfortable in bed in NAD.  Pain in neck and upper back improved. Has hx of smoking and has been told to have Copd in the past. Feeling better. Sputum for AFB x 3 neg. Scheduled for Bronch tomorrow after TB was ruled out by sputum. Pt has no new complaints. Being evaluated by Psych because of depression.    INTERVAL HPI/OVERNIGHT EVENTS:      VITAL SIGNS:  T(F): 98.9 (08-09-18 @ 05:30)  HR: 85 (08-09-18 @ 05:30)  BP: 119/64 (08-09-18 @ 05:30)  RR: 18 (08-09-18 @ 05:30)  SpO2: 96% (08-09-18 @ 05:30)  Wt(kg): --  I&O's Detail          REVIEW OF SYSTEMS:    CONSTITUTIONAL:  No fevers, chills, sweats    HEENT:  Eyes:  No diplopia or blurred vision. ENT:  No earache, sore throat or runny nose.    CARDIOVASCULAR:  No pressure, squeezing, tightness, or heaviness about the chest; no palpitations.    RESPIRATORY:  Per HPI    GASTROINTESTINAL:  No abdominal pain, nausea, vomiting or diarrhea.    GENITOURINARY:  No dysuria, frequency or urgency.    NEUROLOGIC:  No paresthesias, fasciculations, seizures or weakness.    PSYCHIATRIC:  No disorder of thought or mood.      PHYSICAL EXAM:    Constitutional: Well developed and nourished  Eyes:Perrla  ENMT: normal  Neck:supple  Respiratory:+ Rales on left upper back  Cardiovascular: S1 S2 regular  Gastrointestinal: Soft, Non tender  Extremities: No edema  Vascular:normal  Neurological:Awake, alert,Ox3  Musculoskeletal:Normal      MEDICATIONS  (STANDING):  ALBUTerol    90 MICROgram(s) HFA Inhaler 1 Puff(s) Inhalation every 4 hours  ALBUTerol/ipratropium for Nebulization 3 milliLiter(s) Nebulizer every 6 hours  azithromycin  IVPB      azithromycin  IVPB 500 milliGRAM(s) IV Intermittent every 24 hours  benzonatate 100 milliGRAM(s) Oral three times a day  gabapentin 200 milliGRAM(s) Oral every 8 hours  lactobacillus acidophilus 1 Tablet(s) Oral three times a day with meals  lidocaine   Patch 1 Patch Transdermal daily  melatonin 3 milliGRAM(s) Oral at bedtime  nicotine - 21 mG/24Hr(s) Patch 1 patch Transdermal daily  piperacillin/tazobactam IVPB. 3.375 Gram(s) IV Intermittent every 8 hours  tiotropium 18 MICROgram(s) Capsule 1 Capsule(s) Inhalation daily    MEDICATIONS  (PRN):  acetaminophen   Tablet 650 milliGRAM(s) Oral every 6 hours PRN For Temp greater than 38 C (100.4 F)  acetaminophen   Tablet. 650 milliGRAM(s) Oral once PRN pain  cyclobenzaprine 5 milliGRAM(s) Oral three times a day PRN Muscle Spasm  ketorolac   Injectable 15 milliGRAM(s) IV Push every 6 hours PRN Moderate Pain (4 - 6)  oxyCODONE    IR 10 milliGRAM(s) Oral every 6 hours PRN Severe Pain (7 - 10)  zolpidem 5 milliGRAM(s) Oral at bedtime PRN Insomnia      Allergies    No Known Allergies    Intolerances        LABS:                        13.4   14.2  )-----------( 349      ( 09 Aug 2018 07:33 )             40.1     08-09    137  |  105  |  13  ----------------------------<  134<H>  3.6   |  21<L>  |  0.82    Ca    9.2      09 Aug 2018 07:33  Phos  3.2     08-09  Mg     2.4     08-09                CAPILLARY BLOOD GLUCOSE        pro-bnp 191 08-05 @ 16:33     d-dimer 574  08-05 @ 16:33      RADIOLOGY & ADDITIONAL TESTS:    CXR:  < from: Xray Chest 1 View- PORTABLE-Urgent (08.08.18 @ 10:36) >  IMPRESSION:  Similar left cavitation.    < end of copied text >    Ct scan chest:  < from: CT Angio Chest w/ IV Cont (08.06.18 @ 00:24) >  IMPRESSION:  No pulmonary embolism.    Left upper lobe cavitary mass versus cavitary pneumonia. Small   mediastinal and left hilar lymph nodes which could be reactive from   infection or metastatic if malignancy.    < end of copied text >    ekg;  < from: 12 Lead ECG (08.06.18 @ 20:44) >    Ventricular Rate 74 BPM    Atrial Rate 74 BPM    P-R Interval 136 ms    QRS Duration 74 ms     ms    QTc 452 ms    P Axis 57 degrees    R Axis 24 degrees    T Axis 34 degrees    Diagnosis Line Normal sinus rhythm  Normal ECG  Confirmed by BASIM RAMÍREZ, HARLEY ([id number]) on 08-Aug-2018 17:20:07    < end of copied text >    echo:

## 2018-08-09 NOTE — PROGRESS NOTE ADULT - SUBJECTIVE AND OBJECTIVE BOX
_________________________________________________________________________________________  ========>>  M E D I C A L   A T T E N D I N G    F O L L O W  U P  N O T E  <<=========  -----------------------------------------------------------------------------------------------------    - Patient seen and examined by me earlier today.   - In summary,  JOSE MAYER is a 52y year old woman who originally presented with cough, SOB  - Patient today overall doing ok, comfortable, eating OK.    Pt with no more diarrhea today    ==================>> REVIEW OF SYSTEM <<=================    GEN: no fever, no chills, + multilevel pain including chronic back and some neck pain, stable  RESP: no SOB at rest,  cough and some sputum, improved   CVS: no chest pain, no palpitations, no edema  GI: no abdominal pain, no nausea, no diarrhea   : no dysuria, no frequency, no hematuria  Neuro: no headache, no dizziness  Derm : no itching, no rash    ==================>> PHYSICAL EXAM <<=================    GEN: A&O X 3 , NAD , comfortable  HEENT: NCAT, PERRL, MMM, hearing intact  Neck: supple , no JVD  CVS: S1S2 , regular , No M/R/G appreciated  PULM: no significant wheezing or rhonchi noted   ABD.: soft. non tender, non distended,  bowel sounds present  Extrem: intact pulses , no edema   PSYCH : normal mood,  not anxious         ==================>> MEDICATIONS <<====================    ALBUTerol    90 MICROgram(s) HFA Inhaler 1 Puff(s) Inhalation every 4 hours  ALBUTerol/ipratropium for Nebulization 3 milliLiter(s) Nebulizer every 6 hours  azithromycin  IVPB      azithromycin  IVPB 500 milliGRAM(s) IV Intermittent every 24 hours  benzonatate 100 milliGRAM(s) Oral three times a day  gabapentin 200 milliGRAM(s) Oral every 8 hours  lactobacillus acidophilus 1 Tablet(s) Oral three times a day with meals  lidocaine   Patch 1 Patch Transdermal daily  melatonin 3 milliGRAM(s) Oral at bedtime  nicotine - 21 mG/24Hr(s) Patch 1 patch Transdermal daily  piperacillin/tazobactam IVPB. 3.375 Gram(s) IV Intermittent every 8 hours  tiotropium 18 MICROgram(s) Capsule 1 Capsule(s) Inhalation daily    MEDICATIONS  (PRN):  acetaminophen   Tablet 650 milliGRAM(s) Oral every 6 hours PRN For Temp greater than 38 C (100.4 F)  acetaminophen   Tablet. 650 milliGRAM(s) Oral once PRN pain  cyclobenzaprine 5 milliGRAM(s) Oral three times a day PRN Muscle Spasm  ketorolac   Injectable 15 milliGRAM(s) IV Push every 6 hours PRN Moderate Pain (4 - 6)  oxyCODONE    IR 10 milliGRAM(s) Oral every 6 hours PRN Severe Pain (7 - 10)  zolpidem 5 milliGRAM(s) Oral at bedtime PRN Insomnia    ==================>> VITAL SIGNS <<==================    Vital Signs Last 24 Hrs  T(C): 37.4 (08-09-18 @ 20:25)  T(F): 99.4 (08-09-18 @ 20:25), Max: 99.4 (08-09-18 @ 20:25)  HR: 103 (08-09-18 @ 20:25) (85 - 103)  BP: 117/82 (08-09-18 @ 20:25)  BP(mean): --  RR: 17 (08-09-18 @ 20:25) (17 - 18)  SpO2: 96% (08-09-18 @ 20:25) (96% - 96%)       ==================>> LAB AND IMAGING <<==================                        13.4   14.2  )-----------( 349      ( 09 Aug 2018 07:33 )             40.1        08-09    137  |  105  |  13  ----------------------------<  134<H>  3.6   |  21<L>  |  0.82    Ca    9.2      09 Aug 2018 07:33  Phos  3.2     08-09  Mg     2.4     08-09    ___________________________________________________________________________________  ===============>>  A S S E S S M E N T   A N D   P L A N <<===============  ------------------------------------------------------------------------------------------    · Assessment		  51yo female, from home, c/o productive whitish cough, weakness and vomiting since past Saturday.    Patient being admitted for pNA with cavitary lesion, concerns for TB vs Fungal infx.    Problem/Plan - 1:  ·  Problem: PNA with left lung infiltrate w/ cavitation  -Follow up -Sputum Culture, and AFB negative     Quanteferon indeterminant: repeat pending   -c/w Rocephin + Zithromax for now  - bronch planned for tomorrow   -ID and  Pulm appreciated.- BAcid given diarrhea       Cdiff negative    Problem/Plan - 2:  ·  Problem: chronic back pain   appreciated pain mgmt  continue meds as increased.    Problem/Plan - 3:  ·  Problem: HTN   - Continue Current medications as above  -Monitor BP and adjust medication if clinically indicated.  supplement hypokalemia and monitor     Problem/Plan - 4:  ·  Problem: Pre-diabetes  had a long discussion about diagnosis, educated about life style modification, diet, weight loss  no med needed at this time for discharge  monitor FS with RISS as above    -GI/DVT Prophylaxis.    --------------------------------------------  Case discussed with pt, HS  Education given on findings and plan of care  ___________________________  H. MINGO Burns.  Pager: [phone]

## 2018-08-09 NOTE — PROGRESS NOTE ADULT - SUBJECTIVE AND OBJECTIVE BOX
Chief Complaint: acute neck pain and chronic back pain     HPI:   52y Female admitted for admitted for pneumonia and cavitary lung lesion. Patient seen at bedside, NAD, appears comfortable. States neck pain got much better. Reports flexeril helped significantly with muscle spasms. States lower back pain subsided and she does not need oxycodone every 6 hours.   Pt reports feeling stressed because of personal issues Also is worrying about potential diagnosis and medical issues States yesterday developed diarrhea with some abdominal discomfort. Reports 8 loose, nonbloody BM since yesterday. Denies nausea, vomiting, chest pain, SOB.  Afebrile. WBC 14.2, stool for cdiff pending, AFB x 3 negative, repeat QuantiFeron gold pending.  For bronchoscopy tomorrow.      PAIN SCORE:       4/10  SCALE USED: (1-10 VNRS)    Allergies    No Known Allergies    Intolerances      MEDICATIONS  (STANDING):  acetaminophen  IVPB. 1000 milliGRAM(s) IV Intermittent once  ALBUTerol    90 MICROgram(s) HFA Inhaler 1 Puff(s) Inhalation every 4 hours  ALBUTerol/ipratropium for Nebulization 3 milliLiter(s) Nebulizer every 6 hours  azithromycin  IVPB      azithromycin  IVPB 500 milliGRAM(s) IV Intermittent every 24 hours  benzonatate 100 milliGRAM(s) Oral three times a day  gabapentin 200 milliGRAM(s) Oral every 8 hours  lactobacillus acidophilus 1 Tablet(s) Oral three times a day with meals  lidocaine   Patch 1 Patch Transdermal daily  melatonin 3 milliGRAM(s) Oral at bedtime  nicotine - 21 mG/24Hr(s) Patch 1 patch Transdermal daily  piperacillin/tazobactam IVPB. 3.375 Gram(s) IV Intermittent every 8 hours  tiotropium 18 MICROgram(s) Capsule 1 Capsule(s) Inhalation daily    MEDICATIONS  (PRN):  acetaminophen   Tablet 650 milliGRAM(s) Oral every 6 hours PRN For Temp greater than 38 C (100.4 F)  acetaminophen   Tablet. 650 milliGRAM(s) Oral once PRN pain  cyclobenzaprine 5 milliGRAM(s) Oral three times a day PRN Muscle Spasm  ketorolac   Injectable 15 milliGRAM(s) IV Push every 6 hours PRN Moderate Pain (4 - 6)  oxyCODONE    IR 10 milliGRAM(s) Oral every 6 hours PRN Severe Pain (7 - 10)  zolpidem 5 milliGRAM(s) Oral at bedtime PRN Insomnia      PHYSICAL EXAM:    Vital Signs Last 24 Hrs  T(C): 37.2 (09 Aug 2018 05:30), Max: 37.5 (08 Aug 2018 19:56)  T(F): 98.9 (09 Aug 2018 05:30), Max: 99.5 (08 Aug 2018 19:56)  HR: 85 (09 Aug 2018 05:30) (85 - 105)  BP: 119/64 (09 Aug 2018 05:30) (119/64 - 136/67)  BP(mean): --  RR: 18 (09 Aug 2018 05:30) (18 - 18)  SpO2: 96% (09 Aug 2018 05:30) (96% - 98%)             LABS:                          13.7   16.5  )-----------( 373      ( 08 Aug 2018 08:46 )             40.6     08-09    137  |  105  |  13  ----------------------------<  134<H>  3.6   |  21<L>  |  0.82    Ca    9.2      09 Aug 2018 07:33  Phos  3.2     08-09  Mg     2.4     08-09            Drug Screen:        RADIOLOGY:

## 2018-08-10 ENCOUNTER — RESULT REVIEW (OUTPATIENT)
Age: 53
End: 2018-08-10

## 2018-08-10 ENCOUNTER — TRANSCRIPTION ENCOUNTER (OUTPATIENT)
Age: 53
End: 2018-08-10

## 2018-08-10 LAB
A FLAVUS AB FLD QL: NEGATIVE — SIGNIFICANT CHANGE UP
A NIGER AB FLD QL: NEGATIVE — SIGNIFICANT CHANGE UP
A NIGER AB FLD QL: NEGATIVE — SIGNIFICANT CHANGE UP
ACE SERPL-CCNC: 26 U/L — SIGNIFICANT CHANGE UP (ref 14–82)
ANION GAP SERPL CALC-SCNC: 10 MMOL/L — SIGNIFICANT CHANGE UP (ref 5–17)
APTT BLD: 31 SEC — SIGNIFICANT CHANGE UP (ref 27.5–37.4)
BUN SERPL-MCNC: 10 MG/DL — SIGNIFICANT CHANGE UP (ref 7–18)
CALCIUM SERPL-MCNC: 9.1 MG/DL — SIGNIFICANT CHANGE UP (ref 8.4–10.5)
CHLORIDE SERPL-SCNC: 105 MMOL/L — SIGNIFICANT CHANGE UP (ref 96–108)
CO2 SERPL-SCNC: 21 MMOL/L — LOW (ref 22–31)
CREAT SERPL-MCNC: 0.72 MG/DL — SIGNIFICANT CHANGE UP (ref 0.5–1.3)
CULTURE RESULTS: SIGNIFICANT CHANGE UP
GLUCOSE SERPL-MCNC: 135 MG/DL — HIGH (ref 70–99)
GRAM STN FLD: SIGNIFICANT CHANGE UP
HCG UR QL: NEGATIVE — SIGNIFICANT CHANGE UP
HCT VFR BLD CALC: 40.1 % — SIGNIFICANT CHANGE UP (ref 34.5–45)
HGB BLD-MCNC: 13.4 G/DL — SIGNIFICANT CHANGE UP (ref 11.5–15.5)
INR BLD: 1.3 RATIO — HIGH (ref 0.88–1.16)
LEGIONELLA AG UR QL: NEGATIVE — SIGNIFICANT CHANGE UP
MAGNESIUM SERPL-MCNC: 2.2 MG/DL — SIGNIFICANT CHANGE UP (ref 1.6–2.6)
MCHC RBC-ENTMCNC: 30.5 PG — SIGNIFICANT CHANGE UP (ref 27–34)
MCHC RBC-ENTMCNC: 33.5 GM/DL — SIGNIFICANT CHANGE UP (ref 32–36)
MCV RBC AUTO: 91 FL — SIGNIFICANT CHANGE UP (ref 80–100)
PHOSPHATE SERPL-MCNC: 3.4 MG/DL — SIGNIFICANT CHANGE UP (ref 2.5–4.5)
PLATELET # BLD AUTO: 368 K/UL — SIGNIFICANT CHANGE UP (ref 150–400)
POTASSIUM SERPL-MCNC: 3.4 MMOL/L — LOW (ref 3.5–5.3)
POTASSIUM SERPL-SCNC: 3.4 MMOL/L — LOW (ref 3.5–5.3)
PROTHROM AB SERPL-ACNC: 14.3 SEC — HIGH (ref 9.8–12.7)
RBC # BLD: 4.41 M/UL — SIGNIFICANT CHANGE UP (ref 3.8–5.2)
RBC # FLD: 11.3 % — SIGNIFICANT CHANGE UP (ref 10.3–14.5)
SODIUM SERPL-SCNC: 136 MMOL/L — SIGNIFICANT CHANGE UP (ref 135–145)
SPECIMEN SOURCE: SIGNIFICANT CHANGE UP
WBC # BLD: 12.7 K/UL — HIGH (ref 3.8–10.5)
WBC # FLD AUTO: 12.7 K/UL — HIGH (ref 3.8–10.5)

## 2018-08-10 PROCEDURE — 71045 X-RAY EXAM CHEST 1 VIEW: CPT | Mod: 26

## 2018-08-10 PROCEDURE — 88305 TISSUE EXAM BY PATHOLOGIST: CPT | Mod: 26

## 2018-08-10 PROCEDURE — 88112 CYTOPATH CELL ENHANCE TECH: CPT | Mod: 26

## 2018-08-10 RX ORDER — SODIUM CHLORIDE 9 MG/ML
1000 INJECTION INTRAMUSCULAR; INTRAVENOUS; SUBCUTANEOUS ONCE
Qty: 0 | Refills: 0 | Status: COMPLETED | OUTPATIENT
Start: 2018-08-10 | End: 2018-08-10

## 2018-08-10 RX ORDER — SODIUM CHLORIDE 9 MG/ML
1000 INJECTION, SOLUTION INTRAVENOUS
Qty: 0 | Refills: 0 | Status: DISCONTINUED | OUTPATIENT
Start: 2018-08-10 | End: 2018-08-10

## 2018-08-10 RX ORDER — BENZOCAINE AND MENTHOL 5; 1 G/100ML; G/100ML
1 LIQUID ORAL ONCE
Qty: 0 | Refills: 0 | Status: COMPLETED | OUTPATIENT
Start: 2018-08-10 | End: 2018-08-10

## 2018-08-10 RX ORDER — LAMOTRIGINE 25 MG/1
25 TABLET, ORALLY DISINTEGRATING ORAL
Qty: 0 | Refills: 0 | Status: DISCONTINUED | OUTPATIENT
Start: 2018-08-10 | End: 2018-08-13

## 2018-08-10 RX ORDER — FENTANYL CITRATE 50 UG/ML
25 INJECTION INTRAVENOUS
Qty: 0 | Refills: 0 | Status: DISCONTINUED | OUTPATIENT
Start: 2018-08-10 | End: 2018-08-10

## 2018-08-10 RX ORDER — BENZOCAINE AND MENTHOL 5; 1 G/100ML; G/100ML
1 LIQUID ORAL
Qty: 0 | Refills: 0 | Status: DISCONTINUED | OUTPATIENT
Start: 2018-08-10 | End: 2018-08-10

## 2018-08-10 RX ORDER — POTASSIUM CHLORIDE 20 MEQ
40 PACKET (EA) ORAL ONCE
Qty: 0 | Refills: 0 | Status: COMPLETED | OUTPATIENT
Start: 2018-08-10 | End: 2018-08-10

## 2018-08-10 RX ORDER — ACETAMINOPHEN 500 MG
1000 TABLET ORAL ONCE
Qty: 0 | Refills: 0 | Status: COMPLETED | OUTPATIENT
Start: 2018-08-10 | End: 2018-08-10

## 2018-08-10 RX ORDER — ESCITALOPRAM OXALATE 10 MG/1
5 TABLET, FILM COATED ORAL DAILY
Qty: 0 | Refills: 0 | Status: DISCONTINUED | OUTPATIENT
Start: 2018-08-10 | End: 2018-08-13

## 2018-08-10 RX ORDER — ONDANSETRON 8 MG/1
4 TABLET, FILM COATED ORAL ONCE
Qty: 0 | Refills: 0 | Status: DISCONTINUED | OUTPATIENT
Start: 2018-08-10 | End: 2018-08-10

## 2018-08-10 RX ORDER — LIDOCAINE 4 G/100G
1 CREAM TOPICAL ONCE
Qty: 0 | Refills: 0 | Status: COMPLETED | OUTPATIENT
Start: 2018-08-10 | End: 2018-08-10

## 2018-08-10 RX ORDER — BENZOCAINE AND MENTHOL 5; 1 G/100ML; G/100ML
1 LIQUID ORAL
Qty: 0 | Refills: 0 | Status: DISCONTINUED | OUTPATIENT
Start: 2018-08-10 | End: 2018-08-13

## 2018-08-10 RX ADMIN — PIPERACILLIN AND TAZOBACTAM 25 GRAM(S): 4; .5 INJECTION, POWDER, LYOPHILIZED, FOR SOLUTION INTRAVENOUS at 08:01

## 2018-08-10 RX ADMIN — OXYCODONE HYDROCHLORIDE 10 MILLIGRAM(S): 5 TABLET ORAL at 03:08

## 2018-08-10 RX ADMIN — Medication 1 PATCH: at 09:28

## 2018-08-10 RX ADMIN — AZITHROMYCIN 250 MILLIGRAM(S): 500 TABLET, FILM COATED ORAL at 05:33

## 2018-08-10 RX ADMIN — OXYCODONE HYDROCHLORIDE 10 MILLIGRAM(S): 5 TABLET ORAL at 09:24

## 2018-08-10 RX ADMIN — Medication 1000 MILLIGRAM(S): at 16:36

## 2018-08-10 RX ADMIN — Medication 3 MILLIGRAM(S): at 21:45

## 2018-08-10 RX ADMIN — FENTANYL CITRATE 25 MICROGRAM(S): 50 INJECTION INTRAVENOUS at 16:21

## 2018-08-10 RX ADMIN — LIDOCAINE 1 PATCH: 4 CREAM TOPICAL at 21:40

## 2018-08-10 RX ADMIN — GABAPENTIN 200 MILLIGRAM(S): 400 CAPSULE ORAL at 05:34

## 2018-08-10 RX ADMIN — OXYCODONE HYDROCHLORIDE 10 MILLIGRAM(S): 5 TABLET ORAL at 01:35

## 2018-08-10 RX ADMIN — Medication 40 MILLIEQUIVALENT(S): at 11:03

## 2018-08-10 RX ADMIN — Medication 400 MILLIGRAM(S): at 16:21

## 2018-08-10 RX ADMIN — OXYCODONE HYDROCHLORIDE 10 MILLIGRAM(S): 5 TABLET ORAL at 10:00

## 2018-08-10 RX ADMIN — OXYCODONE HYDROCHLORIDE 10 MILLIGRAM(S): 5 TABLET ORAL at 19:54

## 2018-08-10 RX ADMIN — Medication 3 MILLILITER(S): at 20:28

## 2018-08-10 RX ADMIN — Medication 1 TABLET(S): at 17:36

## 2018-08-10 RX ADMIN — GABAPENTIN 200 MILLIGRAM(S): 400 CAPSULE ORAL at 21:41

## 2018-08-10 RX ADMIN — BENZOCAINE AND MENTHOL 1 LOZENGE: 5; 1 LIQUID ORAL at 18:44

## 2018-08-10 RX ADMIN — BENZOCAINE AND MENTHOL 1 LOZENGE: 5; 1 LIQUID ORAL at 16:30

## 2018-08-10 RX ADMIN — BENZOCAINE AND MENTHOL 1 LOZENGE: 5; 1 LIQUID ORAL at 19:54

## 2018-08-10 RX ADMIN — LAMOTRIGINE 25 MILLIGRAM(S): 25 TABLET, ORALLY DISINTEGRATING ORAL at 17:36

## 2018-08-10 RX ADMIN — Medication 1 PATCH: at 11:03

## 2018-08-10 RX ADMIN — Medication 1 TABLET(S): at 09:24

## 2018-08-10 RX ADMIN — Medication 100 MILLIGRAM(S): at 05:34

## 2018-08-10 RX ADMIN — PIPERACILLIN AND TAZOBACTAM 25 GRAM(S): 4; .5 INJECTION, POWDER, LYOPHILIZED, FOR SOLUTION INTRAVENOUS at 21:41

## 2018-08-10 RX ADMIN — ZOLPIDEM TARTRATE 5 MILLIGRAM(S): 10 TABLET ORAL at 00:55

## 2018-08-10 RX ADMIN — OXYCODONE HYDROCHLORIDE 10 MILLIGRAM(S): 5 TABLET ORAL at 20:28

## 2018-08-10 RX ADMIN — FENTANYL CITRATE 25 MICROGRAM(S): 50 INJECTION INTRAVENOUS at 16:06

## 2018-08-10 RX ADMIN — ESCITALOPRAM OXALATE 5 MILLIGRAM(S): 10 TABLET, FILM COATED ORAL at 11:04

## 2018-08-10 RX ADMIN — SODIUM CHLORIDE 1000 MILLILITER(S): 9 INJECTION INTRAMUSCULAR; INTRAVENOUS; SUBCUTANEOUS at 08:01

## 2018-08-10 RX ADMIN — CYCLOBENZAPRINE HYDROCHLORIDE 5 MILLIGRAM(S): 10 TABLET, FILM COATED ORAL at 21:41

## 2018-08-10 RX ADMIN — Medication 100 MILLIGRAM(S): at 21:40

## 2018-08-10 RX ADMIN — LIDOCAINE 1 PATCH: 4 CREAM TOPICAL at 01:34

## 2018-08-10 RX ADMIN — LAMOTRIGINE 25 MILLIGRAM(S): 25 TABLET, ORALLY DISINTEGRATING ORAL at 11:03

## 2018-08-10 NOTE — PROGRESS NOTE ADULT - SUBJECTIVE AND OBJECTIVE BOX
Patient is a 52y old  Female who presents with a chief complaint of SOB (06 Aug 2018 00:16)    Awake, alert, comfortable in bed in NAD.  Pain in neck and upper back improved. Has hx of smoking and has been told to have Copd in the past. Feeling better. Sputum for AFB x 3 neg. Scheduled for Bronch today. Pt has no new complaints. Being evaluated by Psych because of depression.    INTERVAL HPI/OVERNIGHT EVENTS:      VITAL SIGNS:  T(F): 98.3 (08-10-18 @ 05:20)  HR: 90 (08-10-18 @ 06:00)  BP: 115/57 (08-10-18 @ 06:00)  RR: 17 (08-10-18 @ 05:20)  SpO2: 96% (08-10-18 @ 05:20)  Wt(kg): --  I&O's Detail      REVIEW OF SYSTEMS:    CONSTITUTIONAL:  No fevers, chills, sweats    HEENT:  Eyes:  No diplopia or blurred vision. ENT:  No earache, sore throat or runny nose.    CARDIOVASCULAR:  No pressure, squeezing, tightness, or heaviness about the chest; no palpitations.    RESPIRATORY:  Per HPI    GASTROINTESTINAL:  No abdominal pain, nausea, vomiting or diarrhea.    GENITOURINARY:  No dysuria, frequency or urgency.    NEUROLOGIC:  No paresthesias, fasciculations, seizures or weakness.    PSYCHIATRIC:  No disorder of thought or mood.      PHYSICAL EXAM:    Constitutional: Well developed and nourished  Eyes:Perrla  ENMT: normal  Neck:supple  Respiratory: + Rales on left upper back  Cardiovascular: S1 S2 regular  Gastrointestinal: Soft, Non tender  Extremities: No edema  Vascular:normal  Neurological:Awake, alert,Ox3  Musculoskeletal:Normal      MEDICATIONS  (STANDING):  ALBUTerol    90 MICROgram(s) HFA Inhaler 1 Puff(s) Inhalation every 4 hours  ALBUTerol/ipratropium for Nebulization 3 milliLiter(s) Nebulizer every 6 hours  azithromycin  IVPB      azithromycin  IVPB 500 milliGRAM(s) IV Intermittent every 24 hours  benzonatate 100 milliGRAM(s) Oral three times a day  escitalopram 5 milliGRAM(s) Oral daily  gabapentin 200 milliGRAM(s) Oral every 8 hours  lactobacillus acidophilus 1 Tablet(s) Oral three times a day with meals  lamoTRIgine 25 milliGRAM(s) Oral two times a day  lidocaine   Patch 1 Patch Transdermal daily  melatonin 3 milliGRAM(s) Oral at bedtime  nicotine - 21 mG/24Hr(s) Patch 1 patch Transdermal daily  piperacillin/tazobactam IVPB. 3.375 Gram(s) IV Intermittent every 8 hours  tiotropium 18 MICROgram(s) Capsule 1 Capsule(s) Inhalation daily    MEDICATIONS  (PRN):  acetaminophen   Tablet 650 milliGRAM(s) Oral every 6 hours PRN For Temp greater than 38 C (100.4 F)  acetaminophen   Tablet. 650 milliGRAM(s) Oral once PRN pain  cyclobenzaprine 5 milliGRAM(s) Oral three times a day PRN Muscle Spasm  ketorolac   Injectable 15 milliGRAM(s) IV Push every 6 hours PRN Moderate Pain (4 - 6)  oxyCODONE    IR 10 milliGRAM(s) Oral every 6 hours PRN Severe Pain (7 - 10)  zolpidem 5 milliGRAM(s) Oral at bedtime PRN Insomnia      Allergies    No Known Allergies    Intolerances        LABS:                        13.4   12.7  )-----------( 368      ( 10 Aug 2018 08:17 )             40.1     08-10    136  |  105  |  10  ----------------------------<  135<H>  3.4<L>   |  21<L>  |  0.72    Ca    9.1      10 Aug 2018 08:17  Phos  3.4     08-10  Mg     2.2     08-10      PT/INR - ( 10 Aug 2018 08:17 )   PT: 14.3 sec;   INR: 1.30 ratio         PTT - ( 10 Aug 2018 08:17 )  PTT:31.0 sec          CAPILLARY BLOOD GLUCOSE        pro-bnp 191 08-05 @ 16:33     d-dimer 574  08-05 @ 16:33      Quantiferon Plus TB (08.06.18 @ 11:18)    Quantiferon TB Plus: INDETERMINATE Results are indeterminate;  possible impaired cellular immune response.  The magnitude of the measured IFN gamma level cannot be correlated to  stage or degree of Infection, level of immune responsiveness, or  likelihood for progressionto active disease.  Negative means no interferon-gamma response to M. tuberculosis antigens  was detected. Infection with M. tuberculosis is NOT likely.  Positive means interferon-gamma response to M. tuberculosis antigens was  detected suggesting infection with M. tuberculosis. False positive  results may occur in patients with prior infection with M. marinum, M.  szulgai or M. kansasii.  Indeterminate means likelihood for M. tuberculosis infection is uncertain.  The results of this test cannot be used alone to make a diagnosis of  active or latent tuberculosis infection. To make either diagnosis, the  patient’s entire medical presentation must be considered. For further  information on the interpretation of this test refer to  (http://www.cdc.gov/nchstp/tb/).    Quantiferon TB Plus Nil: 0.03 IU/mL    Quantiferon TB Plus TB1 minus Nil: -0.01 IU/mL    Quantiferon TB Plus TB2 minus Nil: -0.02 IU/mL    Quant TB Plus Mitogen minus Nil: 0.43 IU/mL        RADIOLOGY & ADDITIONAL TESTS:    CXR:  < from: Xray Chest 1 View- PORTABLE-Urgent (08.08.18 @ 10:36) >  IMPRESSION:  Similar left cavitation.        < end of copied text >    Ct scan chest:  < from: CT Angio Chest w/ IV Cont (08.06.18 @ 00:24) >  IMPRESSION:  No pulmonary embolism.    Left upper lobe cavitary mass versus cavitary pneumonia. Small   mediastinal and left hilar lymph nodes which could be reactive from   infection or metastatic if malignancy.    < end of copied text >    ekg;    echo:

## 2018-08-10 NOTE — DISCHARGE NOTE ADULT - MEDICATION SUMMARY - MEDICATIONS TO TAKE
I will START or STAY ON the medications listed below when I get home from the hospital:    traMADol 50 mg oral tablet  -- 0.5 tab(s) by mouth 2 times a day MDD:2 tab of 25 mg  -- Caution federal law prohibits the transfer of this drug to any person other  than the person for whom it was prescribed.  May cause drowsiness.  Alcohol may intensify this effect.  Use care when operating dangerous machinery.  Obtain medical advice before taking any non-prescription drugs as some may affect the action of this medication.    -- Indication: For Pain, as needed    gabapentin 100 mg oral capsule  -- 2 cap(s) by mouth every 8 hours  -- Indication: For Nerve pain    lamoTRIgine 25 mg oral tablet  -- 1 tab(s) by mouth 2 times a day  -- Indication: For Depression    escitalopram 5 mg oral tablet  -- 1 tab(s) by mouth once a day  -- Indication: For Depression    benzonatate 100 mg oral capsule  -- 1 cap(s) by mouth 3 times a day  -- Indication: For Cough    Symbicort 160 mcg-4.5 mcg/inh inhalation aerosol  -- 2 puff(s) inhaled 2 times a day   -- Check with your doctor before becoming pregnant.  For inhalation only.  Rinse mouth thoroughly after use.    -- Indication: For COPD (chronic obstructive pulmonary disease)    tiotropium 18 mcg inhalation capsule  -- 1 cap(s) inhaled once a day  -- Indication: For COPD (chronic obstructive pulmonary disease)    cyclobenzaprine 5 mg oral tablet  -- 1 tab(s) by mouth 3 times a day, As needed, Muscle Spasm MDD:3 tab  -- Indication: For Muscle spasm, as needed    Levaquin 500 mg oral tablet  -- 1 tab(s) by mouth once a day   -- Avoid prolonged or excessive exposure to direct and/or artificial sunlight while taking this medication.  Do not take dairy products, antacids, or iron preparations within one hour of this medication.  Finish all this medication unless otherwise directed by prescriber.  May cause drowsiness or dizziness.  Medication should be taken with plenty of water.    -- Indication: For PNA (pneumonia)    nicotine 21 mg/24 hr transdermal film, extended release  -- 1 patch by transdermal patch once a day  -- Indication: For smoking cessation I will START or STAY ON the medications listed below when I get home from the hospital:    traMADol 50 mg oral tablet  -- 0.5 tab(s) by mouth 2 times a day MDD:2 tab of 25 mg  -- Caution federal law prohibits the transfer of this drug to any person other  than the person for whom it was prescribed.  May cause drowsiness.  Alcohol may intensify this effect.  Use care when operating dangerous machinery.  Obtain medical advice before taking any non-prescription drugs as some may affect the action of this medication.    -- Indication: For Pain     gabapentin 100 mg oral capsule  -- 2 cap(s) by mouth every 8 hours  -- Indication: For Pain     lamoTRIgine 25 mg oral tablet  -- 1 tab(s) by mouth 2 times a day  -- Indication: For mood     escitalopram 5 mg oral tablet  -- 1 tab(s) by mouth once a day  -- Indication: For Depression    benzonatate 100 mg oral capsule  -- 1 cap(s) by mouth 3 times a day  -- Indication: For COugh    Symbicort 160 mcg-4.5 mcg/inh inhalation aerosol  -- 2 puff(s) inhaled 2 times a day   -- Check with your doctor before becoming pregnant.  For inhalation only.  Rinse mouth thoroughly after use.    -- Indication: For COPD (chronic obstructive pulmonary disease)    tiotropium 18 mcg inhalation capsule  -- 1 cap(s) inhaled once a day  -- Indication: For COPD (chronic obstructive pulmonary disease)    cyclobenzaprine 5 mg oral tablet  -- 1 tab(s) by mouth 3 times a day, As needed, Muscle Spasm MDD:3 tab  -- Indication: For spasm     Levaquin 500 mg oral tablet  -- 1 tab(s) by mouth once a day   -- Avoid prolonged or excessive exposure to direct and/or artificial sunlight while taking this medication.  Do not take dairy products, antacids, or iron preparations within one hour of this medication.  Finish all this medication unless otherwise directed by prescriber.  May cause drowsiness or dizziness.  Medication should be taken with plenty of water.    -- Indication: For PNA (pneumonia)    nicotine 21 mg/24 hr transdermal film, extended release  -- 1 patch by transdermal patch once a day  -- Indication: For smoking

## 2018-08-10 NOTE — DISCHARGE NOTE ADULT - NSTOBACCOHOTLINE_GEN_A_CS
[facility] Smokers Quitline ([phone]-JX-FLKPE) [facility] Smokers Quitline ([phone]-GX-HFNDK)

## 2018-08-10 NOTE — DISCHARGE NOTE ADULT - COMMUNITY RESOURCES
[facility], [address] [place], [place] [id number], [phone], 08/20/2018, 2:00 PM.

## 2018-08-10 NOTE — PROGRESS NOTE ADULT - ASSESSMENT
51yo female, from home, c/o productive whitish cough, weakness and vomiting since past Saturday.    Patient being admitted for PNA with cavitary lesion, concerns for TB vs Fungal infx.  Bronchoscopy today

## 2018-08-10 NOTE — PROGRESS NOTE ADULT - SUBJECTIVE AND OBJECTIVE BOX
PGY 2 Note discussed with primary attending    Patient is a 52y old  Female who presents with a chief complaint of SOB (06 Aug 2018 00:16)    INTERVAL HPI/OVERNIGHT EVENTS: No acute events reported overnight.  Today pt presents in no acute distress.  Pt found resting comfortably in bed.    No new complaints reported today      MEDICATIONS  (STANDING):  ALBUTerol    90 MICROgram(s) HFA Inhaler 1 Puff(s) Inhalation every 4 hours  ALBUTerol/ipratropium for Nebulization 3 milliLiter(s) Nebulizer every 6 hours  azithromycin  IVPB      azithromycin  IVPB 500 milliGRAM(s) IV Intermittent every 24 hours  benzonatate 100 milliGRAM(s) Oral three times a day  gabapentin 200 milliGRAM(s) Oral every 8 hours  lactobacillus acidophilus 1 Tablet(s) Oral three times a day with meals  lidocaine   Patch 1 Patch Transdermal daily  melatonin 3 milliGRAM(s) Oral at bedtime  nicotine - 21 mG/24Hr(s) Patch 1 patch Transdermal daily  piperacillin/tazobactam IVPB. 3.375 Gram(s) IV Intermittent every 8 hours  tiotropium 18 MICROgram(s) Capsule 1 Capsule(s) Inhalation daily    MEDICATIONS  (PRN):  acetaminophen   Tablet 650 milliGRAM(s) Oral every 6 hours PRN For Temp greater than 38 C (100.4 F)  acetaminophen   Tablet. 650 milliGRAM(s) Oral once PRN pain  cyclobenzaprine 5 milliGRAM(s) Oral three times a day PRN Muscle Spasm  ketorolac   Injectable 15 milliGRAM(s) IV Push every 6 hours PRN Moderate Pain (4 - 6)  oxyCODONE    IR 10 milliGRAM(s) Oral every 6 hours PRN Severe Pain (7 - 10)  zolpidem 5 milliGRAM(s) Oral at bedtime PRN Insomnia      __________________________________________________  REVIEW OF SYSTEMS:    CONSTITUTIONAL: No fever,   EYES: no acute visual disturbances  NECK: No pain or stiffness  RESPIRATORY: No cough; No shortness of breath  CARDIOVASCULAR: No chest pain, no palpitations  GASTROINTESTINAL: No pain. No nausea or vomiting; No diarrhea   NEUROLOGICAL: No headache or numbness, no tremors  MUSCULOSKELETAL: No joint pain, no muscle pain     Vital Signs Last 24 Hrs  T(C): 36.8 (10 Aug 2018 05:20), Max: 37.4 (09 Aug 2018 20:25)  T(F): 98.3 (10 Aug 2018 05:20), Max: 99.4 (09 Aug 2018 20:25)  HR: 88 (10 Aug 2018 05:20) (87 - 103)  BP: 88/58 (10 Aug 2018 05:20) (88/58 - 117/82  RR: 17 (10 Aug 2018 05:20) (17 - 17)  SpO2: 96% (10 Aug 2018 05:20) (96% - 96%)  ________________________________________________  PHYSICAL EXAM:  GENERAL: NAD  HEENT: Normocephalic;  conjunctivae and sclerae clear; moist mucous membranes;   NECK : supple; Pain with neck flexion   CHEST/LUNG: Ronchorus breath sound to HALIMA  HEART: S1 S2  regular; no murmurs, gallops or rubs  ABDOMEN: Soft, Nontender, Nondistended; Bowel sounds present  EXTREMITIES: no cyanosis; no edema; no calf tenderness  NERVOUS SYSTEM:  Awake and alert; Oriented  to place, person and time ; no new deficits    _________________________________________________  LABS:                RADIOLOGY & ADDITIONAL TESTS:    Imaging Personally Reviewed:  YES    Consultant(s) Notes Reviewed:   YES    Care Discussed with Consultants :     Plan of care was discussed with patient and /or primary care giver; all questions and concerns were addressed and care was aligned with patient's wishes. PGY 2 Note discussed with primary attending    Patient is a 52y old  Female who presents with a chief complaint of SOB (06 Aug 2018 00:16)    INTERVAL HPI/OVERNIGHT EVENTS: No acute events reported overnight.  Today pt presents in no acute distress.  Pt found resting comfortably in bed.    Pt appears to have depressed affect.  Now on Antidepressant per psych rec.  No new complaints reported today      MEDICATIONS  (STANDING):  ALBUTerol    90 MICROgram(s) HFA Inhaler 1 Puff(s) Inhalation every 4 hours  ALBUTerol/ipratropium for Nebulization 3 milliLiter(s) Nebulizer every 6 hours  azithromycin  IVPB      azithromycin  IVPB 500 milliGRAM(s) IV Intermittent every 24 hours  benzonatate 100 milliGRAM(s) Oral three times a day  gabapentin 200 milliGRAM(s) Oral every 8 hours  lactobacillus acidophilus 1 Tablet(s) Oral three times a day with meals  lidocaine   Patch 1 Patch Transdermal daily  melatonin 3 milliGRAM(s) Oral at bedtime  nicotine - 21 mG/24Hr(s) Patch 1 patch Transdermal daily  piperacillin/tazobactam IVPB. 3.375 Gram(s) IV Intermittent every 8 hours  tiotropium 18 MICROgram(s) Capsule 1 Capsule(s) Inhalation daily    MEDICATIONS  (PRN):  acetaminophen   Tablet 650 milliGRAM(s) Oral every 6 hours PRN For Temp greater than 38 C (100.4 F)  acetaminophen   Tablet. 650 milliGRAM(s) Oral once PRN pain  cyclobenzaprine 5 milliGRAM(s) Oral three times a day PRN Muscle Spasm  ketorolac   Injectable 15 milliGRAM(s) IV Push every 6 hours PRN Moderate Pain (4 - 6)  oxyCODONE    IR 10 milliGRAM(s) Oral every 6 hours PRN Severe Pain (7 - 10)  zolpidem 5 milliGRAM(s) Oral at bedtime PRN Insomnia      __________________________________________________  REVIEW OF SYSTEMS:    CONSTITUTIONAL: No fever,   EYES: no acute visual disturbances  NECK: No pain or stiffness  RESPIRATORY: No cough; No shortness of breath  CARDIOVASCULAR: No chest pain, no palpitations  GASTROINTESTINAL: No pain. No nausea or vomiting; No diarrhea   NEUROLOGICAL: No headache or numbness, no tremors  MUSCULOSKELETAL: No joint pain, no muscle pain     Vital Signs Last 24 Hrs  T(C): 36.8 (10 Aug 2018 05:20), Max: 37.4 (09 Aug 2018 20:25)  T(F): 98.3 (10 Aug 2018 05:20), Max: 99.4 (09 Aug 2018 20:25)  HR: 88 (10 Aug 2018 05:20) (87 - 103)  BP: 88/58 (10 Aug 2018 05:20) (88/58 - 117/82  RR: 17 (10 Aug 2018 05:20) (17 - 17)  SpO2: 96% (10 Aug 2018 05:20) (96% - 96%)  ________________________________________________  PHYSICAL EXAM:  GENERAL: NAD  HEENT: Normocephalic;  conjunctivae and sclerae clear; moist mucous membranes;   NECK : supple; Pain with neck flexion   CHEST/LUNG: Ronchorus breath sound to HALIMA  HEART: S1 S2  regular; no murmurs, gallops or rubs  ABDOMEN: Soft, Nontender, Nondistended; Bowel sounds present  EXTREMITIES: no cyanosis; no edema; no calf tenderness  NERVOUS SYSTEM:  Awake and alert; Oriented  to place, person and time ; no new deficits    _________________________________________________  LABS:                                   13.4   12.7  )-----------( 368      ( 10 Aug 2018 08:17 )             40.1     08-10    136  |  105  |  10  ----------------------------<  135<H>  3.4<L>   |  21<L>  |  0.72    Ca    9.1      10 Aug 2018 08:17  Phos  3.4     08-10  Mg     2.2     08-10         RADIOLOGY & ADDITIONAL TESTS:    Imaging Personally Reviewed:  YES    Consultant(s) Notes Reviewed:   YES    Care Discussed with Consultants :     Plan of care was discussed with patient and /or primary care giver; all questions and concerns were addressed and care was aligned with patient's wishes.

## 2018-08-10 NOTE — DISCHARGE NOTE ADULT - CARE PLAN
Principal Discharge DX:	Cavitating mass in left upper lung lobe  Goal:	Please follow up with Pulmonology, Dr. Wagner, in 1 week  Assessment and plan of treatment:	INCOMPLETE  You presented with a cough, and were found to have a cavitary lesion in your Left upper lung.  You were ruled out for Tuberculosis with three neg sputum cultured, and neg PPD.  You underwent a bronchoscopy significant for >>>  Secondary Diagnosis:	COPD (chronic obstructive pulmonary disease)  Goal:	Please continue inhaler therapy as prescribed  Assessment and plan of treatment:	You did not present in exacerbation and were continued on inhaler therapy.  Please continue current therapy.  Secondary Diagnosis:	HTN (hypertension)  Assessment and plan of treatment:	BP meds held due to hypotension>>>>  Secondary Diagnosis:	Depression  Goal:	Please continue lexapro therapy and follow up with OP psychiatry  Assessment and plan of treatment:	You appeared to have worsening depression during your stay and were evaluated by psychiatry, Dr. Clay, who recommended Lexapro therapy.  Please continue your current therapy, and follow up with OP psychiatry within 1 week of discharge for continued management. Principal Discharge DX:	Cavitating mass in left upper lung lobe  Goal:	Please follow up with Pulmonology, Dr. Wagner, in 1 week  Assessment and plan of treatment:	You presented with a cough, and were found to have a cavitary lesion in your Left upper lung.  You were ruled out for Tuberculosis with three neg sputum cultured, and neg QuantiFeron Gold.  You underwent a bronchoscopy 8/10 and results are pending. You need to follow up with Pulmonologist Dr. Wagner within a week for the results and for further follow up. Complete the antibiotic course as prescribed. Leukocytosis still present, so get repeat blood work to check wbc count after a week after finishing antibiotics. If still persistent, consider follow up with Hematologist for further follow up.  Secondary Diagnosis:	COPD (chronic obstructive pulmonary disease)  Goal:	Please continue inhaler therapy as prescribed  Assessment and plan of treatment:	You did not present in exacerbation and were continued on inhaler therapy.  Please continue current therapy.  Secondary Diagnosis:	HTN (hypertension)  Assessment and plan of treatment:	BP meds held due to hypotension on admission. Currently BP low normal. Do not take Bystolic for now. Follow up with PCP within a week if discharge and consider to restart if needed.  Secondary Diagnosis:	Depression  Assessment and plan of treatment:	You appeared to have worsening depression during your stay and were evaluated by psychiatry, Dr. Clay, who recommended Lexapro and Lamictal therapy.  Please continue your current therapy, and follow up with OP psychiatry within 1 week of discharge for continued management.

## 2018-08-10 NOTE — DISCHARGE NOTE ADULT - MEDICATION SUMMARY - MEDICATIONS TO STOP TAKING
I will STOP taking the medications listed below when I get home from the hospital:    Bystolic 2.5 mg oral tablet  -- 1 tab(s) by mouth once a day I will STOP taking the medications listed below when I get home from the hospital:  None

## 2018-08-10 NOTE — PROGRESS NOTE ADULT - PROBLEM SELECTOR PLAN 2
-Patient p/w left lower lobe cavitary lesion, will r/o TB, could be Staph aureus but -patient not p/w high fevers. ?Fungal cavitary lesion  -3 neg AFB sputum, Quantiferon indeterminate   -Aspergillus serology, HIV   -SPutum neg 2/3  -Bronch today  -Pulm: Dr. Wagner

## 2018-08-10 NOTE — DIETITIAN INITIAL EVALUATION ADULT. - OTHER INFO
Nutrition assessment for LOS x 6 days. Pt w/ poor appetite and PO intake for years. Pt w/ autistic son, , reports that she is "depressed," doesn't have appetite for food and never really feels like eating. Denies any wt loss. Encouraged pt to try to increase PO intake to best of her ability and consume small, frequent meals when possible.

## 2018-08-10 NOTE — PROGRESS NOTE ADULT - SUBJECTIVE AND OBJECTIVE BOX
_________________________________________________________________________________________  ========>>  M E D I C A L   A T T E N D I N G    F O L L O W  U P  N O T E  <<=========  -----------------------------------------------------------------------------------------------------    - Patient seen and examined by me approximately thirty minutes ago.   - In summary,  JOSE MAYER is a 52y year old woman who originally presented with cough, SOB  - Patient today overall doing ok, comfortable, eating OK.    Pt with no more diarrhea     ==================>> REVIEW OF SYSTEM <<=================    GEN: no fever, no chills, + multilevel pain including chronic back and some neck pain, stable  RESP: no SOB at rest,  +cough and little sputum  CVS: no chest pain, no palpitations, no edema  GI: no abdominal pain, no nausea, no diarrhea   : no dysuria, no frequency, no hematuria  Neuro: no headache, no dizziness  Derm : no itching, no rash     + feels depressed and anxious due to issues with children at home...    ==================>> PHYSICAL EXAM <<=================    GEN: A&O X 3 , NAD , comfortable  HEENT: NCAT, PERRL, MMM, hearing intact  Neck: supple , no JVD  CVS: S1S2 , regular , No M/R/G appreciated  PULM: no significant wheezing or rhonchi noted   ABD.: soft. non tender, non distended,  bowel sounds present  Extrem: intact pulses , no edema   PSYCH : flat affect         ==================>> MEDICATIONS <<====================    ALBUTerol    90 MICROgram(s) HFA Inhaler 1 Puff(s) Inhalation every 4 hours  ALBUTerol/ipratropium for Nebulization 3 milliLiter(s) Nebulizer every 6 hours  azithromycin  IVPB      azithromycin  IVPB 500 milliGRAM(s) IV Intermittent every 24 hours  benzonatate 100 milliGRAM(s) Oral three times a day  escitalopram 5 milliGRAM(s) Oral daily  gabapentin 200 milliGRAM(s) Oral every 8 hours  lactobacillus acidophilus 1 Tablet(s) Oral three times a day with meals  lamoTRIgine 25 milliGRAM(s) Oral two times a day  lidocaine   Patch 1 Patch Transdermal daily  melatonin 3 milliGRAM(s) Oral at bedtime  nicotine - 21 mG/24Hr(s) Patch 1 patch Transdermal daily  piperacillin/tazobactam IVPB. 3.375 Gram(s) IV Intermittent every 8 hours  tiotropium 18 MICROgram(s) Capsule 1 Capsule(s) Inhalation daily    MEDICATIONS  (PRN):  acetaminophen   Tablet 650 milliGRAM(s) Oral every 6 hours PRN For Temp greater than 38 C (100.4 F)  acetaminophen   Tablet. 650 milliGRAM(s) Oral once PRN pain  cyclobenzaprine 5 milliGRAM(s) Oral three times a day PRN Muscle Spasm  ketorolac   Injectable 15 milliGRAM(s) IV Push every 6 hours PRN Moderate Pain (4 - 6)  oxyCODONE    IR 10 milliGRAM(s) Oral every 6 hours PRN Severe Pain (7 - 10)  zolpidem 5 milliGRAM(s) Oral at bedtime PRN Insomnia    ==================>> VITAL SIGNS <<==================    Vital Signs Last 24 Hrs  T(C): 36.8 (08-10-18 @ 05:20)  T(F): 98.3 (08-10-18 @ 05:20), Max: 99.4 (08-09-18 @ 20:25)  HR: 90 (08-10-18 @ 06:00) (87 - 103)  BP: 115/57 (08-10-18 @ 06:00)  BP(mean): --  RR: 17 (08-10-18 @ 05:20) (17 - 17)  SpO2: 96% (08-10-18 @ 05:20) (96% - 96%)       ==================>> LAB AND IMAGING <<==================                        13.4   12.7  )-----------( 368      ( 10 Aug 2018 08:17 )             40.1        08-10    136  |  105  |  10  ----------------------------<  135<H>  3.4<L>   |  21<L>  |  0.72    Sodium:   136  <==, 137  <==, 137  <==, 140  <==, 141  <==, 141  <==    Ca    9.1      10 Aug 2018 08:17  Phos  3.4     08-10  Mg     2.2     08-10    ___________________________________________________________________________________  ===============>>  A S S E S S M E N T   A N D   P L A N <<===============  ------------------------------------------------------------------------------------------    · Assessment		  51yo female, from home, c/o productive whitish cough, weakness and vomiting since past Saturday.    Patient being admitted for pNA with cavitary lesion, concerns for TB vs Fungal infx.    Problem/Plan - 1:  ·  Problem: PNA with left lung infiltrate w/ cavitation  -c/w Rocephin + Zithromax for now  - bronch planned for today  -ID and  Pulm appreciated      Dispo planing pending Bronch and clearance for DC on Oral abx?    Problem/Plan - 2:  ·  Problem: chronic back pain   appreciated pain mgmt  continue meds as increased.    Problem/Plan - 3:  ·  Problem: HTN   - Continue Current medications as above  -Monitor BP and adjust medication if clinically indicated.  supplement hypokalemia and monitor     Problem/Plan - 4:  ·  Problem: Pre-diabetes  no med needed at this time for discharge  monitor FS with RISS as above      Supplement hypokalemia, monitor    -GI/DVT Prophylaxis.    --------------------------------------------  Case discussed with pt, HS  Education given on findings and plan of care  ___________________________  H. MINGO Burns.  Pager: [phone]

## 2018-08-10 NOTE — PROGRESS NOTE ADULT - PROBLEM SELECTOR PLAN 1
-Patient p/w cough, fever and weakness for 1 week  -On admission: Leukocytosis, afebrile, lactate wnL, not septic  -CXR: left lung infiltrate w/ cavitation; RVP negative  -CT Chest: Cavitation to the Lt upper lobe  -Follow up -Aspergillus serology  -c/w Zosyn + Zithromax for now  -3 neg AFB sputum samples -3/3 neg  -f/u repeat Quantiferon  -ID consult: Dr Lloyd  -Pulm: Dr. Wagner

## 2018-08-10 NOTE — PROGRESS NOTE ADULT - PROBLEM SELECTOR PLAN 3
Follow up CXR  antibiotics  monitor temp and WBC  ID consult  Sputum for AFB x 3  Repiratory isolation  Quantiferon TB Gold test Follow up CXR  antibiotics  monitor temp and WBC  ID consult  Sputum for AFB x 3 neg  Repiratory isolation discontinued  Quantiferon TB Gold test

## 2018-08-10 NOTE — DIETITIAN INITIAL EVALUATION ADULT. - PROBLEM SELECTOR PLAN 2
Patient p/w left lower lobe cavitary lesion, will r/o TB, could be Staph aureus but patient not p/w high fevers. ?Fungal cavitary lesion  Follow up AFB's x 3, Quantiferon gold, Aspergillus serology, HIV   Follow up CT chest  c/w Isolation for now

## 2018-08-10 NOTE — DIETITIAN INITIAL EVALUATION ADULT. - PROBLEM SELECTOR PLAN 1
Patient p/w cough, fever and weakness for 1 week  On admission: Leukocytosis, afebrile, lactate wnL, not septic  CXR: left lung infiltrate w/ cavitation; RVP negative  Follow up CT chest for further evaluation  Follow up BCx, Sputum Culture, Aspergillus serology  c/w Rocephin + Zithromax for now  c/w Tessalon pearls + DUONEB's q6hrs  ID consult: Dr Lloyd

## 2018-08-10 NOTE — PROGRESS NOTE ADULT - PROBLEM SELECTOR PLAN 1
Most likely secondary to pneumonia however malignancy is a possibility in view of hx of smoking  Will need Bronchoscopy after TB is ruled out. Most likely secondary to pneumonia however malignancy is a possibility in view of hx of smoking  for Bronch today

## 2018-08-10 NOTE — DISCHARGE NOTE ADULT - PLAN OF CARE
Please follow up with Pulmonology, Dr. Wagner, in 1 week INCOMPLETE  You presented with a cough, and were found to have a cavitary lesion in your Left upper lung.  You were ruled out for Tuberculosis with three neg sputum cultured, and neg PPD.  You underwent a bronchoscopy significant for >>> Please continue inhaler therapy as prescribed You did not present in exacerbation and were continued on inhaler therapy.  Please continue current therapy. BP meds held due to hypotension>>>> Please continue lexapro therapy and follow up with OP psychiatry You appeared to have worsening depression during your stay and were evaluated by psychiatry, Dr. Clay, who recommended Lexapro therapy.  Please continue your current therapy, and follow up with OP psychiatry within 1 week of discharge for continued management. You presented with a cough, and were found to have a cavitary lesion in your Left upper lung.  You were ruled out for Tuberculosis with three neg sputum cultured, and neg QuantiFeron Gold.  You underwent a bronchoscopy 8/10 and results are pending. You need to follow up with Pulmonologist Dr. Wagner within a week for the results and for further follow up. Complete the antibiotic course as prescribed. Leukocytosis still present, so get repeat blood work to check wbc count after a week after finishing antibiotics. If still persistent, consider follow up with Hematologist for further follow up. BP meds held due to hypotension on admission. Currently BP low normal. Do not take Bystolic for now. Follow up with PCP within a week if discharge and consider to restart if needed. You appeared to have worsening depression during your stay and were evaluated by psychiatry, Dr. Clay, who recommended Lexapro and Lamictal therapy.  Please continue your current therapy, and follow up with OP psychiatry within 1 week of discharge for continued management.

## 2018-08-10 NOTE — DISCHARGE NOTE ADULT - PATIENT PORTAL LINK FT
You can access the [facility] Patient Portal, offered by [facility], by registering with the following website: [email or web address]

## 2018-08-10 NOTE — DISCHARGE NOTE ADULT - HOSPITAL COURSE
***********************************INCOMPLETE: Include Bronch findings**********************************************    51yo female, from home, c/o productive whitish cough, weakness and vomiting since past Saturday. Patient referred being unable to tolerate any oral intake. Patient also endorsed wheezing, subjective fever.    Pt admitted to medicine for SOB.    Pt presented with pneumonia. CXR showed left lung infiltrate w/ cavitation, RPV negative. Started on Rocephin + Zithromax, Tessalon pearls + DUONEB's q6hrs.     CT angio of the chest showed no pulmonary embolism. Evaluation of the lung parenchyma demonstrates a 8.3 x 5.3 x 5.4 cm region of consolidation in the left upper lobe with associated   cavitation. There is an air-fluid level in the largest cavitary region.   There are adjacent nodular opacities to the inferior aspect of the   consolidation within the lingula of the left upper lobe. There is no   crossing of the procedure. The right lung is clear. There is no pleural effusion or pneumothorax. The trachea and main bronchi are clear.  There are small nonspecific mediastinal and left hilar lymph nodes. The largest mediastinal lymph node is a left precarinal node measuring 1.7 cm. Hilar lymph nodes measure up to 1.4 cm. There is no significant supraclavicular, axillary or right hilar adenopathy.    Pt presented with cavitating mass of left lower lobe of lung. Pt placed in isolation for potential TB.     Pt blood cultures returned no growth. 3 AFB sputum cultures were collected all neg for acid fast bacilli seen by fluorochrome stain. The fourth culture showed numerous gram variable coccobacilli per oil power field.     Quantiferon indeterminate.  PPD neg.      Streptococcus pneumonia Antigen Serum, and legionella returned negative.     Pt also had repeated episodes of watery diarrhea. C Diff testing returned negative.    Pt underwent Bronch with Dr. Wagner sig for>>>>    Pt medically cleared for discharge. 51yo female, from home, c/o productive whitish cough, weakness and vomiting since past Saturday. Patient referred being unable to tolerate any oral intake. Patient also endorsed wheezing, subjective fever.  Pt admitted to medicine for SOB. CXR showed left lung infiltrate w/ cavitation, RPV negative. Started on Rocephin + Zithromax, Tessalon pearls + DUONEB's q6hrs. ID Dr. Lloyd evaluated and recommended to continue Zithromax and changes Rocephin to Zosyn. CT angio of the chest showed no pulmonary embolism. Evaluation of the lung parenchyma demonstrates a 8.3 x 5.3 x 5.4 cm region of consolidation in the left upper lobe with associated cavitation. There is an air-fluid level in the largest cavitary region. Pt placed in isolation for potential TB. Pt blood cultures returned no growth. 3 AFB sputum cultures were collected all neg for acid fast bacilli seen by fluorochrome stain. Quantiferon indeterminate.  PPD neg.  Streptococcus pneumonia Antigen Serum, and legionella returned negative.   Pt also had repeated episodes of watery diarrhea. C Diff testing returned negative.  Pt underwent Bronch with Dr. Wagner and results are pending, patient to follow with Dr. Wagner on discharge.   Antibiotics switched to oral Levaquin to complete the 14 day course.   Pt medically cleared for discharge. Discussed with the attending.

## 2018-08-10 NOTE — PROGRESS NOTE ADULT - SUBJECTIVE AND OBJECTIVE BOX
Meds:  ABX day # 5    azithromycin  IVPB 500 milliGRAM(s) IV Intermittent every 24 hours  Zosyn 3.375 gm IVpb q 8 hours    Allergies:  Allergies    No Known Allergies    Intolerances      ROS  [  ] UNABLE TO ELICIT    General:  [  ] None  [  ] Fever  [  ] Chills  [ x ] Malaise    Skin:  [ x ] None [  ] Rash  [  ] Wound  [  ] Ulcer    HEENT:  [ x ] None  [  ] Sore Throat  [  ] Nasal congestion/ runny nose  [  ] Photophobia [  ] Neck pain      Chest:  [  ] None   [  ] SOB  [ x ] Cough  [  ] None    Cardiovascular:   [ x ] None  [  ] CP  [  ] Palpitation    Gastrointestinal:  [  ] None  [  ] Abd pain   [ x ] Nausea    [  ] Vomiting   [  ] Diarrhea [ x ] Anorexia	     Genitourinary:  [ x ] None [  ] Polyuria   [  ] Urgency  [  ] Frequency  [  ] Dysuria    [  ]  Hematuria       Musculoskeletal:  [  ] None [  ] Back Pain	[  ] Body aches  [  ] Joint pain  [ x ] Weakness    Neurological: [  ] None [  ]Dizziness  [  ]Visual Disturbance  [  ]Headaches   [ x ] Weakness          PHYSICAL EXAM:  Vital Signs Last 24 Hrs  T(C): 36.8 (10 Aug 2018 05:20), Max: 37.4 (09 Aug 2018 20:25)  T(F): 98.3 (10 Aug 2018 05:20), Max: 99.4 (09 Aug 2018 20:25)  HR: 88 (10 Aug 2018 05:20) (87 - 103)  BP: 88/58 (10 Aug 2018 05:20) (88/58 - 117/82)  BP(mean): --  RR: 17 (10 Aug 2018 05:20) (17 - 17)  SpO2: 96% (10 Aug 2018 05:20) (96% - 96%)    Constitutional:    HEENT: [ x ] Wnl  [  ] Pharyngeal congestion    Neck:  [ x ] Supple  [  ]Lymphadenopathy  [ x ] No JVD   [  ] JVD  [  ] Masses   [  ] WNL    CHEST/Respiratory:  [ x ]Clear to auscultation  [  ] Rales   [  ] Rhonchi   [  ] Wheezing    [  ] Chest Tenderness      Cardiovascular:  [ x ] Reg S1 S2   [  ] Irreg S1 S2   [ x ]No Murmur  [  ] +ve Murmurs  [  ]Systolic [  ]Diastolic      Abdomen:  [ x ] Soft  [ x ] No tendrerness  [  ] Tenderness  [  ] Organomegaly  [  ] ABD Distention  [  ] Rigidity                       [ x ] No Regidity                       [ x ] No Rebound Tenderness  [  ] No Guarding Rigidity  [  ] Rebound Tenderness[  ] Guarding Rigidity                          [ x ]  +ve Bowel Sounds  [  ] Decreased Bowel Sounds    [  ] Absent Bowel Sounds                            Extremities: [ x ] No edema [  ] Edema  [  ] Clubbing   [  ] Cyanosis                         [ x ] No Tender Calf muscles  [  ] Tender Calf muscles                        [ x ] Palpable peripheral pulses    Neurological: [ x ] Awake  [ x ] Alert  [ x ] Oriented  x  3                           [  ] Confused  [  ] Drowsy  [  ] respond to painful stimuli  [  ] Unresponsive    Skin:  [ x ] Intact [  ] Redness [  ] Thrombophlebitis  [  ] Rashes  [  ] Dry  [  ] Ulcers    Ortho:  [  ] Joint Swelling  [  ] Joint erythema [  ] Joint tenderness                [  ] Increased temp. to touch  [  ] DJD [ x ] WNL          LABS/DIAGNOSTIC TESTS                        13.4   14.2  )-----------( 349      ( 09 Aug 2018 07:33 )             40.1   08-09    137  |  105  |  13  ----------------------------<  134<H>  3.6   |  21<L>  |  0.82    Ca    9.2      09 Aug 2018 07:33  Phos  3.2     08-09  Mg     2.4     08-09              Legionella Antigen, Urine: Negative (08.08.18 @ 23:32)  Quantiferon TB Plus: INDETERMINATE Results are indeterminate  Sedimentation Rate, Erythrocyte: 88 mm/Hr (08.06.18 @ 19:03)  C-Reactive Protein, Serum: 18.60 mg/dL (08.06.18 @ 23:31)  Rapid RVP Result: [facility]  HIV-1/2 Antigen/Antibody Screen by [facility] (08.06.18 @ 11:18)    HIV-1/2 Combo Result: Nonreact      CULTURES:   Culture - Sputum . (08.09.18 @ 17:54)    Gram Stain:   Few polymorphonuclear leukocytes per low power field  Few Squamous epithelial cells per low power field  Numerous Gram Negative Rods per oil power field  Few Yeast like cells per oil power field  Numerous Gram Positive Cocci in Pairs and Chains peroil power field    Specimen Source: .Sputum Sputum      Culture - Sputum . (08.09.18 @ 00:33)    Gram Stain:   Few Squamous epithelial cells per low power field  Few polymorphonuclear leukocytes per low power field  Numerous Gram variable coccobacilli per oil power field    Specimen Source: .Sputum Sputum      Culture - Acid Fast - Sputum w/Smear . (08.07.18 @ 17:23)    Specimen Source: .Sputum Sputum    Acid Fast Bacilli Smear:   No acid fast bacilli seen by fluorochrome stain    Culture - Acid Fast - Sputum w/Smear . (08.06.18 @ 18:25)    Specimen Source: .Sputum Sputum    Acid Fast Bacilli Smear:   No acid fast bacilli seen by fluorochrome stain    Culture - Acid Fast - Sputum w/Smear . (08.06.18 @ 18:10)    Specimen Source: .Sputum Sputum    Acid Fast Bacilli Smear:   No acid fast bacilli seen by fluorochrome stain      Culture - Blood (08.05.18 @ 22:00)    Specimen Source: .Blood Blood    Culture Results:   No growth to date.    Culture - Blood (08.05.18 @ 22:00)    Specimen Source: .Blood Blood    Culture Results:   No growth to date.      RADIOLOGY      EXAM:  CT ANGIO CHEST (W)AW IC                            PROCEDURE DATE:  08/06/2018          INTERPRETATION:  HISTORY: Shortness of breath and elevated d-dimer.?   Cavitary mass on x-ray.    TECHNIQUE:  CT pulmonary angiography was performed of the chest according   to standard institutional protocol. Coronal, sagittal and transaxial 3-D   MIP reformatted images are provided from the transaxial source data.     68mL of Omnipaque 350 was administered without complication and 32 mL was   discarded.      COMPARISON: No similar prior study available for comparison.    FINDINGS:   There is good opacification of pulmonary arterial tree. No filling defect   is present to suggest acute pulmonary embolus.    The thyroid gland is unremarkable.     Evaluation of the lung parenchyma demonstrates a 8.3 x 5.3 x 5.4 cm   region of consolidation in the left upper lobe with associated   cavitation. There is an air-fluid level in the largest cavitary region.   There are adjacent nodular opacities to the inferior aspect of the   consolidation within the lingula of the left upper lobe. There is no   crossing of the procedure. The right lung is clear. There is no pleural   effusion or pneumothorax. The trachea and main bronchi are clear.    There are small nonspecific mediastinal and left hilar lymph nodes. The   largest mediastinal lymph node is a left precarinal node measuring 1.7   cm. Hilar lymph nodes measure up to 1.4 cm. There is no significant   supraclavicular, axillary or right hilar adenopathy.    The heart size is normal. There is no pericardial effusion. The thoracic   aorta is normal in caliber without dissection.     Limited images through the upper abdomen demonstrate no acute abnormality.    The visualized osseous structures are within normal limits.     IMPRESSION:  No pulmonary embolism.    Left upper lobe cavitary mass versus cavitary pneumonia. Small   mediastinal and left hilar lymph nodes which could be reactive from   infection or metastatic if malignancy.      EXAM:  XR CHEST PORTABLE IMMED 1V                            PROCEDURE DATE:  08/05/2018          INTERPRETATION:  Chest one view    HISTORY: Shortness of breath    COMPARISON STUDY: 4/28/2017    Frontal expiratory view of the chest shows the heartto be normal in   size. The lungs show left lower lobe superior segment infiltrate with   questionable cavitation within it and there is no evidence of   pneumothorax nor pleural effusion.    IMPRESSION:  Left lung infiltrate possibly with cavitation.         Assessment and Recommendation:   51yo female, from home, c/o productive whitish cough, weakness and vomiting since past Saturday.  Patient was admitted for pneumonia and cavitary lung mass, and was started on UV Rocephin, and Azithromycin.  8/7/18 Patient is afebrile and WBC is improving.  8/8/18 Patient continue to have fever and WBC increased, Rocephin was changed to Zosyn.  8/9/18 WBC is improving, and patient is afebrile.    Problem/Recommendation - 1:  Problem: PNA (pneumonia).   Recommendation:   1- Continue IV Zosyn.  2- Continue Azithromycin.  3- Follow sputum cultures to final reports.  4- Pulmonary management, and bronchoscopy as planned.  5- F/u CXR in one week from last CXR.  6- Follow blood cultures to final reports are negative.  7- CBC and BMP follow up.    Problem/Recommendation - 2:  ·  Problem: Cavitating mass of lower lobe of left lung.    Recommendation:   1- May discontinue Airborne isolation.  2- Sputum for AFB X 3 were -VE.  3- Repeat QuantiFeron gold test.  4- Sputum for cytology.  5- Sputum for bacterial and fungal culture are noted.  6- ESR, and CRP in one week for follow up.    Problem/Recommendation - 3:  ·  Problem: Anemia.    Recommendation:   1- Closely monitor H & H.  2- Observe for bleeding.     Problem/Recommendation - 4:  ·  Problem: HTN (hypertension).    Recommendation:   1- Monitor Blood pressure closely.  2- Blood pressure control.  3- BP. meds as per cardiology and primary care team.     Discussed with medical resident.

## 2018-08-11 LAB
CULTURE RESULTS: SIGNIFICANT CHANGE UP
GAMMA INTERFERON BACKGROUND BLD IA-ACNC: 0.04 IU/ML — SIGNIFICANT CHANGE UP
M TB IFN-G BLD-IMP: NEGATIVE — SIGNIFICANT CHANGE UP
M TB IFN-G CD4+ BCKGRND COR BLD-ACNC: 0 IU/ML — SIGNIFICANT CHANGE UP
M TB IFN-G CD4+CD8+ BCKGRND COR BLD-ACNC: 0 IU/ML — SIGNIFICANT CHANGE UP
QUANT TB PLUS MITOGEN MINUS NIL: 9.7 IU/ML — SIGNIFICANT CHANGE UP
SPECIMEN SOURCE: SIGNIFICANT CHANGE UP

## 2018-08-11 RX ADMIN — ESCITALOPRAM OXALATE 5 MILLIGRAM(S): 10 TABLET, FILM COATED ORAL at 18:21

## 2018-08-11 RX ADMIN — OXYCODONE HYDROCHLORIDE 10 MILLIGRAM(S): 5 TABLET ORAL at 05:38

## 2018-08-11 RX ADMIN — LAMOTRIGINE 25 MILLIGRAM(S): 25 TABLET, ORALLY DISINTEGRATING ORAL at 17:32

## 2018-08-11 RX ADMIN — PIPERACILLIN AND TAZOBACTAM 25 GRAM(S): 4; .5 INJECTION, POWDER, LYOPHILIZED, FOR SOLUTION INTRAVENOUS at 22:08

## 2018-08-11 RX ADMIN — Medication 1 PATCH: at 11:00

## 2018-08-11 RX ADMIN — Medication 3 MILLIGRAM(S): at 22:07

## 2018-08-11 RX ADMIN — AZITHROMYCIN 250 MILLIGRAM(S): 500 TABLET, FILM COATED ORAL at 05:38

## 2018-08-11 RX ADMIN — ZOLPIDEM TARTRATE 5 MILLIGRAM(S): 10 TABLET ORAL at 00:07

## 2018-08-11 RX ADMIN — PIPERACILLIN AND TAZOBACTAM 25 GRAM(S): 4; .5 INJECTION, POWDER, LYOPHILIZED, FOR SOLUTION INTRAVENOUS at 13:58

## 2018-08-11 RX ADMIN — Medication 3 MILLILITER(S): at 10:30

## 2018-08-11 RX ADMIN — OXYCODONE HYDROCHLORIDE 10 MILLIGRAM(S): 5 TABLET ORAL at 15:30

## 2018-08-11 RX ADMIN — OXYCODONE HYDROCHLORIDE 10 MILLIGRAM(S): 5 TABLET ORAL at 14:14

## 2018-08-11 RX ADMIN — BENZOCAINE AND MENTHOL 1 LOZENGE: 5; 1 LIQUID ORAL at 13:59

## 2018-08-11 RX ADMIN — Medication 1 TABLET(S): at 17:32

## 2018-08-11 RX ADMIN — LIDOCAINE 1 PATCH: 4 CREAM TOPICAL at 10:00

## 2018-08-11 RX ADMIN — ZOLPIDEM TARTRATE 5 MILLIGRAM(S): 10 TABLET ORAL at 23:54

## 2018-08-11 RX ADMIN — Medication 15 MILLIGRAM(S): at 22:13

## 2018-08-11 RX ADMIN — CYCLOBENZAPRINE HYDROCHLORIDE 5 MILLIGRAM(S): 10 TABLET, FILM COATED ORAL at 13:59

## 2018-08-11 RX ADMIN — OXYCODONE HYDROCHLORIDE 10 MILLIGRAM(S): 5 TABLET ORAL at 22:36

## 2018-08-11 RX ADMIN — OXYCODONE HYDROCHLORIDE 10 MILLIGRAM(S): 5 TABLET ORAL at 06:14

## 2018-08-11 RX ADMIN — Medication 1 TABLET(S): at 13:57

## 2018-08-11 RX ADMIN — LIDOCAINE 1 PATCH: 4 CREAM TOPICAL at 13:58

## 2018-08-11 RX ADMIN — GABAPENTIN 200 MILLIGRAM(S): 400 CAPSULE ORAL at 22:07

## 2018-08-11 RX ADMIN — Medication 1 PATCH: at 13:57

## 2018-08-11 RX ADMIN — OXYCODONE HYDROCHLORIDE 10 MILLIGRAM(S): 5 TABLET ORAL at 22:06

## 2018-08-11 RX ADMIN — GABAPENTIN 200 MILLIGRAM(S): 400 CAPSULE ORAL at 13:57

## 2018-08-11 RX ADMIN — CYCLOBENZAPRINE HYDROCHLORIDE 5 MILLIGRAM(S): 10 TABLET, FILM COATED ORAL at 22:22

## 2018-08-11 RX ADMIN — GABAPENTIN 200 MILLIGRAM(S): 400 CAPSULE ORAL at 05:38

## 2018-08-11 RX ADMIN — Medication 100 MILLIGRAM(S): at 05:38

## 2018-08-11 RX ADMIN — Medication 100 MILLIGRAM(S): at 13:57

## 2018-08-11 RX ADMIN — CYCLOBENZAPRINE HYDROCHLORIDE 5 MILLIGRAM(S): 10 TABLET, FILM COATED ORAL at 05:39

## 2018-08-11 RX ADMIN — PIPERACILLIN AND TAZOBACTAM 25 GRAM(S): 4; .5 INJECTION, POWDER, LYOPHILIZED, FOR SOLUTION INTRAVENOUS at 05:38

## 2018-08-11 RX ADMIN — Medication 15 MILLIGRAM(S): at 22:43

## 2018-08-11 RX ADMIN — Medication 100 MILLIGRAM(S): at 22:06

## 2018-08-11 NOTE — PROGRESS NOTE ADULT - SUBJECTIVE AND OBJECTIVE BOX
Meds:  ABX day # 5    azithromycin  IVPB 500 milliGRAM(s) IV Intermittent every 24 hours  Zosyn 3.375 gm IVpb q 8 hours    Allergies:  Allergies    No Known Allergies    Intolerances      ROS  [  ] UNABLE TO ELICIT    General:  [  ] None  [  ] Fever  [  ] Chills  [ x ] Malaise    Skin:  [ x ] None [  ] Rash  [  ] Wound  [  ] Ulcer    HEENT:  [ x ] None  [  ] Sore Throat  [  ] Nasal congestion/ runny nose  [  ] Photophobia [  ] Neck pain      Chest:  [  ] None   [  ] SOB  [ x ] Cough  [  ] None    Cardiovascular:   [ x ] None  [  ] CP  [  ] Palpitation    Gastrointestinal:  [  ] None  [  ] Abd pain   [ x ] Nausea    [  ] Vomiting   [  ] Diarrhea [ x ] Anorexia	     Genitourinary:  [ x ] None [  ] Polyuria   [  ] Urgency  [  ] Frequency  [  ] Dysuria    [  ]  Hematuria       Musculoskeletal:  [  ] None [  ] Back Pain	[  ] Body aches  [  ] Joint pain  [ x ] Weakness    Neurological: [  ] None [  ]Dizziness  [  ]Visual Disturbance  [  ]Headaches   [ x ] Weakness          PHYSICAL EXAM:  Vital Signs Last 24 Hrs  T(C): 36.6 (11 Aug 2018 05:00), Max: 36.8 (10 Aug 2018 15:36)  T(F): 97.9 (11 Aug 2018 05:00), Max: 98.2 (10 Aug 2018 15:36)  HR: 74 (11 Aug 2018 05:00) (74 - 99)  BP: 111/62 (11 Aug 2018 05:00) (111/62 - 147/73)  BP(mean): 86 (10 Aug 2018 16:36) (86 - 104)  RR: 17 (11 Aug 2018 05:00) (14 - 20)  SpO2: 97% (11 Aug 2018 05:00) (96% - 100%)    Constitutional:    HEENT: [ x ] Wnl  [  ] Pharyngeal congestion    Neck:  [ x ] Supple  [  ]Lymphadenopathy  [ x ] No JVD   [  ] JVD  [  ] Masses   [  ] WNL    CHEST/Respiratory:  [ x ]Clear to auscultation  [  ] Rales   [  ] Rhonchi   [  ] Wheezing    [  ] Chest Tenderness      Cardiovascular:  [ x ] Reg S1 S2   [  ] Irreg S1 S2   [ x ]No Murmur  [  ] +ve Murmurs  [  ]Systolic [  ]Diastolic      Abdomen:  [ x ] Soft  [ x ] No tendrerness  [  ] Tenderness  [  ] Organomegaly  [  ] ABD Distention  [  ] Rigidity                       [ x ] No Regidity                       [ x ] No Rebound Tenderness  [  ] No Guarding Rigidity  [  ] Rebound Tenderness[  ] Guarding Rigidity                          [ x ]  +ve Bowel Sounds  [  ] Decreased Bowel Sounds    [  ] Absent Bowel Sounds                            Extremities: [ x ] No edema [  ] Edema  [  ] Clubbing   [  ] Cyanosis                         [ x ] No Tender Calf muscles  [  ] Tender Calf muscles                        [ x ] Palpable peripheral pulses    Neurological: [ x ] Awake  [ x ] Alert  [ x ] Oriented  x  3                           [  ] Confused  [  ] Drowsy  [  ] respond to painful stimuli  [  ] Unresponsive    Skin:  [ x ] Intact [  ] Redness [  ] Thrombophlebitis  [  ] Rashes  [  ] Dry  [  ] Ulcers    Ortho:  [  ] Joint Swelling  [  ] Joint erythema [  ] Joint tenderness                [  ] Increased temp. to touch  [  ] DJD [ x ] WNL          LABS/DIAGNOSTIC TESTS                        13.4   12.7  )-----------( 368      ( 10 Aug 2018 08:17 )             40.1   08-10    136  |  105  |  10  ----------------------------<  135<H>  3.4<L>   |  21<L>  |  0.72    Ca    9.1      10 Aug 2018 08:17  Phos  3.4     08-10  Mg     2.2     08-10            Quantiferon TB Plus: NEGATIVE  Legionella Antigen, Urine: Negative (08.08.18 @ 23:32)  Quantiferon TB Plus: INDETERMINATE Results are indeterminate  Sedimentation Rate, Erythrocyte: 88 mm/Hr (08.06.18 @ 19:03)  C-Reactive Protein, Serum: 18.60 mg/dL (08.06.18 @ 23:31)  Rapid RVP Result: [facility]  HIV-1/2 Antigen/Antibody Screen by [facility] (08.06.18 @ 11:18)    HIV-1/2 Combo Result: Nonreact      CULTURES:   Culture - Sputum . (08.09.18 @ 17:54)    Gram Stain:   Few polymorphonuclear leukocytes per low power field  Few Squamous epithelial cells per low power field  Numerous Gram Negative Rods per oil power field  Few Yeast like cells per oil power field  Numerous Gram Positive Cocci in Pairs and Chains peroil power field    Specimen Source: .Sputum Sputum      Culture - Sputum . (08.09.18 @ 00:33)    Gram Stain:   Few Squamous epithelial cells per low power field  Few polymorphonuclear leukocytes per low power field  Numerous Gram variable coccobacilli per oil power field    Specimen Source: .Sputum Sputum      Culture - Acid Fast - Sputum w/Smear . (08.07.18 @ 17:23)    Specimen Source: .Sputum Sputum    Acid Fast Bacilli Smear:   No acid fast bacilli seen by fluorochrome stain    Culture - Acid Fast - Sputum w/Smear . (08.06.18 @ 18:25)    Specimen Source: .Sputum Sputum    Acid Fast Bacilli Smear:   No acid fast bacilli seen by fluorochrome stain    Culture - Acid Fast - Sputum w/Smear . (08.06.18 @ 18:10)    Specimen Source: .Sputum Sputum    Acid Fast Bacilli Smear:   No acid fast bacilli seen by fluorochrome stain      Culture - Blood (08.05.18 @ 22:00)    Specimen Source: .Blood Blood    Culture Results:   No growth to date.    Culture - Blood (08.05.18 @ 22:00)    Specimen Source: .Blood Blood    Culture Results:   No growth to date.      RADIOLOGY:      EXAM:  XR CHEST PORTABLE IMMED 1V                            PROCEDURE DATE:  08/10/2018          INTERPRETATION:  AP semierect chest on August 10, 2018 at 8:10 PM.   Patient has left upper lobe mass.    Heart is normal for projection.    Roughly 4.6 x 4.3 cm mass in the left mid upper lung field with cavitary   center is again noted.    Chest is similar to August 8.    IMPRESSION: Unchanged left upper lobe mass.          EXAM:  CT ANGIO CHEST (W)AW IC                            PROCEDURE DATE:  08/06/2018          INTERPRETATION:  HISTORY: Shortness of breath and elevated d-dimer.?   Cavitary mass on x-ray.    TECHNIQUE:  CT pulmonary angiography was performed of the chest according   to standard institutional protocol. Coronal, sagittal and transaxial 3-D   MIP reformatted images are provided from the transaxial source data.     68mL of Omnipaque 350 was administered without complication and 32 mL was   discarded.      COMPARISON: No similar prior study available for comparison.    FINDINGS:   There is good opacification of pulmonary arterial tree. No filling defect   is present to suggest acute pulmonary embolus.    The thyroid gland is unremarkable.     Evaluation of the lung parenchyma demonstrates a 8.3 x 5.3 x 5.4 cm   region of consolidation in the left upper lobe with associated   cavitation. There is an air-fluid level in the largest cavitary region.   There are adjacent nodular opacities to the inferior aspect of the   consolidation within the lingula of the left upper lobe. There is no   crossing of the procedure. The right lung is clear. There is no pleural   effusion or pneumothorax. The trachea and main bronchi are clear.    There are small nonspecific mediastinal and left hilar lymph nodes. The   largest mediastinal lymph node is a left precarinal node measuring 1.7   cm. Hilar lymph nodes measure up to 1.4 cm. There is no significant   supraclavicular, axillary or right hilar adenopathy.    The heart size is normal. There is no pericardial effusion. The thoracic   aorta is normal in caliber without dissection.     Limited images through the upper abdomen demonstrate no acute abnormality.    The visualized osseous structures are within normal limits.     IMPRESSION:  No pulmonary embolism.    Left upper lobe cavitary mass versus cavitary pneumonia. Small   mediastinal and left hilar lymph nodes which could be reactive from   infection or metastatic if malignancy.      EXAM:  XR CHEST PORTABLE IMMED 1V                            PROCEDURE DATE:  08/05/2018          INTERPRETATION:  Chest one view    HISTORY: Shortness of breath    COMPARISON STUDY: 4/28/2017    Frontal expiratory view of the chest shows the heartto be normal in   size. The lungs show left lower lobe superior segment infiltrate with   questionable cavitation within it and there is no evidence of   pneumothorax nor pleural effusion.    IMPRESSION:  Left lung infiltrate possibly with cavitation.         Assessment and Recommendation:   51yo female, from home, c/o productive whitish cough, weakness and vomiting since past Saturday.  Patient was admitted for pneumonia and cavitary lung mass, and was started on UV Rocephin, and Azithromycin.  8/7/18 Patient is afebrile and WBC is improving.  8/8/18 Patient continue to have fever and WBC increased, Rocephin was changed to Zosyn.  8/9/18 WBC is improving, and patient is afebrile.  Repeat QuantiFeron gold test is -VE and WBC continue to improve, and CXR showed lung mass.     Problem/Recommendation - 1:  Problem: PNA (pneumonia).   Recommendation:   1- Continue IV Zosyn.  2- Discontinue Azithromycin.  3- Follow sputum cultures to final reports.  4- Pulmonary management, and follow up.  5- CBC and BMP follow up.    Problem/Recommendation - 2:  ·  Problem: Cavitating mass of lower lobe of left lung.    Recommendation:   1- May discontinue Airborne isolation.  2- Sputum for AFB X 3 were -VE.  3- Repeat QuantiFeron gold test was -VE.  4- Sputum for cytology.  5- Sputum for bacterial and fungal culture are noted.  6- ESR, and CRP in one week for follow up.    Problem/Recommendation - 3:  ·  Problem: Anemia.    Recommendation:   1- Closely monitor H & H.  2- Observe for bleeding.     Problem/Recommendation - 4:  ·  Problem: HTN (hypertension).    Recommendation:   1- Monitor Blood pressure closely.  2- Blood pressure control.  3- BP. meds as per cardiology and primary care team.     Discussed with medical resident.

## 2018-08-11 NOTE — PROGRESS NOTE ADULT - SUBJECTIVE AND OBJECTIVE BOX
_________________________________________________________________________________________  ========>>  M E D I C A L   A T T E N D I N G    F O L L O W  U P  N O T E  <<=========  -----------------------------------------------------------------------------------------------------    - Patient seen and examined by me approximately sixty minutes ago.   - In summary,  JOSE MAYER is a 52y year old woman who originally presented with cough, SOB  - Patient today overall doing ok, comfortable, eating poorly / poor appetite     ==================>> REVIEW OF SYSTEM <<=================    GEN: no fever, no chills, + multilevel pain including chronic back and some neck pain, stable  RESP: no SOB at rest,  +cough and little sputum  CVS: no chest pain, no palpitations, no edema  GI: no abdominal pain, no nausea, no diarrhea   : no dysuria, no frequency, no hematuria  Neuro: no headache, no dizziness  Derm : no itching, no rash     + feels depressed and anxious due to issues with children at home... ( son is now in [facility] hospital and safe)     ==================>> PHYSICAL EXAM <<=================    GEN: A&O X 3 , NAD , comfortable  HEENT: NCAT, PERRL, MMM, hearing intact  Neck: supple , no JVD  CVS: S1S2 , regular , No M/R/G appreciated  PULM: no significant wheezing or rhonchi noted   ABD.: soft. non tender, non distended,  bowel sounds present  Extrem: intact pulses , no edema   PSYCH : flat affect         ==================>> MEDICATIONS <<====================    ALBUTerol    90 MICROgram(s) HFA Inhaler 1 Puff(s) Inhalation every 4 hours  ALBUTerol/ipratropium for Nebulization 3 milliLiter(s) Nebulizer every 6 hours  azithromycin  IVPB      azithromycin  IVPB 500 milliGRAM(s) IV Intermittent every 24 hours  benzonatate 100 milliGRAM(s) Oral three times a day  escitalopram 5 milliGRAM(s) Oral daily  gabapentin 200 milliGRAM(s) Oral every 8 hours  lactobacillus acidophilus 1 Tablet(s) Oral three times a day with meals  lamoTRIgine 25 milliGRAM(s) Oral two times a day  lidocaine   Patch 1 Patch Transdermal daily  melatonin 3 milliGRAM(s) Oral at bedtime  nicotine - 21 mG/24Hr(s) Patch 1 patch Transdermal daily  piperacillin/tazobactam IVPB. 3.375 Gram(s) IV Intermittent every 8 hours  tiotropium 18 MICROgram(s) Capsule 1 Capsule(s) Inhalation daily    MEDICATIONS  (PRN):  acetaminophen   Tablet 650 milliGRAM(s) Oral every 6 hours PRN For Temp greater than 38 C (100.4 F)  acetaminophen   Tablet. 650 milliGRAM(s) Oral once PRN pain  benzocaine 15 mG/menthol 3.6 mG Lozenge 1 Lozenge Oral every 2 hours PRN Sore Throat  cyclobenzaprine 5 milliGRAM(s) Oral three times a day PRN Muscle Spasm  ketorolac   Injectable 15 milliGRAM(s) IV Push every 6 hours PRN Moderate Pain (4 - 6)  oxyCODONE    IR 10 milliGRAM(s) Oral every 6 hours PRN Severe Pain (7 - 10)  zolpidem 5 milliGRAM(s) Oral at bedtime PRN Insomnia    ==================>> VITAL SIGNS <<==================    Vital Signs Last 24 Hrs  T(C): 36.6 (08-11-18 @ 05:00)  T(F): 97.9 (08-11-18 @ 05:00), Max: 98.2 (08-10-18 @ 15:36)  HR: 74 (08-11-18 @ 05:00) (74 - 99)  BP: 111/62 (08-11-18 @ 05:00)  BP(mean): 86 (08-10-18 @ 16:36) (86 - 104)  RR: 17 (08-11-18 @ 05:00) (14 - 20)  SpO2: 97% (08-11-18 @ 05:00) (96% - 100%)       ==================>> LAB AND IMAGING <<==================                        13.4   12.7  )-----------( 368      ( 10 Aug 2018 08:17 )             40.1        08-10    136  |  105  |  10  ----------------------------<  135<H>  3.4<L>   |  21<L>  |  0.72    Ca    9.1      10 Aug 2018 08:17  Phos  3.4     08-10  Mg     2.2     08-10    ___________________________________________________________________________________  ===============>>  A S S E S S M E N T   A N D   P L A N <<===============  ------------------------------------------------------------------------------------------    · Assessment		  53yo female, from home, c/o productive whitish cough, weakness and vomiting since past Saturday.    Patient being admitted for pNA with cavitary lesion, concerns for TB vs Fungal infx.    Problem/Plan - 1:  ·  Problem: PNA with left lung infiltrate w/ cavitation  -c/w IV abx per ID >> ?? ORAL OPTIONS ? ( pt anxious to go home given above issues)   - post bronch , pending results  / report   -ID and  Pulm appreciated      Dispo planing pending Bronch and clearance for DC on Oral abx?    Problem/Plan - 2:  ·  Problem: chronic back pain   appreciated pain mgmt  continue meds as increased.    Problem/Plan - 3:  ·  Problem: HTN   - Continue Current medications as above  -Monitor BP and adjust medication if clinically indicated.  supplement hypokalemia and monitor     Problem/Plan - 4:  ·  Problem: Pre-diabetes  no med needed at this time for discharge  monitor FS with RISS as above    Supplement hypokalemia, monitor/ repeat     -GI/DVT Prophylaxis.    --------------------------------------------  Case discussed with pt  Education given on findings and plan of care  ___________________________  NED Burns D.O.  Pager: [phone]

## 2018-08-11 NOTE — PROGRESS NOTE ADULT - PROBLEM SELECTOR PLAN 3
Follow up CXR  antibiotics  monitor temp and WBC  ID consult  Sputum for AFB x 3 neg  Repiratory isolation discontinued  Quantiferon TB Gold test.

## 2018-08-11 NOTE — PROGRESS NOTE ADULT - PROBLEM SELECTOR PLAN 1
Most likely secondary to pneumonia however malignancy is a possibility in view of hx of smoking  for Bronch today. Most likely secondary to pneumonia however malignancy is a possibility in view of hx of smoking  Bronch done yesterday Most likely secondary to pneumonia however malignancy is a possibility in view of hx of smoking  Bronch done yesterday  if biopsy negative then will need TS for open lung biopsy via VATS if mass persits despite antibiotics therapy

## 2018-08-11 NOTE — PROGRESS NOTE ADULT - SUBJECTIVE AND OBJECTIVE BOX
Patient is a 52y old  Female who presents with a chief complaint of SOB (10 Aug 2018 11:25)  Awake, alert, comfortable in bed in NAD.  Pain in neck and upper back improved. Has hx of smoking and has been told to have Copd in the past. Feeling better. Sputum for AFB x 3 neg. Scheduled for Bronch today. Pt has no new complaints. Being evaluated by Psych because of depression.    INTERVAL HPI/OVERNIGHT EVENTS:      VITAL SIGNS:  T(F): 97.9 (08-11-18 @ 05:00)  HR: 74 (08-11-18 @ 05:00)  BP: 111/62 (08-11-18 @ 05:00)  RR: 17 (08-11-18 @ 05:00)  SpO2: 97% (08-11-18 @ 05:00)  Wt(kg): --  I&O's Detail          REVIEW OF SYSTEMS:    CONSTITUTIONAL:  No fevers, chills, sweats    HEENT:  Eyes:  No diplopia or blurred vision. ENT:  No earache, sore throat or runny nose.    CARDIOVASCULAR:  No pressure, squeezing, tightness, or heaviness about the chest; no palpitations.    RESPIRATORY:  Per HPI    GASTROINTESTINAL:  No abdominal pain, nausea, vomiting or diarrhea.    GENITOURINARY:  No dysuria, frequency or urgency.    NEUROLOGIC:  No paresthesias, fasciculations, seizures or weakness.    PSYCHIATRIC:  No disorder of thought or mood.      PHYSICAL EXAM:    Constitutional: Well developed and nourished  Eyes:Perrla  ENMT: normal  Neck:supple  Respiratory: + Rales on left upper back  Cardiovascular: S1 S2 regular  Gastrointestinal: Soft, Non tender  Extremities: No edema  Vascular:normal  Neurological:Awake, alert,Ox3  Musculoskeletal:Normal      MEDICATIONS  (STANDING):  ALBUTerol    90 MICROgram(s) HFA Inhaler 1 Puff(s) Inhalation every 4 hours  ALBUTerol/ipratropium for Nebulization 3 milliLiter(s) Nebulizer every 6 hours  azithromycin  IVPB      azithromycin  IVPB 500 milliGRAM(s) IV Intermittent every 24 hours  benzonatate 100 milliGRAM(s) Oral three times a day  escitalopram 5 milliGRAM(s) Oral daily  gabapentin 200 milliGRAM(s) Oral every 8 hours  lactobacillus acidophilus 1 Tablet(s) Oral three times a day with meals  lamoTRIgine 25 milliGRAM(s) Oral two times a day  lidocaine   Patch 1 Patch Transdermal daily  melatonin 3 milliGRAM(s) Oral at bedtime  nicotine - 21 mG/24Hr(s) Patch 1 patch Transdermal daily  piperacillin/tazobactam IVPB. 3.375 Gram(s) IV Intermittent every 8 hours  tiotropium 18 MICROgram(s) Capsule 1 Capsule(s) Inhalation daily    MEDICATIONS  (PRN):  acetaminophen   Tablet 650 milliGRAM(s) Oral every 6 hours PRN For Temp greater than 38 C (100.4 F)  acetaminophen   Tablet. 650 milliGRAM(s) Oral once PRN pain  benzocaine 15 mG/menthol 3.6 mG Lozenge 1 Lozenge Oral every 2 hours PRN Sore Throat  cyclobenzaprine 5 milliGRAM(s) Oral three times a day PRN Muscle Spasm  ketorolac   Injectable 15 milliGRAM(s) IV Push every 6 hours PRN Moderate Pain (4 - 6)  oxyCODONE    IR 10 milliGRAM(s) Oral every 6 hours PRN Severe Pain (7 - 10)  zolpidem 5 milliGRAM(s) Oral at bedtime PRN Insomnia      Allergies    No Known Allergies    Intolerances        LABS:                        13.4   12.7  )-----------( 368      ( 10 Aug 2018 08:17 )             40.1     08-10    136  |  105  |  10  ----------------------------<  135<H>  3.4<L>   |  21<L>  |  0.72    Ca    9.1      10 Aug 2018 08:17  Phos  3.4     08-10  Mg     2.2     08-10      PT/INR - ( 10 Aug 2018 08:17 )   PT: 14.3 sec;   INR: 1.30 ratio         PTT - ( 10 Aug 2018 08:17 )  PTT:31.0 sec          CAPILLARY BLOOD GLUCOSE        pro-bnp 191 08-05 @ 16:33     d-dimer 574  08-05 @ 16:33      RADIOLOGY & ADDITIONAL TESTS:    CXR:  < from: Xray Chest 1 View-PORTABLE IMMEDIATE (08.10.18 @ 20:24) >  Unchanged left upper lobe mass.    < end of copied text >    Ct scan chest:  < from: CT Angio Chest w/ IV Cont (08.06.18 @ 00:24) >  No pulmonary embolism.    Left upper lobe cavitary mass versus cavitary pneumonia. Small   mediastinal and left hilar lymph nodes which could be reactive from   infection or metastatic if malignancy.    < end of copied text >    ekg;  < from: 12 Lead ECG (08.06.18 @ 20:44) >  Ventricular Rate 74 BPM    Atrial Rate 74 BPM    P-R Interval 136 ms    QRS Duration 74 ms     ms    QTc 452 ms    P Axis 57 degrees    R Axis 24 degrees    T Axis 34 degrees    Diagnosis Line Normal sinus rhythm  Normal ECG  Confirmed by BASIM RAMÍREZ, HARLEY ([id number]) on 08-Aug-2018 17:20:07    < end of copied text >    echo: Patient is a 52y old  Female who presents with a chief complaint of SOB (10 Aug 2018 11:25)  Awake, alert, comfortable in bed in NAD.  Pain in neck and upper back improved. Has hx of smoking and has been told to have Copd in the past. Feeling better. Sputum for AFB x 3 neg. S/p Bronchoscopy with biopsy yesterday.  Pt has no new complaints. Being evaluated by Psych because of depression.    INTERVAL HPI/OVERNIGHT EVENTS:      VITAL SIGNS:  T(F): 97.9 (08-11-18 @ 05:00)  HR: 74 (08-11-18 @ 05:00)  BP: 111/62 (08-11-18 @ 05:00)  RR: 17 (08-11-18 @ 05:00)  SpO2: 97% (08-11-18 @ 05:00)  Wt(kg): --  I&O's Detail          REVIEW OF SYSTEMS:    CONSTITUTIONAL:  No fevers, chills, sweats    HEENT:  Eyes:  No diplopia or blurred vision. ENT:  No earache, sore throat or runny nose.    CARDIOVASCULAR:  No pressure, squeezing, tightness, or heaviness about the chest; no palpitations.    RESPIRATORY:  Per HPI    GASTROINTESTINAL:  No abdominal pain, nausea, vomiting or diarrhea.    GENITOURINARY:  No dysuria, frequency or urgency.    NEUROLOGIC:  No paresthesias, fasciculations, seizures or weakness.    PSYCHIATRIC:  No disorder of thought or mood.      PHYSICAL EXAM:    Constitutional: Well developed and nourished  Eyes:Perrla  ENMT: normal  Neck:supple  Respiratory: + Rales on left upper back  Cardiovascular: S1 S2 regular  Gastrointestinal: Soft, Non tender  Extremities: No edema  Vascular:normal  Neurological:Awake, alert,Ox3  Musculoskeletal:Normal      MEDICATIONS  (STANDING):  ALBUTerol    90 MICROgram(s) HFA Inhaler 1 Puff(s) Inhalation every 4 hours  ALBUTerol/ipratropium for Nebulization 3 milliLiter(s) Nebulizer every 6 hours  azithromycin  IVPB      azithromycin  IVPB 500 milliGRAM(s) IV Intermittent every 24 hours  benzonatate 100 milliGRAM(s) Oral three times a day  escitalopram 5 milliGRAM(s) Oral daily  gabapentin 200 milliGRAM(s) Oral every 8 hours  lactobacillus acidophilus 1 Tablet(s) Oral three times a day with meals  lamoTRIgine 25 milliGRAM(s) Oral two times a day  lidocaine   Patch 1 Patch Transdermal daily  melatonin 3 milliGRAM(s) Oral at bedtime  nicotine - 21 mG/24Hr(s) Patch 1 patch Transdermal daily  piperacillin/tazobactam IVPB. 3.375 Gram(s) IV Intermittent every 8 hours  tiotropium 18 MICROgram(s) Capsule 1 Capsule(s) Inhalation daily    MEDICATIONS  (PRN):  acetaminophen   Tablet 650 milliGRAM(s) Oral every 6 hours PRN For Temp greater than 38 C (100.4 F)  acetaminophen   Tablet. 650 milliGRAM(s) Oral once PRN pain  benzocaine 15 mG/menthol 3.6 mG Lozenge 1 Lozenge Oral every 2 hours PRN Sore Throat  cyclobenzaprine 5 milliGRAM(s) Oral three times a day PRN Muscle Spasm  ketorolac   Injectable 15 milliGRAM(s) IV Push every 6 hours PRN Moderate Pain (4 - 6)  oxyCODONE    IR 10 milliGRAM(s) Oral every 6 hours PRN Severe Pain (7 - 10)  zolpidem 5 milliGRAM(s) Oral at bedtime PRN Insomnia      Allergies    No Known Allergies    Intolerances        LABS:                        13.4   12.7  )-----------( 368      ( 10 Aug 2018 08:17 )             40.1     08-10    136  |  105  |  10  ----------------------------<  135<H>  3.4<L>   |  21<L>  |  0.72    Ca    9.1      10 Aug 2018 08:17  Phos  3.4     08-10  Mg     2.2     08-10      PT/INR - ( 10 Aug 2018 08:17 )   PT: 14.3 sec;   INR: 1.30 ratio         PTT - ( 10 Aug 2018 08:17 )  PTT:31.0 sec          CAPILLARY BLOOD GLUCOSE        pro-bnp 191 08-05 @ 16:33     d-dimer 574  08-05 @ 16:33      RADIOLOGY & ADDITIONAL TESTS:    CXR:  < from: Xray Chest 1 View-PORTABLE IMMEDIATE (08.10.18 @ 20:24) >  Unchanged left upper lobe mass.    < end of copied text >    Ct scan chest:  < from: CT Angio Chest w/ IV Cont (08.06.18 @ 00:24) >  No pulmonary embolism.    Left upper lobe cavitary mass versus cavitary pneumonia. Small   mediastinal and left hilar lymph nodes which could be reactive from   infection or metastatic if malignancy.    < end of copied text >    ekg;  < from: 12 Lead ECG (08.06.18 @ 20:44) >  Ventricular Rate 74 BPM    Atrial Rate 74 BPM    P-R Interval 136 ms    QRS Duration 74 ms     ms    QTc 452 ms    P Axis 57 degrees    R Axis 24 degrees    T Axis 34 degrees    Diagnosis Line Normal sinus rhythm  Normal ECG  Confirmed by BASIM RAMÍREZ, HARLEY ([id number]) on 08-Aug-2018 17:20:07    < end of copied text >    echo:

## 2018-08-12 LAB
ANION GAP SERPL CALC-SCNC: 12 MMOL/L — SIGNIFICANT CHANGE UP (ref 5–17)
BUN SERPL-MCNC: 18 MG/DL — SIGNIFICANT CHANGE UP (ref 7–18)
CALCIUM SERPL-MCNC: 8.8 MG/DL — SIGNIFICANT CHANGE UP (ref 8.4–10.5)
CHLORIDE SERPL-SCNC: 107 MMOL/L — SIGNIFICANT CHANGE UP (ref 96–108)
CO2 SERPL-SCNC: 22 MMOL/L — SIGNIFICANT CHANGE UP (ref 22–31)
CREAT SERPL-MCNC: 0.95 MG/DL — SIGNIFICANT CHANGE UP (ref 0.5–1.3)
GLUCOSE SERPL-MCNC: 129 MG/DL — HIGH (ref 70–99)
MAGNESIUM SERPL-MCNC: 2.3 MG/DL — SIGNIFICANT CHANGE UP (ref 1.6–2.6)
POTASSIUM SERPL-MCNC: 3.6 MMOL/L — SIGNIFICANT CHANGE UP (ref 3.5–5.3)
POTASSIUM SERPL-SCNC: 3.6 MMOL/L — SIGNIFICANT CHANGE UP (ref 3.5–5.3)
SODIUM SERPL-SCNC: 141 MMOL/L — SIGNIFICANT CHANGE UP (ref 135–145)

## 2018-08-12 RX ORDER — OXYCODONE HYDROCHLORIDE 5 MG/1
20 TABLET ORAL EVERY 4 HOURS
Qty: 0 | Refills: 0 | Status: DISCONTINUED | OUTPATIENT
Start: 2018-08-12 | End: 2018-08-13

## 2018-08-12 RX ADMIN — Medication 1 TABLET(S): at 18:17

## 2018-08-12 RX ADMIN — OXYCODONE HYDROCHLORIDE 20 MILLIGRAM(S): 5 TABLET ORAL at 22:32

## 2018-08-12 RX ADMIN — Medication 1 PATCH: at 11:11

## 2018-08-12 RX ADMIN — Medication 100 MILLIGRAM(S): at 13:15

## 2018-08-12 RX ADMIN — LIDOCAINE 1 PATCH: 4 CREAM TOPICAL at 02:02

## 2018-08-12 RX ADMIN — GABAPENTIN 200 MILLIGRAM(S): 400 CAPSULE ORAL at 13:10

## 2018-08-12 RX ADMIN — CYCLOBENZAPRINE HYDROCHLORIDE 5 MILLIGRAM(S): 10 TABLET, FILM COATED ORAL at 05:45

## 2018-08-12 RX ADMIN — Medication 1 PATCH: at 11:17

## 2018-08-12 RX ADMIN — Medication 1 TABLET(S): at 08:33

## 2018-08-12 RX ADMIN — Medication 3 MILLIGRAM(S): at 21:22

## 2018-08-12 RX ADMIN — OXYCODONE HYDROCHLORIDE 20 MILLIGRAM(S): 5 TABLET ORAL at 19:10

## 2018-08-12 RX ADMIN — LAMOTRIGINE 25 MILLIGRAM(S): 25 TABLET, ORALLY DISINTEGRATING ORAL at 05:45

## 2018-08-12 RX ADMIN — LIDOCAINE 1 PATCH: 4 CREAM TOPICAL at 11:11

## 2018-08-12 RX ADMIN — CYCLOBENZAPRINE HYDROCHLORIDE 5 MILLIGRAM(S): 10 TABLET, FILM COATED ORAL at 21:21

## 2018-08-12 RX ADMIN — Medication 100 MILLIGRAM(S): at 07:05

## 2018-08-12 RX ADMIN — Medication 1 TABLET(S): at 11:16

## 2018-08-12 RX ADMIN — OXYCODONE HYDROCHLORIDE 10 MILLIGRAM(S): 5 TABLET ORAL at 06:14

## 2018-08-12 RX ADMIN — LAMOTRIGINE 25 MILLIGRAM(S): 25 TABLET, ORALLY DISINTEGRATING ORAL at 18:17

## 2018-08-12 RX ADMIN — OXYCODONE HYDROCHLORIDE 10 MILLIGRAM(S): 5 TABLET ORAL at 05:44

## 2018-08-12 RX ADMIN — PIPERACILLIN AND TAZOBACTAM 25 GRAM(S): 4; .5 INJECTION, POWDER, LYOPHILIZED, FOR SOLUTION INTRAVENOUS at 21:22

## 2018-08-12 RX ADMIN — PIPERACILLIN AND TAZOBACTAM 25 GRAM(S): 4; .5 INJECTION, POWDER, LYOPHILIZED, FOR SOLUTION INTRAVENOUS at 05:44

## 2018-08-12 RX ADMIN — Medication 15 MILLIGRAM(S): at 05:44

## 2018-08-12 RX ADMIN — Medication 15 MILLIGRAM(S): at 21:22

## 2018-08-12 RX ADMIN — ESCITALOPRAM OXALATE 5 MILLIGRAM(S): 10 TABLET, FILM COATED ORAL at 11:12

## 2018-08-12 RX ADMIN — Medication 100 MILLIGRAM(S): at 21:21

## 2018-08-12 RX ADMIN — Medication 3 MILLILITER(S): at 14:42

## 2018-08-12 RX ADMIN — BENZOCAINE AND MENTHOL 1 LOZENGE: 5; 1 LIQUID ORAL at 13:10

## 2018-08-12 RX ADMIN — Medication 15 MILLIGRAM(S): at 06:14

## 2018-08-12 RX ADMIN — GABAPENTIN 200 MILLIGRAM(S): 400 CAPSULE ORAL at 21:21

## 2018-08-12 RX ADMIN — OXYCODONE HYDROCHLORIDE 20 MILLIGRAM(S): 5 TABLET ORAL at 23:02

## 2018-08-12 RX ADMIN — OXYCODONE HYDROCHLORIDE 20 MILLIGRAM(S): 5 TABLET ORAL at 11:20

## 2018-08-12 RX ADMIN — OXYCODONE HYDROCHLORIDE 20 MILLIGRAM(S): 5 TABLET ORAL at 12:52

## 2018-08-12 RX ADMIN — AZITHROMYCIN 250 MILLIGRAM(S): 500 TABLET, FILM COATED ORAL at 05:44

## 2018-08-12 RX ADMIN — ZOLPIDEM TARTRATE 5 MILLIGRAM(S): 10 TABLET ORAL at 21:21

## 2018-08-12 RX ADMIN — OXYCODONE HYDROCHLORIDE 20 MILLIGRAM(S): 5 TABLET ORAL at 18:23

## 2018-08-12 RX ADMIN — Medication 3 MILLILITER(S): at 21:15

## 2018-08-12 RX ADMIN — PIPERACILLIN AND TAZOBACTAM 25 GRAM(S): 4; .5 INJECTION, POWDER, LYOPHILIZED, FOR SOLUTION INTRAVENOUS at 13:09

## 2018-08-12 RX ADMIN — CYCLOBENZAPRINE HYDROCHLORIDE 5 MILLIGRAM(S): 10 TABLET, FILM COATED ORAL at 14:54

## 2018-08-12 RX ADMIN — Medication 15 MILLIGRAM(S): at 21:52

## 2018-08-12 RX ADMIN — Medication 3 MILLILITER(S): at 08:37

## 2018-08-12 RX ADMIN — GABAPENTIN 200 MILLIGRAM(S): 400 CAPSULE ORAL at 05:45

## 2018-08-12 NOTE — PROGRESS NOTE ADULT - SUBJECTIVE AND OBJECTIVE BOX
Patient is a 52y old  Female who presents with a chief complaint of SOB (10 Aug 2018 11:25)    Awake, alert, comfortable in bed in NAD.  Pain in neck and upper back improved. Has hx of smoking and has been told to have Copd in the past. Feeling better. Sputum for AFB x 3 neg. S/p Bronchoscopy with biopsy. Being evaluated by Psych because of depression.    INTERVAL HPI/OVERNIGHT EVENTS:      VITAL SIGNS:  T(F): 97.5 (08-12-18 @ 05:00)  HR: 67 (08-12-18 @ 05:00)  BP: 96/62 (08-12-18 @ 05:00)  RR: 17 (08-12-18 @ 05:00)  SpO2: 96% (08-12-18 @ 05:00)  Wt(kg): --  I&O's Detail          REVIEW OF SYSTEMS:    CONSTITUTIONAL:  No fevers, chills, sweats    HEENT:  Eyes:  No diplopia or blurred vision. ENT:  No earache, sore throat or runny nose.    CARDIOVASCULAR:  No pressure, squeezing, tightness, or heaviness about the chest; no palpitations.    RESPIRATORY:  Per HPI    GASTROINTESTINAL:  No abdominal pain, nausea, vomiting or diarrhea.    GENITOURINARY:  No dysuria, frequency or urgency.    NEUROLOGIC:  No paresthesias, fasciculations, seizures or weakness.    PSYCHIATRIC:  No disorder of thought or mood.      PHYSICAL EXAM:    Constitutional: Well developed and nourished  Eyes:Perrla  ENMT: normal  Neck:supple  Respiratory: + Rales on left upper back  Cardiovascular: S1 S2 regular  Gastrointestinal: Soft, Non tender  Extremities: No edema  Vascular:normal  Neurological:Awake, alert,Ox3  Musculoskeletal:Normal      MEDICATIONS  (STANDING):  ALBUTerol    90 MICROgram(s) HFA Inhaler 1 Puff(s) Inhalation every 4 hours  ALBUTerol/ipratropium for Nebulization 3 milliLiter(s) Nebulizer every 6 hours  azithromycin  IVPB      azithromycin  IVPB 500 milliGRAM(s) IV Intermittent every 24 hours  benzonatate 100 milliGRAM(s) Oral three times a day  escitalopram 5 milliGRAM(s) Oral daily  gabapentin 200 milliGRAM(s) Oral every 8 hours  lactobacillus acidophilus 1 Tablet(s) Oral three times a day with meals  lamoTRIgine 25 milliGRAM(s) Oral two times a day  lidocaine   Patch 1 Patch Transdermal daily  melatonin 3 milliGRAM(s) Oral at bedtime  nicotine - 21 mG/24Hr(s) Patch 1 patch Transdermal daily  piperacillin/tazobactam IVPB. 3.375 Gram(s) IV Intermittent every 8 hours  tiotropium 18 MICROgram(s) Capsule 1 Capsule(s) Inhalation daily    MEDICATIONS  (PRN):  acetaminophen   Tablet 650 milliGRAM(s) Oral every 6 hours PRN For Temp greater than 38 C (100.4 F)  acetaminophen   Tablet. 650 milliGRAM(s) Oral once PRN pain  benzocaine 15 mG/menthol 3.6 mG Lozenge 1 Lozenge Oral every 2 hours PRN Sore Throat  cyclobenzaprine 5 milliGRAM(s) Oral three times a day PRN Muscle Spasm  ketorolac   Injectable 15 milliGRAM(s) IV Push every 6 hours PRN Moderate Pain (4 - 6)  oxyCODONE    IR 20 milliGRAM(s) Oral every 4 hours PRN Moderate Pain (4 - 6)  zolpidem 5 milliGRAM(s) Oral at bedtime PRN Insomnia      Allergies    No Known Allergies    Intolerances        LABS:    08-12    141  |  107  |  18  ----------------------------<  129<H>  3.6   |  22  |  0.95    Ca    8.8      12 Aug 2018 07:32  Mg     2.3     08-12                CAPILLARY BLOOD GLUCOSE        pro-bnp 191 08-05 @ 16:33     d-dimer 574  08-05 @ 16:33      RADIOLOGY & ADDITIONAL TESTS:    CXR:  < from: Xray Chest 1 View-PORTABLE IMMEDIATE (08.10.18 @ 20:24) >    IMPRESSION: Unchanged left upper lobe mass.      < end of copied text >    Ct scan chest:  < from: CT Angio Chest w/ IV Cont (08.06.18 @ 00:24) >  IMPRESSION:  No pulmonary embolism.    Left upper lobe cavitary mass versus cavitary pneumonia. Small   mediastinal and left hilar lymph nodes which could be reactive from   infection or metastatic if malignancy.    < end of copied text >    ekg;  < from: 12 Lead ECG (08.06.18 @ 20:44) >  Ventricular Rate 74 BPM    Atrial Rate 74 BPM    P-R Interval 136 ms    QRS Duration 74 ms     ms    QTc 452 ms    P Axis 57 degrees    R Axis 24 degrees    T Axis 34 degrees    Diagnosis Line Normal sinus rhythm  Normal ECG  Confirmed by BASIM RAMÍREZ, HARLEY ([id number]) on 08-Aug-2018 17:20:07    < end of copied text >    echo: Patient is a 52y old  Female who presents with a chief complaint of SOB (10 Aug 2018 11:25)    Awake, alert, comfortable in bed in NAD.  Pain in neck and upper back improved. Has hx of smoking and has been told to have Copd in the past. Feeling better. Sputum for AFB x 3 neg. S/p Bronchoscopy with biopsy. Path report pending. Clinically improved.    INTERVAL HPI/OVERNIGHT EVENTS:      VITAL SIGNS:  T(F): 97.5 (08-12-18 @ 05:00)  HR: 67 (08-12-18 @ 05:00)  BP: 96/62 (08-12-18 @ 05:00)  RR: 17 (08-12-18 @ 05:00)  SpO2: 96% (08-12-18 @ 05:00)  Wt(kg): --  I&O's Detail          REVIEW OF SYSTEMS:    CONSTITUTIONAL:  No fevers, chills, sweats    HEENT:  Eyes:  No diplopia or blurred vision. ENT:  No earache, sore throat or runny nose.    CARDIOVASCULAR:  No pressure, squeezing, tightness, or heaviness about the chest; no palpitations.    RESPIRATORY:  Per HPI    GASTROINTESTINAL:  No abdominal pain, nausea, vomiting or diarrhea.    GENITOURINARY:  No dysuria, frequency or urgency.    NEUROLOGIC:  No paresthesias, fasciculations, seizures or weakness.    PSYCHIATRIC:  No disorder of thought or mood.      PHYSICAL EXAM:    Constitutional: Well developed and nourished  Eyes:Perrla  ENMT: normal  Neck:supple  Respiratory: + Rales on left upper back  Cardiovascular: S1 S2 regular  Gastrointestinal: Soft, Non tender  Extremities: No edema  Vascular:normal  Neurological:Awake, alert,Ox3  Musculoskeletal:Normal      MEDICATIONS  (STANDING):  ALBUTerol    90 MICROgram(s) HFA Inhaler 1 Puff(s) Inhalation every 4 hours  ALBUTerol/ipratropium for Nebulization 3 milliLiter(s) Nebulizer every 6 hours  azithromycin  IVPB      azithromycin  IVPB 500 milliGRAM(s) IV Intermittent every 24 hours  benzonatate 100 milliGRAM(s) Oral three times a day  escitalopram 5 milliGRAM(s) Oral daily  gabapentin 200 milliGRAM(s) Oral every 8 hours  lactobacillus acidophilus 1 Tablet(s) Oral three times a day with meals  lamoTRIgine 25 milliGRAM(s) Oral two times a day  lidocaine   Patch 1 Patch Transdermal daily  melatonin 3 milliGRAM(s) Oral at bedtime  nicotine - 21 mG/24Hr(s) Patch 1 patch Transdermal daily  piperacillin/tazobactam IVPB. 3.375 Gram(s) IV Intermittent every 8 hours  tiotropium 18 MICROgram(s) Capsule 1 Capsule(s) Inhalation daily    MEDICATIONS  (PRN):  acetaminophen   Tablet 650 milliGRAM(s) Oral every 6 hours PRN For Temp greater than 38 C (100.4 F)  acetaminophen   Tablet. 650 milliGRAM(s) Oral once PRN pain  benzocaine 15 mG/menthol 3.6 mG Lozenge 1 Lozenge Oral every 2 hours PRN Sore Throat  cyclobenzaprine 5 milliGRAM(s) Oral three times a day PRN Muscle Spasm  ketorolac   Injectable 15 milliGRAM(s) IV Push every 6 hours PRN Moderate Pain (4 - 6)  oxyCODONE    IR 20 milliGRAM(s) Oral every 4 hours PRN Moderate Pain (4 - 6)  zolpidem 5 milliGRAM(s) Oral at bedtime PRN Insomnia      Allergies    No Known Allergies    Intolerances        LABS:    08-12    141  |  107  |  18  ----------------------------<  129<H>  3.6   |  22  |  0.95    Ca    8.8      12 Aug 2018 07:32  Mg     2.3     08-12                CAPILLARY BLOOD GLUCOSE        pro-bnp 191 08-05 @ 16:33     d-dimer 574  08-05 @ 16:33      RADIOLOGY & ADDITIONAL TESTS:    CXR:  < from: Xray Chest 1 View-PORTABLE IMMEDIATE (08.10.18 @ 20:24) >    IMPRESSION: Unchanged left upper lobe mass.      < end of copied text >    Ct scan chest:  < from: CT Angio Chest w/ IV Cont (08.06.18 @ 00:24) >  IMPRESSION:  No pulmonary embolism.    Left upper lobe cavitary mass versus cavitary pneumonia. Small   mediastinal and left hilar lymph nodes which could be reactive from   infection or metastatic if malignancy.    < end of copied text >    ekg;  < from: 12 Lead ECG (08.06.18 @ 20:44) >  Ventricular Rate 74 BPM    Atrial Rate 74 BPM    P-R Interval 136 ms    QRS Duration 74 ms     ms    QTc 452 ms    P Axis 57 degrees    R Axis 24 degrees    T Axis 34 degrees    Diagnosis Line Normal sinus rhythm  Normal ECG  Confirmed by BASIM RAMÍREZ, HARLEY ([id number]) on 08-Aug-2018 17:20:07    < end of copied text >    echo:

## 2018-08-12 NOTE — PROGRESS NOTE ADULT - SUBJECTIVE AND OBJECTIVE BOX
_________________________________________________________________________________________  ========>>  M E D I C A L   A T T E N D I N G    F O L L O W  U P  N O T E  <<=========  -----------------------------------------------------------------------------------------------------    - Patient seen and examined by me approximately sixty minutes ago.   - In summary,  JOSE MAYER is a 52y year old woman who originally presented with cough, SOB  - Patient today overall doing ok, comfortable, eating poorly / poor appetite     ==================>> REVIEW OF SYSTEM <<=================    GEN: no fever, no chills, + multilevel pain including chronic back and neck pain, not well controlled   RESP: no SOB at rest,  +cough and little sputum  CVS: no chest pain, no palpitations, no edema  GI: no abdominal pain, no nausea, no diarrhea   : no dysuria, no frequency, no hematuria  Neuro: no headache, no dizziness  Derm : no itching, no rash     less depressed today    ==================>> PHYSICAL EXAM <<=================    GEN: A&O X 3 , NAD , comfortable  HEENT: NCAT, PERRL, MMM, hearing intact  Neck: supple , no JVD  CVS: S1S2 , regular , No M/R/G appreciated  PULM: no significant wheezing or rhonchi noted   ABD.: soft. non tender, non distended,  bowel sounds present  Extrem: intact pulses , no edema   PSYCH : flat affect       ==================>> MEDICATIONS <<====================    ALBUTerol    90 MICROgram(s) HFA Inhaler 1 Puff(s) Inhalation every 4 hours  ALBUTerol/ipratropium for Nebulization 3 milliLiter(s) Nebulizer every 6 hours  azithromycin  IVPB      azithromycin  IVPB 500 milliGRAM(s) IV Intermittent every 24 hours  benzonatate 100 milliGRAM(s) Oral three times a day  escitalopram 5 milliGRAM(s) Oral daily  gabapentin 200 milliGRAM(s) Oral every 8 hours  lactobacillus acidophilus 1 Tablet(s) Oral three times a day with meals  lamoTRIgine 25 milliGRAM(s) Oral two times a day  lidocaine   Patch 1 Patch Transdermal daily  melatonin 3 milliGRAM(s) Oral at bedtime  nicotine - 21 mG/24Hr(s) Patch 1 patch Transdermal daily  piperacillin/tazobactam IVPB. 3.375 Gram(s) IV Intermittent every 8 hours  tiotropium 18 MICROgram(s) Capsule 1 Capsule(s) Inhalation daily    MEDICATIONS  (PRN):  acetaminophen   Tablet 650 milliGRAM(s) Oral every 6 hours PRN For Temp greater than 38 C (100.4 F)  acetaminophen   Tablet. 650 milliGRAM(s) Oral once PRN pain  benzocaine 15 mG/menthol 3.6 mG Lozenge 1 Lozenge Oral every 2 hours PRN Sore Throat  cyclobenzaprine 5 milliGRAM(s) Oral three times a day PRN Muscle Spasm  ketorolac   Injectable 15 milliGRAM(s) IV Push every 6 hours PRN Moderate Pain (4 - 6)  oxyCODONE    IR 20 milliGRAM(s) Oral every 4 hours PRN Moderate Pain (4 - 6)  zolpidem 5 milliGRAM(s) Oral at bedtime PRN Insomnia    ==================>> VITAL SIGNS <<==================    Vital Signs Last 24 Hrs  T(C): 36.4 (08-12-18 @ 05:00)  T(F): 97.5 (08-12-18 @ 05:00), Max: 98.6 (08-11-18 @ 19:53)  HR: 67 (08-12-18 @ 05:00) (67 - 107)  BP: 96/62 (08-12-18 @ 05:00)  RR: 17 (08-12-18 @ 05:00) (16 - 17)  SpO2: 96% (08-12-18 @ 05:00) (96% - 99%)       ==================>> LAB AND IMAGING <<==================         141  |  107  |  18  ----------------------------<  129<H>  3.6   |  22  |  0.95    Ca    8.8      12 Aug 2018 07:32  Mg     2.3     08-12      ___________________________________________________________________________________  ===============>>  A S S E S S M E N T   A N D   P L A N <<===============  ------------------------------------------------------------------------------------------    · Assessment		  51yo female, from home, c/o productive whitish cough, weakness and vomiting since past Saturday.    Patient being admitted for pNA with cavitary lesion, concerns for TB vs Fungal infx.    Problem/Plan - 1:  ·  Problem: PNA with left lung infiltrate w/ cavitation  -c/w IV abx per ID >> ? oral options pending C/S  - post bronch , pending results  / report   -ID and  Pulm appreciated      Dispo planing pending Bronch and clearance for DC on Oral abx?    Problem/Plan - 2:  ·  Problem: chronic back and neck pain   pain nnot well controlled   pt states used to take up to 30mg oxycodone before, asking to increase..   continue meds as increased ( monitor low BP closely     Problem/Plan - 3:  ·  Problem: HTN   -Monitor BP closely as BP on the low side   supplement hypokalemia and monitor     Problem/Plan - 4:  ·  Problem: Pre-diabetes  no med needed at this time for discharge  monitor FS with RISS as above    Supplement hypokalemia, monitor/ repeat     -GI/DVT Prophylaxis.    --------------------------------------------  Case discussed with pt  Education given on findings and plan of care  ___________________________  H. MINGO Burns.  Pager: [phone]

## 2018-08-12 NOTE — PROGRESS NOTE ADULT - PROBLEM SELECTOR PLAN 1
Most likely secondary to pneumonia however malignancy is a possibility in view of hx of smoking  Bronch done yesterday  if biopsy negative then will need TS for open lung biopsy via VATS if mass persits despite antibiotics therapy. Most likely secondary to pneumonia however malignancy is a possibility in view of hx of smoking  Bronch done friday  if biopsy negative then will need TS for open lung biopsy via VATS if mass persits despite antibiotics therapy.

## 2018-08-12 NOTE — PROGRESS NOTE ADULT - SUBJECTIVE AND OBJECTIVE BOX
Meds:  ABX day # 6    azithromycin  IVPB 500 milliGRAM(s) IV Intermittent every 24 hours  Zosyn 3.375 gm IVpb q 8 hours    Allergies:  Allergies    No Known Allergies    Intolerances      ROS  [  ] UNABLE TO ELICIT    General:  [  ] None  [  ] Fever  [  ] Chills  [ x ] Malaise    Skin:  [ x ] None [  ] Rash  [  ] Wound  [  ] Ulcer    HEENT:  [ x ] None  [  ] Sore Throat  [  ] Nasal congestion/ runny nose  [  ] Photophobia [  ] Neck pain      Chest:  [  ] None   [  ] SOB  [ x ] Cough  [  ] None    Cardiovascular:   [ x ] None  [  ] CP  [  ] Palpitation    Gastrointestinal:  [  ] None  [  ] Abd pain   [ x ] Nausea    [  ] Vomiting   [  ] Diarrhea [ x ] Anorexia	     Genitourinary:  [ x ] None [  ] Polyuria   [  ] Urgency  [  ] Frequency  [  ] Dysuria    [  ]  Hematuria       Musculoskeletal:  [  ] None [  ] Back Pain	[  ] Body aches  [  ] Joint pain  [ x ] Weakness    Neurological: [  ] None [  ]Dizziness  [  ]Visual Disturbance  [  ]Headaches   [ x ] Weakness          PHYSICAL EXAM:  Vital Signs Last 24 Hrs  T(C): 36.4 (12 Aug 2018 05:00), Max: 37 (11 Aug 2018 19:53)  T(F): 97.5 (12 Aug 2018 05:00), Max: 98.6 (11 Aug 2018 19:53)  HR: 67 (12 Aug 2018 05:00) (67 - 107)  BP: 96/62 (12 Aug 2018 05:00) (96/62 - 111/62)  BP(mean): --  RR: 17 (12 Aug 2018 05:00) (16 - 17)  SpO2: 96% (12 Aug 2018 05:00) (96% - 99%)    Constitutional:    HEENT: [ x ] Wnl  [  ] Pharyngeal congestion    Neck:  [ x ] Supple  [  ]Lymphadenopathy  [ x ] No JVD   [  ] JVD  [  ] Masses   [  ] WNL    CHEST/Respiratory:  [ x ]Clear to auscultation  [  ] Rales   [  ] Rhonchi   [  ] Wheezing    [  ] Chest Tenderness      Cardiovascular:  [ x ] Reg S1 S2   [  ] Irreg S1 S2   [ x ]No Murmur  [  ] +ve Murmurs  [  ]Systolic [  ]Diastolic      Abdomen:  [ x ] Soft  [ x ] No tendrerness  [  ] Tenderness  [  ] Organomegaly  [  ] ABD Distention  [  ] Rigidity                       [ x ] No Regidity                       [ x ] No Rebound Tenderness  [  ] No Guarding Rigidity  [  ] Rebound Tenderness[  ] Guarding Rigidity                          [ x ]  +ve Bowel Sounds  [  ] Decreased Bowel Sounds    [  ] Absent Bowel Sounds                            Extremities: [ x ] No edema [  ] Edema  [  ] Clubbing   [  ] Cyanosis                         [ x ] No Tender Calf muscles  [  ] Tender Calf muscles                        [ x ] Palpable peripheral pulses    Neurological: [ x ] Awake  [ x ] Alert  [ x ] Oriented  x  3                           [  ] Confused  [  ] Drowsy  [  ] respond to painful stimuli  [  ] Unresponsive    Skin:  [ x ] Intact [  ] Redness [  ] Thrombophlebitis  [  ] Rashes  [  ] Dry  [  ] Ulcers    Ortho:  [  ] Joint Swelling  [  ] Joint erythema [  ] Joint tenderness                [  ] Increased temp. to touch  [  ] DJD [ x ] WNL          LABS/DIAGNOSTIC TESTS                        13.4   12.7  )-----------( 368      ( 10 Aug 2018 08:17 )             40.1   08-12    141  |  107  |  18  ----------------------------<  129<H>  3.6   |  22  |  0.95    Ca    8.8      12 Aug 2018 07:32  Mg     2.3     08-12              Quantiferon TB Plus: NEGATIVE  Legionella Antigen, Urine: Negative (08.08.18 @ 23:32)  Quantiferon TB Plus: INDETERMINATE Results are indeterminate  Sedimentation Rate, Erythrocyte: 88 mm/Hr (08.06.18 @ 19:03)  C-Reactive Protein, Serum: 18.60 mg/dL (08.06.18 @ 23:31)  Rapid [facility] Result: [facility]  HIV-1/2 Antigen/Antibody Screen by [facility] (08.06.18 @ 11:18)    HIV-1/2 Combo Result: Nonreact      CULTURES:   Culture - Sputum . (08.09.18 @ 17:54)    Gram Stain:   Few polymorphonuclear leukocytes per low power field  Few Squamous epithelial cells per low power field  Numerous Gram Negative Rods per oil power field  Few Yeast like cells per oil power field  Numerous Gram Positive Cocci in Pairs and Chains peroil power field    Specimen Source: .Sputum Sputum      Culture - Sputum . (08.09.18 @ 00:33)    Gram Stain:   Few Squamous epithelial cells per low power field  Few polymorphonuclear leukocytes per low power field  Numerous Gram variable coccobacilli per oil power field    Specimen Source: .Sputum Sputum      Culture - Acid Fast - Sputum w/Smear . (08.07.18 @ 17:23)    Specimen Source: .Sputum Sputum    Acid Fast Bacilli Smear:   No acid fast bacilli seen by fluorochrome stain    Culture - Acid Fast - Sputum w/Smear . (08.06.18 @ 18:25)    Specimen Source: .Sputum Sputum    Acid Fast Bacilli Smear:   No acid fast bacilli seen by fluorochrome stain    Culture - Acid Fast - Sputum w/Smear . (08.06.18 @ 18:10)    Specimen Source: .Sputum Sputum    Acid Fast Bacilli Smear:   No acid fast bacilli seen by fluorochrome stain      Culture - Blood (08.05.18 @ 22:00)    Specimen Source: .Blood Blood    Culture Results:   No growth to date.    Culture - Blood (08.05.18 @ 22:00)    Specimen Source: .Blood Blood    Culture Results:   No growth to date.      RADIOLOGY:      EXAM:  XR CHEST PORTABLE IMMED 1V                            PROCEDURE DATE:  08/10/2018          INTERPRETATION:  AP semierect chest on August 10, 2018 at 8:10 PM.   Patient has left upper lobe mass.    Heart is normal for projection.    Roughly 4.6 x 4.3 cm mass in the left mid upper lung field with cavitary   center is again noted.    Chest is similar to August 8.    IMPRESSION: Unchanged left upper lobe mass.          EXAM:  CT ANGIO CHEST (W)AW IC                            PROCEDURE DATE:  08/06/2018          INTERPRETATION:  HISTORY: Shortness of breath and elevated d-dimer.?   Cavitary mass on x-ray.    TECHNIQUE:  CT pulmonary angiography was performed of the chest according   to standard institutional protocol. Coronal, sagittal and transaxial 3-D   MIP reformatted images are provided from the transaxial source data.     68mL of Omnipaque 350 was administered without complication and 32 mL was   discarded.      COMPARISON: No similar prior study available for comparison.    FINDINGS:   There is good opacification of pulmonary arterial tree. No filling defect   is present to suggest acute pulmonary embolus.    The thyroid gland is unremarkable.     Evaluation of the lung parenchyma demonstrates a 8.3 x 5.3 x 5.4 cm   region of consolidation in the left upper lobe with associated   cavitation. There is an air-fluid level in the largest cavitary region.   There are adjacent nodular opacities to the inferior aspect of the   consolidation within the lingula of the left upper lobe. There is no   crossing of the procedure. The right lung is clear. There is no pleural   effusion or pneumothorax. The trachea and main bronchi are clear.    There are small nonspecific mediastinal and left hilar lymph nodes. The   largest mediastinal lymph node is a left precarinal node measuring 1.7   cm. Hilar lymph nodes measure up to 1.4 cm. There is no significant   supraclavicular, axillary or right hilar adenopathy.    The heart size is normal. There is no pericardial effusion. The thoracic   aorta is normal in caliber without dissection.     Limited images through the upper abdomen demonstrate no acute abnormality.    The visualized osseous structures are within normal limits.     IMPRESSION:  No pulmonary embolism.    Left upper lobe cavitary mass versus cavitary pneumonia. Small   mediastinal and left hilar lymph nodes which could be reactive from   infection or metastatic if malignancy.      EXAM:  XR CHEST PORTABLE IMMED 1V                            PROCEDURE DATE:  08/05/2018          INTERPRETATION:  Chest one view    HISTORY: Shortness of breath    COMPARISON STUDY: 4/28/2017    Frontal expiratory view of the chest shows the heartto be normal in   size. The lungs show left lower lobe superior segment infiltrate with   questionable cavitation within it and there is no evidence of   pneumothorax nor pleural effusion.    IMPRESSION:  Left lung infiltrate possibly with cavitation.         Assessment and Recommendation:   53yo female, from home, c/o productive whitish cough, weakness and vomiting since past Saturday.  Patient was admitted for pneumonia and cavitary lung mass, and was started on UV Rocephin, and Azithromycin.  8/7/18 Patient is afebrile and WBC is improving.  8/8/18 Patient continue to have fever and WBC increased, Rocephin was changed to Zosyn.  8/9/18 WBC is improving, and patient is afebrile.  Repeat QuantiFeron gold test is -VE and WBC continue to improve, and CXR showed lung mass.     Problem/Recommendation - 1:  Problem: PNA (pneumonia).   Recommendation:   1- Continue IV Zosyn.  2- Discontinue Azithromycin.  3- Follow sputum cultures to final reports.  4- Pulmonary management, and follow up.  5- CBC and BMP follow up.    Problem/Recommendation - 2:  ·  Problem: Cavitating mass of lower lobe of left lung.    Recommendation:   1- No need for Airborne isolation.  2- Sputum for AFB X 3 were -VE.  3- Repeat QuantiFeron gold test was -VE.  4- Sputum for cytology.  5- Sputum for bacterial and fungal culture are noted.  6- ESR, and CRP in one week for follow up.    Problem/Recommendation - 3:  ·  Problem: Anemia.    Recommendation:   1- Closely monitor H & H.  2- Observe for bleeding.     Problem/Recommendation - 4:  ·  Problem: HTN (hypertension).    Recommendation:   1- Monitor Blood pressure closely.  2- Blood pressure control.  3- BP. meds as per cardiology and primary care team.     Discussed with medical resident, and patient on the bedside.

## 2018-08-13 VITALS
TEMPERATURE: 98 F | SYSTOLIC BLOOD PRESSURE: 127 MMHG | HEART RATE: 86 BPM | RESPIRATION RATE: 18 BRPM | DIASTOLIC BLOOD PRESSURE: 72 MMHG | OXYGEN SATURATION: 98 %

## 2018-08-13 LAB
ANION GAP SERPL CALC-SCNC: 8 MMOL/L — SIGNIFICANT CHANGE UP (ref 5–17)
BUN SERPL-MCNC: 12 MG/DL — SIGNIFICANT CHANGE UP (ref 7–18)
CALCIUM SERPL-MCNC: 8.6 MG/DL — SIGNIFICANT CHANGE UP (ref 8.4–10.5)
CHLORIDE SERPL-SCNC: 105 MMOL/L — SIGNIFICANT CHANGE UP (ref 96–108)
CO2 SERPL-SCNC: 26 MMOL/L — SIGNIFICANT CHANGE UP (ref 22–31)
CREAT SERPL-MCNC: 0.92 MG/DL — SIGNIFICANT CHANGE UP (ref 0.5–1.3)
EOSINOPHIL NFR BLD AUTO: 2 % — SIGNIFICANT CHANGE UP (ref 0–6)
GLUCOSE SERPL-MCNC: 107 MG/DL — HIGH (ref 70–99)
GRAM STN FLD: SIGNIFICANT CHANGE UP
HCT VFR BLD CALC: 35.6 % — SIGNIFICANT CHANGE UP (ref 34.5–45)
HGB BLD-MCNC: 11.9 G/DL — SIGNIFICANT CHANGE UP (ref 11.5–15.5)
LYMPHOCYTES # BLD AUTO: 36 % — SIGNIFICANT CHANGE UP (ref 13–44)
MAGNESIUM SERPL-MCNC: 2 MG/DL — SIGNIFICANT CHANGE UP (ref 1.6–2.6)
MCHC RBC-ENTMCNC: 31 PG — SIGNIFICANT CHANGE UP (ref 27–34)
MCHC RBC-ENTMCNC: 33.6 GM/DL — SIGNIFICANT CHANGE UP (ref 32–36)
MCV RBC AUTO: 92.4 FL — SIGNIFICANT CHANGE UP (ref 80–100)
MONOCYTES NFR BLD AUTO: 7 % — SIGNIFICANT CHANGE UP (ref 2–14)
NEUTROPHILS NFR BLD AUTO: 55 % — SIGNIFICANT CHANGE UP (ref 43–77)
PHOSPHATE SERPL-MCNC: 3.4 MG/DL — SIGNIFICANT CHANGE UP (ref 2.5–4.5)
PLATELET # BLD AUTO: 346 K/UL — SIGNIFICANT CHANGE UP (ref 150–400)
POTASSIUM SERPL-MCNC: 3.4 MMOL/L — LOW (ref 3.5–5.3)
POTASSIUM SERPL-SCNC: 3.4 MMOL/L — LOW (ref 3.5–5.3)
RBC # BLD: 3.85 M/UL — SIGNIFICANT CHANGE UP (ref 3.8–5.2)
RBC # FLD: 11.4 % — SIGNIFICANT CHANGE UP (ref 10.3–14.5)
SODIUM SERPL-SCNC: 139 MMOL/L — SIGNIFICANT CHANGE UP (ref 135–145)
SPECIMEN SOURCE: SIGNIFICANT CHANGE UP
WBC # BLD: 13.6 K/UL — HIGH (ref 3.8–10.5)
WBC # FLD AUTO: 13.6 K/UL — HIGH (ref 3.8–10.5)

## 2018-08-13 RX ORDER — CIPROFLOXACIN LACTATE 400MG/40ML
1 VIAL (ML) INTRAVENOUS
Qty: 7 | Refills: 0 | OUTPATIENT
Start: 2018-08-13 | End: 2018-08-19

## 2018-08-13 RX ORDER — CYCLOBENZAPRINE HYDROCHLORIDE 10 MG/1
1 TABLET, FILM COATED ORAL
Qty: 30 | Refills: 0 | OUTPATIENT
Start: 2018-08-13 | End: 2018-08-22

## 2018-08-13 RX ORDER — LAMOTRIGINE 25 MG/1
1 TABLET, ORALLY DISINTEGRATING ORAL
Qty: 0 | Refills: 0 | COMMUNITY
Start: 2018-08-13

## 2018-08-13 RX ORDER — NICOTINE POLACRILEX 2 MG
1 GUM BUCCAL
Qty: 0 | Refills: 0 | COMMUNITY
Start: 2018-08-13

## 2018-08-13 RX ORDER — TRAMADOL HYDROCHLORIDE 50 MG/1
0.5 TABLET ORAL
Qty: 3 | Refills: 0 | OUTPATIENT
Start: 2018-08-13 | End: 2018-08-15

## 2018-08-13 RX ORDER — NICOTINE POLACRILEX 2 MG
1 GUM BUCCAL
Qty: 30 | Refills: 0 | OUTPATIENT
Start: 2018-08-13 | End: 2018-09-11

## 2018-08-13 RX ORDER — LAMOTRIGINE 25 MG/1
1 TABLET, ORALLY DISINTEGRATING ORAL
Qty: 60 | Refills: 0 | OUTPATIENT
Start: 2018-08-13 | End: 2018-09-11

## 2018-08-13 RX ORDER — TIOTROPIUM BROMIDE 18 UG/1
1 CAPSULE ORAL; RESPIRATORY (INHALATION)
Qty: 30 | Refills: 0 | OUTPATIENT
Start: 2018-08-13 | End: 2018-09-11

## 2018-08-13 RX ORDER — CYCLOBENZAPRINE HYDROCHLORIDE 10 MG/1
1 TABLET, FILM COATED ORAL
Qty: 0 | Refills: 0 | COMMUNITY
Start: 2018-08-13

## 2018-08-13 RX ORDER — ESCITALOPRAM OXALATE 10 MG/1
1 TABLET, FILM COATED ORAL
Qty: 30 | Refills: 0 | OUTPATIENT
Start: 2018-08-13 | End: 2018-09-11

## 2018-08-13 RX ORDER — GABAPENTIN 400 MG/1
2 CAPSULE ORAL
Qty: 90 | Refills: 0 | OUTPATIENT
Start: 2018-08-13 | End: 2018-08-27

## 2018-08-13 RX ORDER — ALPRAZOLAM 0.25 MG
1 TABLET ORAL
Qty: 0 | Refills: 0 | COMMUNITY

## 2018-08-13 RX ORDER — NEBIVOLOL HYDROCHLORIDE 5 MG/1
1 TABLET ORAL
Qty: 0 | Refills: 0 | COMMUNITY

## 2018-08-13 RX ORDER — BUDESONIDE AND FORMOTEROL FUMARATE DIHYDRATE 160; 4.5 UG/1; UG/1
2 AEROSOL RESPIRATORY (INHALATION)
Qty: 1 | Refills: 0 | OUTPATIENT
Start: 2018-08-13

## 2018-08-13 RX ORDER — GABAPENTIN 400 MG/1
2 CAPSULE ORAL
Qty: 0 | Refills: 0 | COMMUNITY
Start: 2018-08-13

## 2018-08-13 RX ORDER — POTASSIUM CHLORIDE 20 MEQ
40 PACKET (EA) ORAL ONCE
Qty: 0 | Refills: 0 | Status: COMPLETED | OUTPATIENT
Start: 2018-08-13 | End: 2018-08-13

## 2018-08-13 RX ORDER — ESCITALOPRAM OXALATE 10 MG/1
1 TABLET, FILM COATED ORAL
Qty: 0 | Refills: 0 | COMMUNITY
Start: 2018-08-13

## 2018-08-13 RX ORDER — TIOTROPIUM BROMIDE 18 UG/1
1 CAPSULE ORAL; RESPIRATORY (INHALATION)
Qty: 0 | Refills: 0 | COMMUNITY
Start: 2018-08-13

## 2018-08-13 RX ORDER — BUDESONIDE AND FORMOTEROL FUMARATE DIHYDRATE 160; 4.5 UG/1; UG/1
1 AEROSOL RESPIRATORY (INHALATION)
Qty: 1 | Refills: 0 | OUTPATIENT
Start: 2018-08-13

## 2018-08-13 RX ADMIN — OXYCODONE HYDROCHLORIDE 20 MILLIGRAM(S): 5 TABLET ORAL at 18:49

## 2018-08-13 RX ADMIN — GABAPENTIN 200 MILLIGRAM(S): 400 CAPSULE ORAL at 13:37

## 2018-08-13 RX ADMIN — BENZOCAINE AND MENTHOL 1 LOZENGE: 5; 1 LIQUID ORAL at 11:33

## 2018-08-13 RX ADMIN — Medication 100 MILLIGRAM(S): at 13:37

## 2018-08-13 RX ADMIN — LIDOCAINE 1 PATCH: 4 CREAM TOPICAL at 00:47

## 2018-08-13 RX ADMIN — PIPERACILLIN AND TAZOBACTAM 25 GRAM(S): 4; .5 INJECTION, POWDER, LYOPHILIZED, FOR SOLUTION INTRAVENOUS at 05:41

## 2018-08-13 RX ADMIN — OXYCODONE HYDROCHLORIDE 20 MILLIGRAM(S): 5 TABLET ORAL at 05:41

## 2018-08-13 RX ADMIN — Medication 1 TABLET(S): at 08:24

## 2018-08-13 RX ADMIN — Medication 1 TABLET(S): at 17:01

## 2018-08-13 RX ADMIN — Medication 1 TABLET(S): at 11:32

## 2018-08-13 RX ADMIN — GABAPENTIN 200 MILLIGRAM(S): 400 CAPSULE ORAL at 05:41

## 2018-08-13 RX ADMIN — LAMOTRIGINE 25 MILLIGRAM(S): 25 TABLET, ORALLY DISINTEGRATING ORAL at 17:01

## 2018-08-13 RX ADMIN — Medication 1 PATCH: at 13:36

## 2018-08-13 RX ADMIN — Medication 100 MILLIGRAM(S): at 05:40

## 2018-08-13 RX ADMIN — BENZOCAINE AND MENTHOL 1 LOZENGE: 5; 1 LIQUID ORAL at 05:40

## 2018-08-13 RX ADMIN — PIPERACILLIN AND TAZOBACTAM 25 GRAM(S): 4; .5 INJECTION, POWDER, LYOPHILIZED, FOR SOLUTION INTRAVENOUS at 13:36

## 2018-08-13 RX ADMIN — CYCLOBENZAPRINE HYDROCHLORIDE 5 MILLIGRAM(S): 10 TABLET, FILM COATED ORAL at 11:32

## 2018-08-13 RX ADMIN — Medication 1 PATCH: at 11:28

## 2018-08-13 RX ADMIN — OXYCODONE HYDROCHLORIDE 20 MILLIGRAM(S): 5 TABLET ORAL at 14:02

## 2018-08-13 RX ADMIN — Medication 3 MILLILITER(S): at 14:24

## 2018-08-13 RX ADMIN — OXYCODONE HYDROCHLORIDE 20 MILLIGRAM(S): 5 TABLET ORAL at 06:11

## 2018-08-13 RX ADMIN — AZITHROMYCIN 250 MILLIGRAM(S): 500 TABLET, FILM COATED ORAL at 05:42

## 2018-08-13 RX ADMIN — LIDOCAINE 1 PATCH: 4 CREAM TOPICAL at 11:28

## 2018-08-13 RX ADMIN — Medication 40 MILLIEQUIVALENT(S): at 17:49

## 2018-08-13 RX ADMIN — OXYCODONE HYDROCHLORIDE 20 MILLIGRAM(S): 5 TABLET ORAL at 17:01

## 2018-08-13 RX ADMIN — LAMOTRIGINE 25 MILLIGRAM(S): 25 TABLET, ORALLY DISINTEGRATING ORAL at 05:41

## 2018-08-13 RX ADMIN — ESCITALOPRAM OXALATE 5 MILLIGRAM(S): 10 TABLET, FILM COATED ORAL at 11:28

## 2018-08-13 RX ADMIN — Medication 3 MILLILITER(S): at 09:33

## 2018-08-13 RX ADMIN — OXYCODONE HYDROCHLORIDE 20 MILLIGRAM(S): 5 TABLET ORAL at 11:28

## 2018-08-13 NOTE — PROGRESS NOTE ADULT - PROBLEM SELECTOR PLAN 3
Follow up CXR  antibiotics  monitor temp and WBC  ID consult  Sputum for AFB x 3 neg  Repiratory isolation discontinued  Quantiferon TB Gold test. Follow up CXR  antibiotics  monitor temp and WBC  ID follow up  Sputum for AFB x 3 neg  Repiratory isolation discontinued  Quantiferon TB Gold test.
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
## 2018-08-13 NOTE — PROGRESS NOTE ADULT - SUBJECTIVE AND OBJECTIVE BOX
Resident Note discussed with  primary attending    Patient is a 52y old  Female who presents with a chief complaint of SOB (10 Aug 2018 11:25)      INTERVAL HPI/OVERNIGHT EVENTS: no new complaints    MEDICATIONS  (STANDING):  ALBUTerol    90 MICROgram(s) HFA Inhaler 1 Puff(s) Inhalation every 4 hours  ALBUTerol/ipratropium for Nebulization 3 milliLiter(s) Nebulizer every 6 hours  azithromycin  IVPB      azithromycin  IVPB 500 milliGRAM(s) IV Intermittent every 24 hours  benzonatate 100 milliGRAM(s) Oral three times a day  escitalopram 5 milliGRAM(s) Oral daily  gabapentin 200 milliGRAM(s) Oral every 8 hours  lactobacillus acidophilus 1 Tablet(s) Oral three times a day with meals  lamoTRIgine 25 milliGRAM(s) Oral two times a day  lidocaine   Patch 1 Patch Transdermal daily  melatonin 3 milliGRAM(s) Oral at bedtime  nicotine - 21 mG/24Hr(s) Patch 1 patch Transdermal daily  piperacillin/tazobactam IVPB. 3.375 Gram(s) IV Intermittent every 8 hours  tiotropium 18 MICROgram(s) Capsule 1 Capsule(s) Inhalation daily    MEDICATIONS  (PRN):  acetaminophen   Tablet 650 milliGRAM(s) Oral every 6 hours PRN For Temp greater than 38 C (100.4 F)  acetaminophen   Tablet. 650 milliGRAM(s) Oral once PRN pain  benzocaine 15 mG/menthol 3.6 mG Lozenge 1 Lozenge Oral every 2 hours PRN Sore Throat  cyclobenzaprine 5 milliGRAM(s) Oral three times a day PRN Muscle Spasm  oxyCODONE    IR 20 milliGRAM(s) Oral every 4 hours PRN Moderate Pain (4 - 6)  zolpidem 5 milliGRAM(s) Oral at bedtime PRN Insomnia      __________________________________________________  REVIEW OF SYSTEMS:    CONSTITUTIONAL: No fever,   EYES: no acute visual disturbances  NECK: No pain or stiffness  RESPIRATORY: No cough; No shortness of breath  CARDIOVASCULAR: No chest pain, no palpitations  GASTROINTESTINAL: No pain. No nausea or vomiting; No diarrhea   NEUROLOGICAL: No headache or numbness, no tremors  MUSCULOSKELETAL: No joint pain, no muscle pain  GENITOURINARY: no dysuria, no frequency, no hesitancy  PSYCHIATRY: no depression , no anxiety  ALL OTHER  ROS negative        Vital Signs Last 24 Hrs  T(C): 36.7 (13 Aug 2018 05:00), Max: 36.9 (12 Aug 2018 13:21)  T(F): 98.1 (13 Aug 2018 05:00), Max: 98.4 (12 Aug 2018 13:21)  HR: 69 (13 Aug 2018 05:00) (66 - 76)  BP: 95/54 (13 Aug 2018 05:00) (95/54 - 131/77)  BP(mean): --  RR: 17 (13 Aug 2018 05:00) (17 - 17)  SpO2: 98% (13 Aug 2018 05:00) (97% - 100%)    ________________________________________________  PHYSICAL EXAM:  GENERAL: NAD  HEENT:Normocephalic;  conjunctivae and sclerae clear; moist mucous membranes;   NECK : supple  CHEST/LUNG: Clear to auscuitation bilaterally with good air entry   HEART: S1 S2  regular; no murmurs, gallops or rubs  ABDOMEN: Soft, Nontender, Nondistended; Bowel sounds present  EXTREMITIES: no cyanosis; no edema; no calf tenderness  NERVOUS SYSTEM:  Awake and alert; Oriented  to place, person and time ; no new deficits    _________________________________________________  LABS:    08-12    141  |  107  |  18  ----------------------------<  129<H>  3.6   |  22  |  0.95    Ca    8.8      12 Aug 2018 07:32  Mg     2.3     08-12                RADIOLOGY & ADDITIONAL TESTS:    Imaging Personally Reviewed:  YES    Consultant(s) Notes Reviewed:   YES    Care Discussed with Consultants : YES     Plan of care was discussed with patient and /or primary care giver; all questions and concerns were addressed and care was aligned with patient's wishes. Resident Note discussed with  primary attending    Patient is a 52y old  Female who presents with a chief complaint of SOB (10 Aug 2018 11:25)      INTERVAL HPI/ OVERNIGHT EVENTS: no new complaints, cough is still present.    MEDICATIONS  (STANDING):  ALBUTerol    90 MICROgram(s) HFA Inhaler 1 Puff(s) Inhalation every 4 hours  ALBUTerol/ipratropium for Nebulization 3 milliLiter(s) Nebulizer every 6 hours  azithromycin  IVPB      azithromycin  IVPB 500 milliGRAM(s) IV Intermittent every 24 hours  benzonatate 100 milliGRAM(s) Oral three times a day  escitalopram 5 milliGRAM(s) Oral daily  gabapentin 200 milliGRAM(s) Oral every 8 hours  lactobacillus acidophilus 1 Tablet(s) Oral three times a day with meals  lamoTRIgine 25 milliGRAM(s) Oral two times a day  lidocaine   Patch 1 Patch Transdermal daily  melatonin 3 milliGRAM(s) Oral at bedtime  nicotine - 21 mG/24Hr(s) Patch 1 patch Transdermal daily  piperacillin/tazobactam IVPB. 3.375 Gram(s) IV Intermittent every 8 hours  tiotropium 18 MICROgram(s) Capsule 1 Capsule(s) Inhalation daily    MEDICATIONS  (PRN):  acetaminophen   Tablet 650 milliGRAM(s) Oral every 6 hours PRN For Temp greater than 38 C (100.4 F)  acetaminophen   Tablet. 650 milliGRAM(s) Oral once PRN pain  benzocaine 15 mG/menthol 3.6 mG Lozenge 1 Lozenge Oral every 2 hours PRN Sore Throat  cyclobenzaprine 5 milliGRAM(s) Oral three times a day PRN Muscle Spasm  oxyCODONE    IR 20 milliGRAM(s) Oral every 4 hours PRN Moderate Pain (4 - 6)  zolpidem 5 milliGRAM(s) Oral at bedtime PRN Insomnia      __________________________________________________  REVIEW OF SYSTEMS:    CONSTITUTIONAL: No fever,   EYES: no acute visual disturbances  NECK: No pain or stiffness  RESPIRATORY: + cough; No shortness of breath  CARDIOVASCULAR: No chest pain, no palpitations  GASTROINTESTINAL: No pain. No nausea or vomiting; No diarrhea; +constipation  NEUROLOGICAL: No headache or numbness, no tremors  MUSCULOSKELETAL: No joint pain, no muscle pain  GENITOURINARY: no dysuria, no frequency, no hesitancy  PSYCHIATRY: no depression , no anxiety  ALL OTHER  ROS negative        Vital Signs Last 24 Hrs  T(C): 36.7 (13 Aug 2018 05:00), Max: 36.9 (12 Aug 2018 13:21)  T(F): 98.1 (13 Aug 2018 05:00), Max: 98.4 (12 Aug 2018 13:21)  HR: 69 (13 Aug 2018 05:00) (66 - 76)  BP: 95/54 (13 Aug 2018 05:00) (95/54 - 131/77)  BP(mean): --  RR: 17 (13 Aug 2018 05:00) (17 - 17)  SpO2: 98% (13 Aug 2018 05:00) (97% - 100%)    ________________________________________________  PHYSICAL EXAM:  GENERAL: NAD  HEENT: Normocephalic;  conjunctivae and sclerae clear; moist mucous membranes;   NECK : supple  CHEST/LUNG: Clear to auscultation bilaterally with good air entry   HEART: S1 S2  regular; no murmurs, gallops or rubs  ABDOMEN: Soft, Nontender, Nondistended; Bowel sounds present  EXTREMITIES: no cyanosis; no edema; no calf tenderness  NERVOUS SYSTEM:  Awake and alert; Oriented  to place, person and time ; no new deficits    _________________________________________________  LABS:    08-12    141  |  107  |  18  ----------------------------<  129<H>  3.6   |  22  |  0.95    Ca    8.8      12 Aug 2018 07:32  Mg     2.3     08-12                RADIOLOGY & ADDITIONAL TESTS:    Imaging Personally Reviewed:  YES    Consultant(s) Notes Reviewed:   YES    Care Discussed with Consultants : YES     Plan of care was discussed with patient and /or primary care giver; all questions and concerns were addressed and care was aligned with patient's wishes.

## 2018-08-13 NOTE — PROGRESS NOTE ADULT - PROBLEM SELECTOR PLAN 4
Bronchodilators  symbicort 160/4.5 mcgs 2 puff po BID  spiriva  Pfts as OP.
IMPROVE VTE Individual Risk Assessment          RISK                                                          Points  [  ] Previous VTE                                                3  [  ] Thrombophilia                                             2  [  ] Lower limb paralysis                                   2        (unable to hold up >15 seconds)    [  ] Current Cancer                                             2         (within 6 months)  [ x ] Immobilization > 24 hrs                              1  [  ] ICU/CCU stay > 24 hours                             1  [  ] Age > 60                                                         1    IMPROVE VTE Score: 1  No need for DVT chemoppx
Bronchodilators  symbicort 160/4.5 mcgs 2 puff po BID  spiriva  Pfts as OP.
IMPROVE VTE Individual Risk Assessment          RISK                                                          Points  [  ] Previous VTE                                                3  [  ] Thrombophilia                                             2  [  ] Lower limb paralysis                                   2        (unable to hold up >15 seconds)    [  ] Current Cancer                                             2         (within 6 months)  [ x ] Immobilization > 24 hrs                              1  [  ] ICU/CCU stay > 24 hours                             1  [  ] Age > 60                                                         1    IMPROVE VTE Score: 1  No need for DVT chemoppx
aracelis rogel

## 2018-08-13 NOTE — PROGRESS NOTE ADULT - PROVIDER SPECIALTY LIST ADULT
Infectious Disease
Internal Medicine
Pain Medicine
Pain Medicine
Pulmonology
Internal Medicine

## 2018-08-13 NOTE — PROGRESS NOTE ADULT - SUBJECTIVE AND OBJECTIVE BOX
Meds:  ABX day # 7      Zosyn 3.375 gm IVpb q 8 hours    Allergies:  Allergies    No Known Allergies    Intolerances      ROS  [  ] UNABLE TO ELICIT    General:  [  ] None  [  ] Fever  [  ] Chills  [ x ] Malaise    Skin:  [ x ] None [  ] Rash  [  ] Wound  [  ] Ulcer    HEENT:  [ x ] None  [  ] Sore Throat  [  ] Nasal congestion/ runny nose  [  ] Photophobia [  ] Neck pain      Chest:  [  ] None   [  ] SOB  [ x ] Cough  [  ] None    Cardiovascular:   [ x ] None  [  ] CP  [  ] Palpitation    Gastrointestinal:  [  ] None  [  ] Abd pain   [ x ] Nausea    [  ] Vomiting   [  ] Diarrhea [ x ] Anorexia	     Genitourinary:  [ x ] None [  ] Polyuria   [  ] Urgency  [  ] Frequency  [  ] Dysuria    [  ]  Hematuria       Musculoskeletal:  [  ] None [  ] Back Pain	[  ] Body aches  [  ] Joint pain  [ x ] Weakness    Neurological: [  ] None [  ]Dizziness  [  ]Visual Disturbance  [  ]Headaches   [ x ] Weakness          PHYSICAL EXAM:  Vital Signs Last 24 Hrs  T(C): 36.7 (13 Aug 2018 05:00), Max: 36.9 (12 Aug 2018 13:21)  T(F): 98.1 (13 Aug 2018 05:00), Max: 98.4 (12 Aug 2018 13:21)  HR: 69 (13 Aug 2018 05:00) (66 - 76)  BP: 95/54 (13 Aug 2018 05:00) (95/54 - 131/77)  BP(mean): --  RR: 17 (13 Aug 2018 05:00) (17 - 17)  SpO2: 98% (13 Aug 2018 05:00) (97% - 100%)    Constitutional:    HEENT: [ x ] Wnl  [  ] Pharyngeal congestion    Neck:  [ x ] Supple  [  ]Lymphadenopathy  [ x ] No JVD   [  ] JVD  [  ] Masses   [  ] WNL    CHEST/Respiratory:  [ x ]Clear to auscultation  [  ] Rales   [  ] Rhonchi   [  ] Wheezing    [  ] Chest Tenderness      Cardiovascular:  [ x ] Reg S1 S2   [  ] Irreg S1 S2   [ x ]No Murmur  [  ] +ve Murmurs  [  ]Systolic [  ]Diastolic      Abdomen:  [ x ] Soft  [ x ] No tendrerness  [  ] Tenderness  [  ] Organomegaly  [  ] ABD Distention  [  ] Rigidity                       [ x ] No Regidity                       [ x ] No Rebound Tenderness  [  ] No Guarding Rigidity  [  ] Rebound Tenderness[  ] Guarding Rigidity                          [ x ]  +ve Bowel Sounds  [  ] Decreased Bowel Sounds    [  ] Absent Bowel Sounds                            Extremities: [ x ] No edema [  ] Edema  [  ] Clubbing   [  ] Cyanosis                         [ x ] No Tender Calf muscles  [  ] Tender Calf muscles                        [ x ] Palpable peripheral pulses    Neurological: [ x ] Awake  [ x ] Alert  [ x ] Oriented  x  3                           [  ] Confused  [  ] Drowsy  [  ] respond to painful stimuli  [  ] Unresponsive    Skin:  [ x ] Intact [  ] Redness [  ] Thrombophlebitis  [  ] Rashes  [  ] Dry  [  ] Ulcers    Ortho:  [  ] Joint Swelling  [  ] Joint erythema [  ] Joint tenderness                [  ] Increased temp. to touch  [  ] DJD [ x ] WNL          LABS/DIAGNOSTIC TESTS                        13.4   12.7  )-----------( 368      ( 10 Aug 2018 08:17 )             40.1   08-12    141  |  107  |  18  ----------------------------<  129<H>  3.6   |  22  |  0.95    Ca    8.8      12 Aug 2018 07:32  Mg     2.3     08-12                  Quantiferon TB Plus: NEGATIVE  Legionella Antigen, Urine: Negative (08.08.18 @ 23:32)  Quantiferon TB Plus: INDETERMINATE Results are indeterminate  Sedimentation Rate, Erythrocyte: 88 mm/Hr (08.06.18 @ 19:03)  C-Reactive Protein, Serum: 18.60 mg/dL (08.06.18 @ 23:31)  Rapid RVP Result: [facility]  HIV-1/2 Antigen/Antibody Screen by [facility] (08.06.18 @ 11:18)    HIV-1/2 Combo Result: Nonreact      CULTURES:   Culture - Sputum . (08.09.18 @ 17:54)    Gram Stain:   Few polymorphonuclear leukocytes per low power field  Few Squamous epithelial cells per low power field  Numerous Gram Negative Rods per oil power field  Few Yeast like cells per oil power field  Numerous Gram Positive Cocci in Pairs and Chains peroil power field    Specimen Source: .Sputum Sputum      Culture - Sputum . (08.09.18 @ 00:33)    Gram Stain:   Few Squamous epithelial cells per low power field  Few polymorphonuclear leukocytes per low power field  Numerous Gram variable coccobacilli per oil power field    Specimen Source: .Sputum Sputum      Culture - Acid Fast - Sputum w/Smear . (08.07.18 @ 17:23)    Specimen Source: .Sputum Sputum    Acid Fast Bacilli Smear:   No acid fast bacilli seen by fluorochrome stain    Culture - Acid Fast - Sputum w/Smear . (08.06.18 @ 18:25)    Specimen Source: .Sputum Sputum    Acid Fast Bacilli Smear:   No acid fast bacilli seen by fluorochrome stain    Culture - Acid Fast - Sputum w/Smear . (08.06.18 @ 18:10)    Specimen Source: .Sputum Sputum    Acid Fast Bacilli Smear:   No acid fast bacilli seen by fluorochrome stain      Culture - Blood (08.05.18 @ 22:00)    Specimen Source: .Blood Blood    Culture Results:   No growth to date.    Culture - Blood (08.05.18 @ 22:00)    Specimen Source: .Blood Blood    Culture Results:   No growth to date.      RADIOLOGY:      EXAM:  XR CHEST PORTABLE IMMED 1V                            PROCEDURE DATE:  08/10/2018          INTERPRETATION:  AP semierect chest on August 10, 2018 at 8:10 PM.   Patient has left upper lobe mass.    Heart is normal for projection.    Roughly 4.6 x 4.3 cm mass in the left mid upper lung field with cavitary   center is again noted.    Chest is similar to August 8.    IMPRESSION: Unchanged left upper lobe mass.          EXAM:  CT ANGIO CHEST (W)AW IC                            PROCEDURE DATE:  08/06/2018          INTERPRETATION:  HISTORY: Shortness of breath and elevated d-dimer.?   Cavitary mass on x-ray.    TECHNIQUE:  CT pulmonary angiography was performed of the chest according   to standard institutional protocol. Coronal, sagittal and transaxial 3-D   MIP reformatted images are provided from the transaxial source data.     68mL of Omnipaque 350 was administered without complication and 32 mL was   discarded.      COMPARISON: No similar prior study available for comparison.    FINDINGS:   There is good opacification of pulmonary arterial tree. No filling defect   is present to suggest acute pulmonary embolus.    The thyroid gland is unremarkable.     Evaluation of the lung parenchyma demonstrates a 8.3 x 5.3 x 5.4 cm   region of consolidation in the left upper lobe with associated   cavitation. There is an air-fluid level in the largest cavitary region.   There are adjacent nodular opacities to the inferior aspect of the   consolidation within the lingula of the left upper lobe. There is no   crossing of the procedure. The right lung is clear. There is no pleural   effusion or pneumothorax. The trachea and main bronchi are clear.    There are small nonspecific mediastinal and left hilar lymph nodes. The   largest mediastinal lymph node is a left precarinal node measuring 1.7   cm. Hilar lymph nodes measure up to 1.4 cm. There is no significant   supraclavicular, axillary or right hilar adenopathy.    The heart size is normal. There is no pericardial effusion. The thoracic   aorta is normal in caliber without dissection.     Limited images through the upper abdomen demonstrate no acute abnormality.    The visualized osseous structures are within normal limits.     IMPRESSION:  No pulmonary embolism.    Left upper lobe cavitary mass versus cavitary pneumonia. Small   mediastinal and left hilar lymph nodes which could be reactive from   infection or metastatic if malignancy.      EXAM:  XR CHEST PORTABLE IMMED 1V                            PROCEDURE DATE:  08/05/2018          INTERPRETATION:  Chest one view    HISTORY: Shortness of breath    COMPARISON STUDY: 4/28/2017    Frontal expiratory view of the chest shows the heartto be normal in   size. The lungs show left lower lobe superior segment infiltrate with   questionable cavitation within it and there is no evidence of   pneumothorax nor pleural effusion.    IMPRESSION:  Left lung infiltrate possibly with cavitation.         Assessment and Recommendation:   51yo female, from home, c/o productive whitish cough, weakness and vomiting since past Saturday.  Patient was admitted for pneumonia and cavitary lung mass, and was started on UV Rocephin, and Azithromycin.  8/7/18 Patient is afebrile and WBC is improving.  8/8/18 Patient continue to have fever and WBC increased, Rocephin was changed to Zosyn.  8/9/18 WBC is improving, and patient is afebrile.  Repeat QuantiFeron gold test is -VE and WBC continue to improve, and CXR showed lung mass.   8/13/18 Patient feels much better, but CBC result is not available yet.    Problem/Recommendation - 1:  Problem: PNA (pneumonia).   Recommendation:   1- Change IV Zosyn to levaquin 500 mg po q day x 1 week(till 8/20/18).  2- Discontinue Azithromycin.  3- Follow sputum cultures to final reports are noted.  4- Pulmonary management, and follow up bronchoscopy results.  5- CBC and BMP follow up.    Problem/Recommendation - 2:  ·  Problem: Cavitating mass of lower lobe of left lung.    Recommendation:   1- ESR, and CRP in one weekly for follow up.  2- ABX and follow up as per problem # 1.    Problem/Recommendation - 3:  ·  Problem: Anemia.    Recommendation:   1- Closely monitor H & H.  2- Observe for bleeding.     Problem/Recommendation - 4:  ·  Problem: HTN (hypertension).    Recommendation:   1- Monitor Blood pressure closely.  2- Blood pressure control.  3- BP. meds as per cardiology and primary care team.     Discussed with medical resident, and patient on the bedside.
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
## 2018-08-13 NOTE — PROGRESS NOTE ADULT - PROBLEM SELECTOR PROBLEM 4
Need for prophylactic measure
COPD (chronic obstructive pulmonary disease)
Need for prophylactic measure
Need for prophylactic measure
COPD (chronic obstructive pulmonary disease)
Need for prophylactic measure
Need for prophylactic measure
Diarrhea
COPD (chronic obstructive pulmonary disease)

## 2018-08-13 NOTE — PROGRESS NOTE ADULT - PROBLEM SELECTOR PLAN 2
-Patient p/w left lower lobe cavitary lesion, ruled out TB and aspergillus.   -s/p Bronchoscopy , results pending   -Pulm: Dr. Wagner

## 2018-08-13 NOTE — PROGRESS NOTE ADULT - SUBJECTIVE AND OBJECTIVE BOX
Patient is a 52y old  Female who presents with a chief complaint of SOB (10 Aug 2018 11:25)    Awake, alert, comfortable in bed in NAD.  Pain in neck and upper back improved. Has hx of smoking and has been told to have Copd in the past. Feeling better. Sputum for AFB x 3 neg. S/p Bronchoscopy with biopsy. Path report pending. Clinically improved.    INTERVAL HPI/OVERNIGHT EVENTS:      VITAL SIGNS:  T(F): 98.1 (08-13-18 @ 05:00)  HR: 69 (08-13-18 @ 05:00)  BP: 95/54 (08-13-18 @ 05:00)  RR: 17 (08-13-18 @ 05:00)  SpO2: 98% (08-13-18 @ 05:00)  Wt(kg): --  I&O's Detail          REVIEW OF SYSTEMS:    CONSTITUTIONAL:  No fevers, chills, sweats    HEENT:  Eyes:  No diplopia or blurred vision. ENT:  No earache, sore throat or runny nose.    CARDIOVASCULAR:  No pressure, squeezing, tightness, or heaviness about the chest; no palpitations.    RESPIRATORY:  Per HPI    GASTROINTESTINAL:  No abdominal pain, nausea, vomiting or diarrhea.    GENITOURINARY:  No dysuria, frequency or urgency.    NEUROLOGIC:  No paresthesias, fasciculations, seizures or weakness.    PSYCHIATRIC:  No disorder of thought or mood.      PHYSICAL EXAM:    Constitutional: Well developed and nourished  Eyes:Perrla  ENMT: normal  Neck:supple  Respiratory: + Rales on left upper back  Cardiovascular: S1 S2 regular  Gastrointestinal: Soft, Non tender  Extremities: No edema  Vascular:normal  Neurological:Awake, alert,Ox3  Musculoskeletal:Normal      MEDICATIONS  (STANDING):  ALBUTerol    90 MICROgram(s) HFA Inhaler 1 Puff(s) Inhalation every 4 hours  ALBUTerol/ipratropium for Nebulization 3 milliLiter(s) Nebulizer every 6 hours  azithromycin  IVPB      azithromycin  IVPB 500 milliGRAM(s) IV Intermittent every 24 hours  benzonatate 100 milliGRAM(s) Oral three times a day  escitalopram 5 milliGRAM(s) Oral daily  gabapentin 200 milliGRAM(s) Oral every 8 hours  lactobacillus acidophilus 1 Tablet(s) Oral three times a day with meals  lamoTRIgine 25 milliGRAM(s) Oral two times a day  lidocaine   Patch 1 Patch Transdermal daily  melatonin 3 milliGRAM(s) Oral at bedtime  nicotine - 21 mG/24Hr(s) Patch 1 patch Transdermal daily  piperacillin/tazobactam IVPB. 3.375 Gram(s) IV Intermittent every 8 hours  tiotropium 18 MICROgram(s) Capsule 1 Capsule(s) Inhalation daily    MEDICATIONS  (PRN):  acetaminophen   Tablet 650 milliGRAM(s) Oral every 6 hours PRN For Temp greater than 38 C (100.4 F)  acetaminophen   Tablet. 650 milliGRAM(s) Oral once PRN pain  benzocaine 15 mG/menthol 3.6 mG Lozenge 1 Lozenge Oral every 2 hours PRN Sore Throat  cyclobenzaprine 5 milliGRAM(s) Oral three times a day PRN Muscle Spasm  oxyCODONE    IR 20 milliGRAM(s) Oral every 4 hours PRN Moderate Pain (4 - 6)  zolpidem 5 milliGRAM(s) Oral at bedtime PRN Insomnia      Allergies    No Known Allergies    Intolerances        LABS:                        11.9   13.6  )-----------( 346      ( 13 Aug 2018 12:44 )             35.6     08-13    139  |  105  |  12  ----------------------------<  107<H>  3.4<L>   |  26  |  0.92    Ca    8.6      13 Aug 2018 12:44  Phos  3.4     08-13  Mg     2.0     08-13                CAPILLARY BLOOD GLUCOSE            RADIOLOGY & ADDITIONAL TESTS:    CXR:  < from: Xray Chest 1 View-PORTABLE IMMEDIATE (08.10.18 @ 20:24) >    IMPRESSION: Unchanged left upper lobe mass.    < end of copied text >    Ct scan chest:  < from: CT Angio Chest w/ IV Cont (08.06.18 @ 00:24) >  IMPRESSION:  No pulmonary embolism.    Left upper lobe cavitary mass versus cavitary pneumonia. Small   mediastinal and left hilar lymph nodes which could be reactive from   infection or metastatic if malignancy.    < end of copied text >    ekg;    echo:

## 2018-08-13 NOTE — PROGRESS NOTE ADULT - PROBLEM SELECTOR PROBLEM 3
HTN (hypertension)
HTN (hypertension)
PNA (pneumonia)
HTN (hypertension)
PNA (pneumonia)
HTN (hypertension)
HTN (hypertension)
PNA (pneumonia)
Cavitating mass of lower lobe of left lung

## 2018-08-13 NOTE — PROGRESS NOTE ADULT - ASSESSMENT
53yo female, from home, c/o sob, productive whitish cough, weakness and vomiting. Admitted for PNA with cavitary lesion. TB ruled out, underwent Bronchoscopy, pending results.

## 2018-08-13 NOTE — PROGRESS NOTE ADULT - PROBLEM SELECTOR PLAN 1
-Patient p/w cough, fever and weakness for 1 week  -On admission: Leukocytosis, afebrile, lactate wnL, not septic  -CXR: left lung infiltrate w/ cavitation; RVP negative  -CT Chest: Cavitation to the Lt upper lobe  -Aspergillus serology negative.  -QuantiFeron gold TB negative   -Currently on Zosyn + Zithromax  -s/p Bronchoscopy, pending results.   -ID consult: Dr Lloyd, recommended to switch to PO Levaquin to complete the 14 day course.   -Pulm: Dr. Wagner will follow up with Bronchoscopy results, plan for discharge today.

## 2018-08-13 NOTE — PROGRESS NOTE ADULT - PROBLEM SELECTOR PLAN 1
Most likely secondary to pneumonia however malignancy is a possibility in view of hx of smoking  Bronch done friday  if biopsy negative then will need TS for open lung biopsy via VATS if mass persits despite antibiotics therapy.

## 2018-08-14 LAB
NIGHT BLUE STAIN TISS: SIGNIFICANT CHANGE UP
NON-GYNECOLOGICAL CYTOLOGY STUDY: SIGNIFICANT CHANGE UP
SPECIMEN SOURCE: SIGNIFICANT CHANGE UP
SURGICAL PATHOLOGY FINAL REPORT - CH: SIGNIFICANT CHANGE UP

## 2018-08-15 LAB
CULTURE RESULTS: SIGNIFICANT CHANGE UP
SPECIMEN SOURCE: SIGNIFICANT CHANGE UP

## 2018-09-14 PROCEDURE — 82306 VITAMIN D 25 HYDROXY: CPT

## 2018-09-14 PROCEDURE — 87449 NOS EACH ORGANISM AG IA: CPT

## 2018-09-14 PROCEDURE — 87206 SMEAR FLUORESCENT/ACID STAI: CPT

## 2018-09-14 PROCEDURE — 99285 EMERGENCY DEPT VISIT HI MDM: CPT | Mod: 25

## 2018-09-14 PROCEDURE — 71045 X-RAY EXAM CHEST 1 VIEW: CPT

## 2018-09-14 PROCEDURE — 85379 FIBRIN DEGRADATION QUANT: CPT

## 2018-09-14 PROCEDURE — 82164 ANGIOTENSIN I ENZYME TEST: CPT

## 2018-09-14 PROCEDURE — 76000 FLUOROSCOPY <1 HR PHYS/QHP: CPT

## 2018-09-14 PROCEDURE — 83605 ASSAY OF LACTIC ACID: CPT

## 2018-09-14 PROCEDURE — 82652 VIT D 1 25-DIHYDROXY: CPT

## 2018-09-14 PROCEDURE — 86140 C-REACTIVE PROTEIN: CPT

## 2018-09-14 PROCEDURE — 87015 SPECIMEN INFECT AGNT CONCNTJ: CPT

## 2018-09-14 PROCEDURE — 85027 COMPLETE CBC AUTOMATED: CPT

## 2018-09-14 PROCEDURE — 88305 TISSUE EXAM BY PATHOLOGIST: CPT

## 2018-09-14 PROCEDURE — 82607 VITAMIN B-12: CPT

## 2018-09-14 PROCEDURE — 87493 C DIFF AMPLIFIED PROBE: CPT

## 2018-09-14 PROCEDURE — 81025 URINE PREGNANCY TEST: CPT

## 2018-09-14 PROCEDURE — 93005 ELECTROCARDIOGRAM TRACING: CPT

## 2018-09-14 PROCEDURE — 84443 ASSAY THYROID STIM HORMONE: CPT

## 2018-09-14 PROCEDURE — 84481 FREE ASSAY (FT-3): CPT

## 2018-09-14 PROCEDURE — 85652 RBC SED RATE AUTOMATED: CPT

## 2018-09-14 PROCEDURE — 80061 LIPID PANEL: CPT

## 2018-09-14 PROCEDURE — 87899 AGENT NOS ASSAY W/OPTIC: CPT

## 2018-09-14 PROCEDURE — 84484 ASSAY OF TROPONIN QUANT: CPT

## 2018-09-14 PROCEDURE — 82553 CREATINE MB FRACTION: CPT

## 2018-09-14 PROCEDURE — 83036 HEMOGLOBIN GLYCOSYLATED A1C: CPT

## 2018-09-14 PROCEDURE — 88112 CYTOPATH CELL ENHANCE TECH: CPT

## 2018-09-14 PROCEDURE — 84439 ASSAY OF FREE THYROXINE: CPT

## 2018-09-14 PROCEDURE — 87389 HIV-1 AG W/HIV-1&-2 AB AG IA: CPT

## 2018-09-14 PROCEDURE — 84480 ASSAY TRIIODOTHYRONINE (T3): CPT

## 2018-09-14 PROCEDURE — 84100 ASSAY OF PHOSPHORUS: CPT

## 2018-09-14 PROCEDURE — 87633 RESP VIRUS 12-25 TARGETS: CPT

## 2018-09-14 PROCEDURE — 85610 PROTHROMBIN TIME: CPT

## 2018-09-14 PROCEDURE — 87581 M.PNEUMON DNA AMP PROBE: CPT

## 2018-09-14 PROCEDURE — 80053 COMPREHEN METABOLIC PANEL: CPT

## 2018-09-14 PROCEDURE — 94640 AIRWAY INHALATION TREATMENT: CPT

## 2018-09-14 PROCEDURE — 87116 MYCOBACTERIA CULTURE: CPT

## 2018-09-14 PROCEDURE — 83735 ASSAY OF MAGNESIUM: CPT

## 2018-09-14 PROCEDURE — 87486 CHLMYD PNEUM DNA AMP PROBE: CPT

## 2018-09-14 PROCEDURE — 82550 ASSAY OF CK (CPK): CPT

## 2018-09-14 PROCEDURE — 84436 ASSAY OF TOTAL THYROXINE: CPT

## 2018-09-14 PROCEDURE — 85730 THROMBOPLASTIN TIME PARTIAL: CPT

## 2018-09-14 PROCEDURE — 83690 ASSAY OF LIPASE: CPT

## 2018-09-14 PROCEDURE — 86480 TB TEST CELL IMMUN MEASURE: CPT

## 2018-09-14 PROCEDURE — 71275 CT ANGIOGRAPHY CHEST: CPT

## 2018-09-14 PROCEDURE — 87070 CULTURE OTHR SPECIMN AEROBIC: CPT

## 2018-09-14 PROCEDURE — 80048 BASIC METABOLIC PNL TOTAL CA: CPT

## 2018-09-14 PROCEDURE — 87798 DETECT AGENT NOS DNA AMP: CPT

## 2018-09-14 PROCEDURE — 86606 ASPERGILLUS ANTIBODY: CPT

## 2018-09-14 PROCEDURE — 87040 BLOOD CULTURE FOR BACTERIA: CPT

## 2018-09-14 PROCEDURE — 83880 ASSAY OF NATRIURETIC PEPTIDE: CPT

## 2018-09-26 LAB
CULTURE RESULTS: SIGNIFICANT CHANGE UP
SPECIMEN SOURCE: SIGNIFICANT CHANGE UP

## 2018-10-03 LAB
CULTURE RESULTS: SIGNIFICANT CHANGE UP
SPECIMEN SOURCE: SIGNIFICANT CHANGE UP

## 2020-04-15 NOTE — BEHAVIORAL HEALTH ASSESSMENT NOTE - DOMICILED WITH
[Fatigue] : fatigue [Recent Weight Loss (___ Lbs)] : recent weight loss: [unfilled] lbs [SOB on Exertion] : shortness of breath on exertion [Joint Pain] : joint pain [Muscle Weakness] : muscle weakness [Back Pain] : back pain [Headaches] : headaches [Depression] : depression [Insomnia] : insomnia [Anxiety] : anxiety [Negative] : Heme/Lymph [de-identified] : memory problems  seen Neurology Family

## 2021-12-21 NOTE — BEHAVIORAL HEALTH ASSESSMENT NOTE - NS ED BHA MSE SPEECH VOLUME
Received forms from mother for school. Pt will be going to a school retreat and school will need to administer morning and evening dose of divalproex tablets. Mom reports pt receives med at 07:00 and 20:00. Also needs form for [employer].     Forms completed as requested and faxed to school.    Soft

## 2023-07-26 NOTE — BEHAVIORAL HEALTH ASSESSMENT NOTE - ORIENTED TO TIME
Problem: Chronic Conditions and Co-morbidities  Goal: Patient's chronic conditions and co-morbidity symptoms are monitored and maintained or improved  7/26/2023 0954 by Dieter Stevenson LPN  Outcome: Progressing     Problem: Discharge Planning  Goal: Discharge to home or other facility with appropriate resources  7/26/2023 0954 by Dieter Stevenson LPN  Outcome: Progressing     Problem: Pain  Goal: Verbalizes/displays adequate comfort level or baseline comfort level  7/26/2023 0954 by Dieter Stevenson LPN  Outcome: Progressing     Problem: Skin/Tissue Integrity  Goal: Absence of new skin breakdown  Description: 1. Monitor for areas of redness and/or skin breakdown  2. Assess vascular access sites hourly  3. Every 4-6 hours minimum:  Change oxygen saturation probe site  4. Every 4-6 hours:  If on nasal continuous positive airway pressure, respiratory therapy assess nares and determine need for appliance change or resting period.   7/26/2023 0954 by Dieter Stevenson LPN  Outcome: Progressing     Problem: Safety - Adult  Goal: Free from fall injury  7/26/2023 0954 by Dieter Stevenson LPN  Outcome: Progressing Yes